# Patient Record
Sex: FEMALE | Race: WHITE | Employment: OTHER | URBAN - METROPOLITAN AREA
[De-identification: names, ages, dates, MRNs, and addresses within clinical notes are randomized per-mention and may not be internally consistent; named-entity substitution may affect disease eponyms.]

---

## 2021-04-26 ENCOUNTER — HOSPITAL ENCOUNTER (INPATIENT)
Age: 66
LOS: 7 days | Discharge: HOME | DRG: 065 | End: 2021-05-04
Attending: EMERGENCY MEDICINE | Admitting: FAMILY MEDICINE
Payer: MEDICARE

## 2021-04-26 ENCOUNTER — APPOINTMENT (OUTPATIENT)
Dept: CT IMAGING | Age: 66
DRG: 065 | End: 2021-04-26
Payer: MEDICARE

## 2021-04-26 ENCOUNTER — APPOINTMENT (OUTPATIENT)
Dept: GENERAL RADIOLOGY | Age: 66
DRG: 065 | End: 2021-04-26
Payer: MEDICARE

## 2021-04-26 DIAGNOSIS — I63.9 CEREBROVASCULAR ACCIDENT (CVA), UNSPECIFIED MECHANISM (HCC): ICD-10-CM

## 2021-04-26 DIAGNOSIS — G93.40 ENCEPHALOPATHY ACUTE: Primary | ICD-10-CM

## 2021-04-26 PROBLEM — R29.90 STROKE-LIKE SYMPTOMS: Status: ACTIVE | Noted: 2021-04-26

## 2021-04-26 LAB
ACETAMINOPHEN LEVEL: <5 MCG/ML (ref 10–30)
ALBUMIN SERPL-MCNC: 3.8 G/DL (ref 3.5–5.2)
ALP BLD-CCNC: 102 U/L (ref 35–104)
ALT SERPL-CCNC: 6 U/L (ref 0–32)
ANION GAP SERPL CALCULATED.3IONS-SCNC: 12 MMOL/L (ref 7–16)
APTT: 28.3 SEC (ref 24.5–35.1)
AST SERPL-CCNC: 9 U/L (ref 0–31)
BILIRUB SERPL-MCNC: 0.5 MG/DL (ref 0–1.2)
BUN BLDV-MCNC: 20 MG/DL (ref 6–23)
CALCIUM SERPL-MCNC: 9.3 MG/DL (ref 8.6–10.2)
CHLORIDE BLD-SCNC: 94 MMOL/L (ref 98–107)
CO2: 26 MMOL/L (ref 22–29)
CREAT SERPL-MCNC: 1.2 MG/DL (ref 0.5–1)
ETHANOL: <10 MG/DL (ref 0–0.08)
GFR AFRICAN AMERICAN: 55
GFR NON-AFRICAN AMERICAN: 45 ML/MIN/1.73
GLUCOSE BLD-MCNC: 199 MG/DL (ref 74–99)
INR BLD: 1.2
LACTIC ACID, SEPSIS: 1.5 MMOL/L (ref 0.5–1.9)
LIPASE: 28 U/L (ref 13–60)
METER GLUCOSE: 222 MG/DL (ref 74–99)
POTASSIUM REFLEX MAGNESIUM: 4.4 MMOL/L (ref 3.5–5)
PRO-BNP: 131 PG/ML (ref 0–125)
PROTHROMBIN TIME: 12.9 SEC (ref 9.3–12.4)
SALICYLATE, SERUM: <0.3 MG/DL (ref 0–30)
SODIUM BLD-SCNC: 132 MMOL/L (ref 132–146)
TOTAL PROTEIN: 7.6 G/DL (ref 6.4–8.3)
TRICYCLIC ANTIDEPRESSANTS SCREEN SERUM: NEGATIVE NG/ML
TROPONIN: <0.01 NG/ML (ref 0–0.03)

## 2021-04-26 PROCEDURE — 80307 DRUG TEST PRSMV CHEM ANLYZR: CPT

## 2021-04-26 PROCEDURE — 80143 DRUG ASSAY ACETAMINOPHEN: CPT

## 2021-04-26 PROCEDURE — 85730 THROMBOPLASTIN TIME PARTIAL: CPT

## 2021-04-26 PROCEDURE — 83690 ASSAY OF LIPASE: CPT

## 2021-04-26 PROCEDURE — 87635 SARS-COV-2 COVID-19 AMP PRB: CPT

## 2021-04-26 PROCEDURE — 83605 ASSAY OF LACTIC ACID: CPT

## 2021-04-26 PROCEDURE — 70450 CT HEAD/BRAIN W/O DYE: CPT

## 2021-04-26 PROCEDURE — 0042T CT BRAIN PERFUSION: CPT | Performed by: RADIOLOGY

## 2021-04-26 PROCEDURE — 70496 CT ANGIOGRAPHY HEAD: CPT

## 2021-04-26 PROCEDURE — 85025 COMPLETE CBC W/AUTO DIFF WBC: CPT

## 2021-04-26 PROCEDURE — 80179 DRUG ASSAY SALICYLATE: CPT

## 2021-04-26 PROCEDURE — 6360000004 HC RX CONTRAST MEDICATION: Performed by: RADIOLOGY

## 2021-04-26 PROCEDURE — 82962 GLUCOSE BLOOD TEST: CPT

## 2021-04-26 PROCEDURE — 85610 PROTHROMBIN TIME: CPT

## 2021-04-26 PROCEDURE — 80053 COMPREHEN METABOLIC PANEL: CPT

## 2021-04-26 PROCEDURE — 99448 NTRPROF PH1/NTRNET/EHR 21-30: CPT | Performed by: PSYCHIATRY & NEUROLOGY

## 2021-04-26 PROCEDURE — 0042T CT BRAIN PERFUSION: CPT

## 2021-04-26 PROCEDURE — 83880 ASSAY OF NATRIURETIC PEPTIDE: CPT

## 2021-04-26 PROCEDURE — 70496 CT ANGIOGRAPHY HEAD: CPT | Performed by: RADIOLOGY

## 2021-04-26 PROCEDURE — 84484 ASSAY OF TROPONIN QUANT: CPT

## 2021-04-26 PROCEDURE — 70498 CT ANGIOGRAPHY NECK: CPT

## 2021-04-26 PROCEDURE — 99284 EMERGENCY DEPT VISIT MOD MDM: CPT

## 2021-04-26 PROCEDURE — 70498 CT ANGIOGRAPHY NECK: CPT | Performed by: RADIOLOGY

## 2021-04-26 PROCEDURE — 82077 ASSAY SPEC XCP UR&BREATH IA: CPT

## 2021-04-26 PROCEDURE — 71045 X-RAY EXAM CHEST 1 VIEW: CPT

## 2021-04-26 RX ORDER — 0.9 % SODIUM CHLORIDE 0.9 %
1000 INTRAVENOUS SOLUTION INTRAVENOUS ONCE
Status: COMPLETED | OUTPATIENT
Start: 2021-04-27 | End: 2021-04-27

## 2021-04-26 RX ADMIN — IOPAMIDOL 120 ML: 755 INJECTION, SOLUTION INTRAVENOUS at 23:16

## 2021-04-27 PROBLEM — G93.40 ENCEPHALOPATHY ACUTE: Status: ACTIVE | Noted: 2021-04-27

## 2021-04-27 LAB
ABO/RH: NORMAL
AMPHETAMINE SCREEN, URINE: NOT DETECTED
ANISOCYTOSIS: ABNORMAL
ANTIBODY SCREEN: NORMAL
BARBITURATE SCREEN URINE: NOT DETECTED
BASOPHILS ABSOLUTE: 0 E9/L (ref 0–0.2)
BASOPHILS RELATIVE PERCENT: 0.5 % (ref 0–2)
BENZODIAZEPINE SCREEN, URINE: NOT DETECTED
BILIRUBIN URINE: NEGATIVE
BLOOD, URINE: NEGATIVE
CANNABINOID SCREEN URINE: POSITIVE
CHOLESTEROL, TOTAL: 156 MG/DL (ref 0–199)
CLARITY: CLEAR
COCAINE METABOLITE SCREEN URINE: NOT DETECTED
COLOR: YELLOW
EKG ATRIAL RATE: 102 BPM
EKG P AXIS: 56 DEGREES
EKG P-R INTERVAL: 180 MS
EKG Q-T INTERVAL: 364 MS
EKG QRS DURATION: 88 MS
EKG QTC CALCULATION (BAZETT): 474 MS
EKG R AXIS: -22 DEGREES
EKG T AXIS: 26 DEGREES
EKG VENTRICULAR RATE: 102 BPM
EOSINOPHILS ABSOLUTE: 0 E9/L (ref 0.05–0.5)
EOSINOPHILS RELATIVE PERCENT: 1 % (ref 0–6)
FENTANYL SCREEN, URINE: NOT DETECTED
FOLATE: 2.6 NG/ML (ref 4.8–24.2)
GLUCOSE URINE: NEGATIVE MG/DL
HBA1C MFR BLD: 5.7 % (ref 4–5.6)
HCT VFR BLD CALC: 27.7 % (ref 34–48)
HCT VFR BLD CALC: 33 % (ref 34–48)
HDLC SERPL-MCNC: 30 MG/DL
HEMOGLOBIN: 7.9 G/DL (ref 11.5–15.5)
HEMOGLOBIN: 9.7 G/DL (ref 11.5–15.5)
HYPOCHROMIA: ABNORMAL
IRON SATURATION: 10 % (ref 15–50)
IRON: 25 MCG/DL (ref 37–145)
KETONES, URINE: NEGATIVE MG/DL
LACTIC ACID, SEPSIS: 2 MMOL/L (ref 0.5–1.9)
LACTIC ACID: 0.9 MMOL/L (ref 0.5–2.2)
LDL CHOLESTEROL CALCULATED: 101 MG/DL (ref 0–99)
LEUKOCYTE ESTERASE, URINE: NEGATIVE
LYMPHOCYTES ABSOLUTE: 0.49 E9/L (ref 1.5–4)
LYMPHOCYTES RELATIVE PERCENT: 5.2 % (ref 20–42)
Lab: ABNORMAL
MCH RBC QN AUTO: 20.6 PG (ref 26–35)
MCH RBC QN AUTO: 20.8 PG (ref 26–35)
MCHC RBC AUTO-ENTMCNC: 28.5 % (ref 32–34.5)
MCHC RBC AUTO-ENTMCNC: 29.4 % (ref 32–34.5)
MCV RBC AUTO: 70.8 FL (ref 80–99.9)
MCV RBC AUTO: 72.1 FL (ref 80–99.9)
METHADONE SCREEN, URINE: NOT DETECTED
MONOCYTES ABSOLUTE: 0.19 E9/L (ref 0.1–0.95)
MONOCYTES RELATIVE PERCENT: 1.7 % (ref 2–12)
NEUTROPHILS ABSOLUTE: 9.02 E9/L (ref 1.8–7.3)
NEUTROPHILS RELATIVE PERCENT: 93 % (ref 43–80)
NITRITE, URINE: NEGATIVE
NUCLEATED RED BLOOD CELLS: 0.9 /100 WBC
OPIATE SCREEN URINE: NOT DETECTED
OVALOCYTES: ABNORMAL
OXYCODONE URINE: NOT DETECTED
PDW BLD-RTO: 20.2 FL (ref 11.5–15)
PDW BLD-RTO: 20.2 FL (ref 11.5–15)
PH UA: 6 (ref 5–9)
PHENCYCLIDINE SCREEN URINE: NOT DETECTED
PLATELET # BLD: 203 E9/L (ref 130–450)
PLATELET # BLD: 301 E9/L (ref 130–450)
PMV BLD AUTO: 8.5 FL (ref 7–12)
PMV BLD AUTO: 8.7 FL (ref 7–12)
POIKILOCYTES: ABNORMAL
POLYCHROMASIA: ABNORMAL
PROTEIN UA: NEGATIVE MG/DL
RBC # BLD: 3.84 E12/L (ref 3.5–5.5)
RBC # BLD: 4.66 E12/L (ref 3.5–5.5)
SARS-COV-2, NAAT: NOT DETECTED
SPECIFIC GRAVITY UA: <=1.005 (ref 1–1.03)
TOTAL IRON BINDING CAPACITY: 243 MCG/DL (ref 250–450)
TRIGL SERPL-MCNC: 125 MG/DL (ref 0–149)
TROPONIN: <0.01 NG/ML (ref 0–0.03)
TROPONIN: <0.01 NG/ML (ref 0–0.03)
TSH SERPL DL<=0.05 MIU/L-ACNC: 0.35 UIU/ML (ref 0.27–4.2)
UROBILINOGEN, URINE: 4 E.U./DL
VITAMIN B-12: 362 PG/ML (ref 211–946)
VLDLC SERPL CALC-MCNC: 25 MG/DL
WBC # BLD: 7.2 E9/L (ref 4.5–11.5)
WBC # BLD: 9.7 E9/L (ref 4.5–11.5)

## 2021-04-27 PROCEDURE — 81003 URINALYSIS AUTO W/O SCOPE: CPT

## 2021-04-27 PROCEDURE — 86850 RBC ANTIBODY SCREEN: CPT

## 2021-04-27 PROCEDURE — 82607 VITAMIN B-12: CPT

## 2021-04-27 PROCEDURE — 2580000003 HC RX 258: Performed by: FAMILY MEDICINE

## 2021-04-27 PROCEDURE — 99221 1ST HOSP IP/OBS SF/LOW 40: CPT | Performed by: PSYCHIATRY & NEUROLOGY

## 2021-04-27 PROCEDURE — 36415 COLL VENOUS BLD VENIPUNCTURE: CPT

## 2021-04-27 PROCEDURE — 84443 ASSAY THYROID STIM HORMONE: CPT

## 2021-04-27 PROCEDURE — 83550 IRON BINDING TEST: CPT

## 2021-04-27 PROCEDURE — 83540 ASSAY OF IRON: CPT

## 2021-04-27 PROCEDURE — 6370000000 HC RX 637 (ALT 250 FOR IP): Performed by: STUDENT IN AN ORGANIZED HEALTH CARE EDUCATION/TRAINING PROGRAM

## 2021-04-27 PROCEDURE — 93005 ELECTROCARDIOGRAM TRACING: CPT | Performed by: STUDENT IN AN ORGANIZED HEALTH CARE EDUCATION/TRAINING PROGRAM

## 2021-04-27 PROCEDURE — 2580000003 HC RX 258: Performed by: STUDENT IN AN ORGANIZED HEALTH CARE EDUCATION/TRAINING PROGRAM

## 2021-04-27 PROCEDURE — 86901 BLOOD TYPING SEROLOGIC RH(D): CPT

## 2021-04-27 PROCEDURE — 87040 BLOOD CULTURE FOR BACTERIA: CPT

## 2021-04-27 PROCEDURE — 83605 ASSAY OF LACTIC ACID: CPT

## 2021-04-27 PROCEDURE — 83036 HEMOGLOBIN GLYCOSYLATED A1C: CPT

## 2021-04-27 PROCEDURE — 2580000003 HC RX 258: Performed by: EMERGENCY MEDICINE

## 2021-04-27 PROCEDURE — 2060000000 HC ICU INTERMEDIATE R&B

## 2021-04-27 PROCEDURE — 84484 ASSAY OF TROPONIN QUANT: CPT

## 2021-04-27 PROCEDURE — 82746 ASSAY OF FOLIC ACID SERUM: CPT

## 2021-04-27 PROCEDURE — 86900 BLOOD TYPING SEROLOGIC ABO: CPT

## 2021-04-27 PROCEDURE — 80061 LIPID PANEL: CPT

## 2021-04-27 PROCEDURE — 80307 DRUG TEST PRSMV CHEM ANLYZR: CPT

## 2021-04-27 PROCEDURE — 85027 COMPLETE CBC AUTOMATED: CPT

## 2021-04-27 PROCEDURE — 2700000000 HC OXYGEN THERAPY PER DAY

## 2021-04-27 RX ORDER — SODIUM CHLORIDE 9 MG/ML
INJECTION, SOLUTION INTRAVENOUS CONTINUOUS
Status: DISCONTINUED | OUTPATIENT
Start: 2021-04-27 | End: 2021-04-29

## 2021-04-27 RX ORDER — FENTANYL CITRATE 50 UG/ML
INJECTION, SOLUTION INTRAMUSCULAR; INTRAVENOUS
Status: DISPENSED
Start: 2021-04-27 | End: 2021-04-28

## 2021-04-27 RX ORDER — SODIUM CHLORIDE 9 MG/ML
1000 INJECTION, SOLUTION INTRAVENOUS CONTINUOUS
Status: DISCONTINUED | OUTPATIENT
Start: 2021-04-27 | End: 2021-04-27 | Stop reason: SDUPTHER

## 2021-04-27 RX ORDER — ASPIRIN 81 MG/1
81 TABLET ORAL DAILY
Status: DISCONTINUED | OUTPATIENT
Start: 2021-04-27 | End: 2021-05-01

## 2021-04-27 RX ORDER — 0.9 % SODIUM CHLORIDE 0.9 %
1000 INTRAVENOUS SOLUTION INTRAVENOUS ONCE
Status: COMPLETED | OUTPATIENT
Start: 2021-04-27 | End: 2021-04-27

## 2021-04-27 RX ORDER — ASPIRIN 300 MG/1
300 SUPPOSITORY RECTAL DAILY
Status: DISCONTINUED | OUTPATIENT
Start: 2021-04-27 | End: 2021-05-01

## 2021-04-27 RX ORDER — ATORVASTATIN CALCIUM 40 MG/1
40 TABLET, FILM COATED ORAL NIGHTLY
Status: DISCONTINUED | OUTPATIENT
Start: 2021-04-27 | End: 2021-05-05 | Stop reason: HOSPADM

## 2021-04-27 RX ORDER — PROMETHAZINE HYDROCHLORIDE 25 MG/1
12.5 TABLET ORAL EVERY 6 HOURS PRN
Status: DISCONTINUED | OUTPATIENT
Start: 2021-04-27 | End: 2021-05-05 | Stop reason: HOSPADM

## 2021-04-27 RX ORDER — POLYETHYLENE GLYCOL 3350 17 G/17G
17 POWDER, FOR SOLUTION ORAL DAILY PRN
Status: DISCONTINUED | OUTPATIENT
Start: 2021-04-27 | End: 2021-05-05 | Stop reason: HOSPADM

## 2021-04-27 RX ORDER — ASPIRIN 81 MG/1
81 TABLET, CHEWABLE ORAL DAILY
Status: ON HOLD | COMMUNITY
End: 2021-05-03 | Stop reason: HOSPADM

## 2021-04-27 RX ORDER — SODIUM CHLORIDE 9 MG/ML
25 INJECTION, SOLUTION INTRAVENOUS PRN
Status: DISCONTINUED | OUTPATIENT
Start: 2021-04-27 | End: 2021-05-05 | Stop reason: HOSPADM

## 2021-04-27 RX ORDER — SODIUM CHLORIDE 0.9 % (FLUSH) 0.9 %
5-40 SYRINGE (ML) INJECTION PRN
Status: DISCONTINUED | OUTPATIENT
Start: 2021-04-27 | End: 2021-05-05 | Stop reason: HOSPADM

## 2021-04-27 RX ORDER — ACETAMINOPHEN 650 MG/1
SUPPOSITORY RECTAL
Status: DISCONTINUED
Start: 2021-04-27 | End: 2021-04-27 | Stop reason: WASHOUT

## 2021-04-27 RX ORDER — ONDANSETRON 2 MG/ML
4 INJECTION INTRAMUSCULAR; INTRAVENOUS EVERY 6 HOURS PRN
Status: DISCONTINUED | OUTPATIENT
Start: 2021-04-27 | End: 2021-05-05 | Stop reason: HOSPADM

## 2021-04-27 RX ORDER — ASPIRIN 300 MG/1
300 SUPPOSITORY RECTAL ONCE
Status: COMPLETED | OUTPATIENT
Start: 2021-04-27 | End: 2021-04-27

## 2021-04-27 RX ORDER — SODIUM CHLORIDE 0.9 % (FLUSH) 0.9 %
5-40 SYRINGE (ML) INJECTION EVERY 12 HOURS SCHEDULED
Status: DISCONTINUED | OUTPATIENT
Start: 2021-04-27 | End: 2021-05-05 | Stop reason: HOSPADM

## 2021-04-27 RX ADMIN — SODIUM CHLORIDE 1000 ML: 9 INJECTION, SOLUTION INTRAVENOUS at 04:14

## 2021-04-27 RX ADMIN — Medication 10 ML: at 10:50

## 2021-04-27 RX ADMIN — SODIUM CHLORIDE: 9 INJECTION, SOLUTION INTRAVENOUS at 06:05

## 2021-04-27 RX ADMIN — ASPIRIN 300 MG: 300 SUPPOSITORY RECTAL at 03:51

## 2021-04-27 RX ADMIN — SODIUM CHLORIDE 1000 ML: 9 INJECTION, SOLUTION INTRAVENOUS at 00:30

## 2021-04-27 NOTE — ED NOTES
Time Neurologist page: [phone] telestroke      Time Stroke Alert called: 0579  :    Time Neurologist called back:     X-Ray/CT notified: Yes     Sofia Litten  04/26/21 5917

## 2021-04-27 NOTE — PROGRESS NOTES
OT SESSION ATTEMPT     Date:2021  Patient Name: Yesy Moffett  MRN: [id number]  : 1955  Room: [address] [place] [address]     Attempted OT session this date:    [x] unavailable due to other medical staff currently with pt   [] on hold, await MRI/ neurosurgical recommendations. [] on hold per nursing staff secondary to lab / radiology results    [] declined Occupational Therapy  this date due to ___. Benefits of participation in therapy reviewed with pt.    [] off unit   [] Other:     Will reattempt OT eval at a later time.     Kelsy [place], [place] [address]

## 2021-04-27 NOTE — ED PROVIDER NOTES
[address]      Pt Name: Tanisha Rm  MRN: [id number]  [place] 1955  Date of evaluation: 4/26/2021      CHIEF COMPLAINT       Chief Complaint   Patient presents with    Cerebrovascular Accident     LKW 10:00pm. per family patient went unresponsive while sitting on the couch. hx of stroke in 2017.  during triage . Patient moves all extremities to painful stimuli        HPI  Tanisha Rm is a 73 y.o. female with history of previous CVA who presents to the emergency department with altered mental status. Apparently at 10 PM she is at the family sitting on couch and recently became unresponsive. They called EMS who brought her here. Blood sugar 222 per EMS. Not hypoxic with stable vitals per EMS. History limited secondary to patient nonverbal        Review of Systems   Unable to perform ROS: Mental status change        Physical Exam  Vitals signs and nursing note reviewed. Constitutional:       Appearance: She is well-developed. Comments: Awake. Opens eyes spontaneously. Moves all extremities to pain. HENT:      Head: Normocephalic and atraumatic. Right Ear: External ear normal.      Left Ear: External ear normal.      Mouth/Throat:      Mouth: Mucous membranes are moist.   Eyes:      General: No scleral icterus. Pupils: Pupils are equal, round, and reactive to light. Comments: EOMI   Neck:      Musculoskeletal: Normal range of motion and neck supple. Cardiovascular:      Rate and Rhythm: Normal rate and regular rhythm. Heart sounds: No murmur. Comments: 2+ radial and dorsal pedis pulses bilaterally  Pulmonary:      Effort: Pulmonary effort is normal. No respiratory distress. Breath sounds: Normal breath sounds. No wheezing. Abdominal:      General: There is no distension. Palpations: Abdomen is soft. Tenderness: There is no guarding or rebound. Musculoskeletal:         General: No deformity. Right lower leg: No edema. Left lower leg: No edema. Skin:     General: Skin is warm and dry. Capillary Refill: Capillary refill takes less than 2 seconds. Neurological:      Mental Status: She is alert. Motor: No abnormal muscle tone. Comments: Patient nonverbal.  Opens eyes spontaneously. Moves all extremities secondary to pain. EOMI. Pupils equal round reactive. Not following commands. Psychiatric:      Comments: Nonverbal.  Not following commands. Exam limited. Procedures     MDM   This is a 43-year-old female with a history of previous CVA presents with altered mental status acutely at 10 PM.  In the emergency department patient awake, opens eyes spontaneously. Moves all extremities secondary to pain. Maintaining airway nonhypoxic. Stroke alert called given sudden onset of symptoms. Discussed with Dr. Starr Max, neurology as well as interventional neurology, Dr. Demetrio Estrada. CTA head and neck, brain perfusion study showed no acute intracranial abnormality. Did show previous old infarct. Patient's family provides additional history after arrival that she likely had a marijuana brownie earlier today. Metabolic panel shows normal electrolytes, creatinine 1.2. Administered IV fluids in the ED as well as aspirin. EKG showed no ischemic changes troponin was negative less likely acute cardiac etiology of her symptoms. Alcohol level negative. Patient remained hemodynamic stable, no respiratory distress on 2 L nasal cannula. Continue to be nonverbal but moving all extremities and securing airway. Discussed the case with Dr. Darell Moore, no suspicion for further evaluation. ED Course as of Apr 27 0107   Tue Apr 27, 2021   0055 Patient remains awake, occasional moving all extremities. Maintaining airway. Occasionally licks lips and looks around room. Continues to not answer questions.     [LM]      ED Course User Index  [LM] Rebecca Baker DO         ED Course as of Apr 27 0107   Tue Apr 27, 2021   0055 Patient remains awake, occasional moving all extremities. Maintaining airway. Occasionally licks lips and looks around room. Continues to not answer questions. [LM]      ED Course User Index  [LM] Rebecca Baker DO       --------------------------------------------- PAST HISTORY ---------------------------------------------  Past Medical History:  has a past medical history of Acute gastroenteritis, Chronic back pain, Headache(784.0), Hyperlipidemia, Obesity, Osteoarthritis, and Tobacco abuse. Past Surgical History:  has a past surgical history that includes Tubal ligation (1604). Social History:  reports that she has been smoking cigarettes. She has a 30.40 pack-year smoking history. She has never used smokeless tobacco. She reports that she does not drink alcohol or use drugs. Family History: family history includes Cancer in her brother and sister; Diabetes in her brother, sister, and sister; Heart Disease in her brother; Heart Disease (age of onset: 28) in her sister; High Blood Pressure in her brother and sister; High Cholesterol in her brother. The patients home medications have been reviewed. Allergies: Patient has no known allergies.     -------------------------------------------------- RESULTS -------------------------------------------------    LABS:  Results for orders placed or performed during the hospital encounter of 04/26/21   COVID-19, Rapid    Specimen: Nasopharyngeal Swab   Result Value Ref Range    SARS-CoV-2, NAAT Not Detected Not Detected   CBC Auto Differential   Result Value Ref Range    WBC 9.7 4.5 - 11.5 E9/L    RBC 4.66 3.50 - 5.50 E12/L    Hemoglobin 9.7 (L) 11.5 - 15.5 g/dL    Hematocrit 33.0 (L) 34.0 - 48.0 %    MCV 70.8 (L) 80.0 - 99.9 fL    MCH 20.8 (L) 26.0 - 35.0 pg    MCHC 29.4 (L) 32.0 - 34.5 %    RDW 20.2 (H) 11.5 - 15.0 fL    Platelets 095 457 - 268 E9/L    MPV 8.5 7.0 - 12.0 fL    Neutrophils % 93.0 (H) 43.0 - 80.0 %    Lymphocytes % 5.2 (L) 20.0 - 42.0 %    Monocytes % 1.7 (L) 2.0 - 12.0 %    Eosinophils % 1.0 0.0 - 6.0 %    Basophils % 0.5 0.0 - 2.0 %    Neutrophils Absolute 9.02 (H) 1.80 - 7.30 E9/L    Lymphocytes Absolute 0.49 (L) 1.50 - 4.00 E9/L    Monocytes Absolute 0.19 0.10 - 0.95 E9/L    Eosinophils Absolute 0.00 (L) 0.05 - 0.50 E9/L    Basophils Absolute 0.00 0.00 - 0.20 E9/L    nRBC 0.9 /100 WBC    Anisocytosis 2+     Polychromasia 1+     Hypochromia 2+     Poikilocytes 1+     Ovalocytes 1+    Comprehensive Metabolic Panel w/ Reflex to MG   Result Value Ref Range    Sodium 132 132 - 146 mmol/L    Potassium reflex Magnesium 4.4 3.5 - 5.0 mmol/L    Chloride 94 (L) 98 - 107 mmol/L    CO2 26 22 - 29 mmol/L    Anion Gap 12 7 - 16 mmol/L    Glucose 199 (H) 74 - 99 mg/dL    BUN 20 6 - 23 mg/dL    CREATININE 1.2 (H) 0.5 - 1.0 mg/dL    GFR Non-African American 45 >=60 mL/min/1.73    GFR African American 55     Calcium 9.3 8.6 - 10.2 mg/dL    Total Protein 7.6 6.4 - 8.3 g/dL    Albumin 3.8 3.5 - 5.2 g/dL    Total Bilirubin 0.5 0.0 - 1.2 mg/dL    Alkaline Phosphatase 102 35 - 104 U/L    ALT 6 0 - 32 U/L    AST 9 0 - 31 U/L   Lipase   Result Value Ref Range    Lipase 28 13 - 60 U/L   Troponin   Result Value Ref Range    Troponin <0.01 0.00 - 0.03 ng/mL   Brain Natriuretic Peptide   Result Value Ref Range    Pro- (H) 0 - 125 pg/mL   Protime-INR   Result Value Ref Range    Protime 12.9 (H) 9.3 - 12.4 sec    INR 1.2    APTT   Result Value Ref Range    aPTT 28.3 24.5 - 35.1 sec   Lactate, Sepsis   Result Value Ref Range    Lactic Acid, Sepsis 1.5 0.5 - 1.9 mmol/L   Lactate, Sepsis   Result Value Ref Range    Lactic Acid, Sepsis 2.0 (H) 0.5 - 1.9 mmol/L   Serum Drug Screen   Result Value Ref Range    Ethanol Lvl <10 mg/dL    Acetaminophen Level <5.0 (L) 10.0 - 51.4 mcg/mL    Salicylate, Serum <3.4 0.0 - 30.0 mg/dL    TCA Scrn NEGATIVE Cutoff:300 ng/mL   POCT Glucose Time Roomed:  4/26/2021 10:50 PM  ED Bed Assignment:  12/12    The nursing notes within the ED encounter and vital signs as below have been reviewed. Patient Vitals for the past 24 hrs:   BP Temp Pulse Resp SpO2   04/27/21 0105 (!) 93/42 -- 97 25 97 %   04/27/21 0104 (!) 93/42 96.9 °F (36.1 °C) -- 26 97 %   04/27/21 0101 (!) 89/42 -- 98 28 --   04/27/21 0047 (!) 76/35 -- 107 (!) 31 --   04/27/21 0031 (!) 98/46 -- 107 (!) 31 --   04/27/21 0002 (!) 94/44 -- 102 25 100 %   04/26/21 2331 (!) 122/58 -- 105 21 94 %       Oxygen Saturation Interpretation: Normal    ------------------------------------------ PROGRESS NOTES ------------------------------------------    Counseling:  I have spoken with the son and discussed todays results, in addition to providing specific details for the plan of care and counseling regarding the diagnosis and prognosis. Their questions are answered at this time and they are agreeable with the plan of admission.    --------------------------------- ADDITIONAL PROVIDER NOTES ---------------------------------  Consultations:   Spoke with Dr. Candace Polk. Discussed case. They will admit the patient. This patient's ED course included: a personal history and physicial examination, re-evaluation prior to disposition, multiple bedside re-evaluations, IV medications, cardiac monitoring and continuous pulse oximetry    This patient has remained hemodynamically stable during their ED course. Diagnosis:  1. Encephalopathy acute    2. Cerebrovascular accident (CVA), unspecified mechanism ([address]        Disposition:  Patient's disposition: Admit to telemetry  Patient's condition is fair.          Mindy Cifuentes DO  Resident  04/27/21 6195

## 2021-04-27 NOTE — ED NOTES
Unable to send patient for MRI at this time, is unable to give me answers to MRI screening form, tried to call son, no answer. No reasons based on chart she should be unable to go. MRI updated.       Argentina Gomez RN  04/27/21 5644

## 2021-04-27 NOTE — H&P
Hospital Medicine History & Physical      PCP: Demetrio Tsang MD (Inactive)    Date of Admission: 4/26/2021    Date of Service: Pt seen/examined on 4/27/2021  and Admitted to Inpatient with expected LOS greater than two midnights due to medical therapy. Chief Complaint:  AMS       History Of Present Illness:    73 y.o. female with past medical history of CVA  HLD   . She present to the ED due to  altered mental status . Patient was with family at 10 pm sitting on the couch and suddenly became unresponsive . She started to have slurred speech . In the ED blood sugar was 222 . Patient was not hypoxic in ED Patient responds  to pain but is able to open her eyes but does not speak . History is limited because patient is nonverbal. ED course revealed trop <0.01  creatinine 1.2 lactic acid 1.5 CT perfusion revealed no acute infarct . CTA Head /neck revealed no signficant stenosis     Past Medical History:          Diagnosis Date    Acute gastroenteritis 03/2010    Chronic back pain     Headache(784.0)     Hyperlipidemia 03/2010    Obesity     Osteoarthritis     Tobacco abuse        Past Surgical History:          Procedure Laterality Date    TUBAL LIGATION  1983       Medications Prior to Admission:      Prior to Admission medications    Not on File       Allergies:  Patient has no known allergies. Social History:      The patient currently lives with family     TOBACCO:   reports that she has been smoking cigarettes. She has a 30.40 pack-year smoking history. She has never used smokeless tobacco.  ETOH:   reports no history of alcohol use. Family History:       Reviewed in detail and negative for DM, CAD, Cancer, CVA.  Positive as follows:        Problem Relation Age of Onset    Diabetes Sister     High Blood Pressure Sister     Cancer Sister         Thryoid    Diabetes Sister     Heart Disease Sister 28        Heart Failure    Cancer Brother     Diabetes Brother     Heart Viz[employer]ai algorithm       CT PERFUSION   There is evidence for core infarct in the right frontal lobe likely   old, no acute infarct or ischemia identified       This study was analyzed by the Viz. ai algorithm.             CT HEAD    No evidence for acute intracranial hemorrhage, territorial infarction or   intracranial mass lesion. Delmy Tadeo considering the extensive amount of white   matter changes and extensive prior areas of infarction, brain MRI is more   sensitive for detection of subtle foci of acute infarction.       2.  Chronic encephalomalacia involving right medial occipital lobe and   anterior aspect of the right superior frontal gyrus and chronic   encephalomalacia of bilateral corona radiata and bilateral thalamus and   bilateral basal ganglia.       3.  Moderate chronic microangiopathic ischemic disease. Mild generalized   volume loss. Urinalysis:    No results found for: Darliss Mcburney, BACTERIA, RBCUA, BLOODU, SPECGRAV, GLUCOSEU      ASSESSMENT:    Active Hospital Problems    Diagnosis Date Noted    Encephalopathy acute [G93.40] 04/27/2021    Stroke-like symptoms [R29.90] 04/26/2021       PLAN:    Acute Encephalopathy :   CVA vs metabolic encephalopathy Patient become unresponsive with family and was aphasic. Per family patient ate a THC  brownie . CT head reveals white matter changes and prior areas of infarction and MRI   recommended . CTA Head /Neck revealed no significant stenosis   COVID neg.  Admit inpatient vital telemetry NPO swallow evaluation 2D echo  ASA Lipitor  MRI head Neurology consult PT/OT blood cultures, UA UDS     Acute kidney injury : Scr is 1.2 baseline is 0.9 hydrate with intravenous fluids  Likely prerenal    Lactic Acidosis :  1.5 on admission >>>2.0 hydrate with IVF and repeat     Hyperglycemia : glucose was 199 A1c pending     Microcytic Anemia :  HGB is 9.7 no baseline in EMR to compare  iron studies sand occult                 DVT Prophylaxis: Lovenox   Diet: NPO Code Status: full code      Maggy Skinner MD

## 2021-04-27 NOTE — CONSULTS
[facility]  Neurology Consult    Date:  4/27/2021  Patient Name:  Alf Arnett  YOB: 1955  MRN: [id number]     PCP:  Negro Quiroz MD (Inactive)   Referring:  No ref. provider found      Chief Complaint: episode of altered mental status    History obtained from: chart    Assessment  Alf Arnett is a 73 y.o. female with altered mental status, though, at present she appears to awaken very easily with noxious stimulation. Her exam is non-lateralizing other than a lack of speech production. Unfortunately, no family was reachable at present to give further history. Concern would be for stroke vs post-ictal state at this time. Plan  · MRI brain w/o contrast  · EEG        History of Present Illness:  Alf Arnett is a 73 y.o. female presenting for evaluation of altered mental status. As per the chart: \"Patient was with family at 10 pm sitting on the couch and suddenly became unresponsive . She started to have slurred speech. \" The patient when seen in the ED did not provide any history or follow any commands.        Review of Systems:  Unable to obtain    Medical History:   Past Medical History:   Diagnosis Date    Acute gastroenteritis 03/2010    Chronic back pain     Headache(784.0)     Hyperlipidemia 03/2010    Obesity     Osteoarthritis     Tobacco abuse         Surgical History:   Past Surgical History:   Procedure Laterality Date    TUBAL LIGATION  1983        Family History:   Family History   Problem Relation Age of Onset    Diabetes Sister     High Blood Pressure Sister     Cancer Sister         Thryoid    Diabetes Sister     Heart Disease Sister 28        Heart Failure    Cancer Brother     Diabetes Brother     Heart Disease Brother     High Blood Pressure Brother     High Cholesterol Brother          Social History:  Social History     Tobacco Use    Smoking status: Current Every Day Smoker     Packs/day: 0.80     Years: 38.00     Pack years: no respiratory distress noted  Extremities: No edema or cyanosis noted    Mental Status  Will open eyes to noxious stimulation and will attend to examiner  Does not follow commands  Will purposefully withdraw or push away noxious stimulation    Cranial Nerves  + bilateral response to visual threat (bilateral peripheral fields)  Pupils 4 mm, do react to light. Spontaneous eye movements: Yes  Oculocephalic reflex present bilaterally  Face appears symmetric    Motor  + withdrawal in response to nail bed pressure in bilateral upper and lower extremities    Sensation +grimace/withdrawal in response to deep nail bed pressure in bilateral upper or lower extremities    Reflexes:       Right Left   Biceps 2 2   Brachioradialis 2 2   Quadriceps 2 2   Achilles 2 2   Ankle clonus 4 beats 4 beats   Babinski absent absent         Labs  Results for Ortega Bowen (MRN [id number]) as of 4/27/2021 17:41   Ref.  Range 4/27/2021 07:37   Lactic Acid Latest Ref Range: 0.5 - 2.2 mmol/L 0.9   Cholesterol, Total Latest Ref Range: 0 - 199 mg/dL 156   HDL Cholesterol Latest Ref Range: >40 mg/dL 30   LDL Calculated Latest Ref Range: 0 - 99 mg/dL 101 (H)   Triglycerides Latest Ref Range: 0 - 149 mg/dL 125   VLDL Cholesterol Calculated Latest Units: mg/dL 25   Hemoglobin A1C Latest Ref Range: 4.0 - 5.6 % 5.7 (H)   TSH Latest Ref Range: 0.270 - 4.200 uIU/mL 0.354   WBC Latest Ref Range: 4.5 - 11.5 E9/L 7.2   RBC Latest Ref Range: 3.50 - 5.50 E12/L 3.84   Hemoglobin Quant Latest Ref Range: 11.5 - 15.5 g/dL 7.9 (L)   Hematocrit Latest Ref Range: 34.0 - 48.0 % 27.7 (L)   MCV Latest Ref Range: 80.0 - 99.9 fL 72.1 (L)   MCH Latest Ref Range: 26.0 - 35.0 pg 20.6 (L)   MCHC Latest Ref Range: 32.0 - 34.5 % 28.5 (L)   MPV Latest Ref Range: 7.0 - 12.0 fL 8.7   RDW Latest Ref Range: 11.5 - 15.0 fL 20.2 (H)   Platelet Count Latest Ref Range: 130 - 450 E9/L 203   Iron Latest Ref Range: 37 - 145 mcg/dL 25 (L)   Iron Saturation Latest Ref Range: 15 - 50 % 10 (L)   TIBC Latest Ref Range: 250 - 450 mcg/dL 243 (L)   Folate Latest Ref Range: 4.8 - 24.2 ng/mL 2.6 (L)   Vitamin B-12 Latest Ref Range: 211 - 946 pg/mL 362   CULTURE, BLOOD 1 Unknown Rpt       Imaging  CTA head/neck and CT brain perfusion  Impression       There is evidence for core infarct in the right frontal lobe likely   old, no acute infarct or ischemia identified       This study was analyzed by the Viz[employer] ai algorithm. Impression   1.  Mild atherosclerotic disease . No large vessel occlusion of the   2.  Estimated stenosis of the proximal right and left internal carotid   artery by NASCET criteria is not hemodynamically significant         Electronically signed by: Quique Corbett DO, 4/27/2021 2:17 PM

## 2021-04-28 ENCOUNTER — APPOINTMENT (OUTPATIENT)
Dept: NEUROLOGY | Age: 66
DRG: 065 | End: 2021-04-28
Payer: MEDICARE

## 2021-04-28 ENCOUNTER — APPOINTMENT (OUTPATIENT)
Dept: MRI IMAGING | Age: 66
DRG: 065 | End: 2021-04-28
Payer: MEDICARE

## 2021-04-28 LAB
ALBUMIN SERPL-MCNC: 3.2 G/DL (ref 3.5–5.2)
ALP BLD-CCNC: 87 U/L (ref 35–104)
ALT SERPL-CCNC: 5 U/L (ref 0–32)
ANION GAP SERPL CALCULATED.3IONS-SCNC: 10 MMOL/L (ref 7–16)
ANION GAP SERPL CALCULATED.3IONS-SCNC: 10 MMOL/L (ref 7–16)
AST SERPL-CCNC: 8 U/L (ref 0–31)
BASOPHILS ABSOLUTE: 0.04 E9/L (ref 0–0.2)
BASOPHILS RELATIVE PERCENT: 0.6 % (ref 0–2)
BILIRUB SERPL-MCNC: 0.3 MG/DL (ref 0–1.2)
BUN BLDV-MCNC: 10 MG/DL (ref 6–23)
BUN BLDV-MCNC: 9 MG/DL (ref 6–23)
CALCIUM SERPL-MCNC: 8.5 MG/DL (ref 8.6–10.2)
CALCIUM SERPL-MCNC: 8.6 MG/DL (ref 8.6–10.2)
CHLORIDE BLD-SCNC: 101 MMOL/L (ref 98–107)
CHLORIDE BLD-SCNC: 102 MMOL/L (ref 98–107)
CO2: 23 MMOL/L (ref 22–29)
CO2: 23 MMOL/L (ref 22–29)
CREAT SERPL-MCNC: 0.7 MG/DL (ref 0.5–1)
CREAT SERPL-MCNC: 0.8 MG/DL (ref 0.5–1)
EOSINOPHILS ABSOLUTE: 0.16 E9/L (ref 0.05–0.5)
EOSINOPHILS RELATIVE PERCENT: 2.5 % (ref 0–6)
GFR AFRICAN AMERICAN: >60
GFR AFRICAN AMERICAN: >60
GFR NON-AFRICAN AMERICAN: >60 ML/MIN/1.73
GFR NON-AFRICAN AMERICAN: >60 ML/MIN/1.73
GLUCOSE BLD-MCNC: 152 MG/DL (ref 74–99)
GLUCOSE BLD-MCNC: 93 MG/DL (ref 74–99)
HCT VFR BLD CALC: 28.6 % (ref 34–48)
HEMOGLOBIN: 8.3 G/DL (ref 11.5–15.5)
IMMATURE GRANULOCYTES #: 0.04 E9/L
IMMATURE GRANULOCYTES %: 0.6 % (ref 0–5)
LYMPHOCYTES ABSOLUTE: 0.71 E9/L (ref 1.5–4)
LYMPHOCYTES RELATIVE PERCENT: 11.3 % (ref 20–42)
MCH RBC QN AUTO: 20.5 PG (ref 26–35)
MCHC RBC AUTO-ENTMCNC: 29 % (ref 32–34.5)
MCV RBC AUTO: 70.6 FL (ref 80–99.9)
MONOCYTES ABSOLUTE: 0.39 E9/L (ref 0.1–0.95)
MONOCYTES RELATIVE PERCENT: 6.2 % (ref 2–12)
NEUTROPHILS ABSOLUTE: 4.94 E9/L (ref 1.8–7.3)
NEUTROPHILS RELATIVE PERCENT: 78.8 % (ref 43–80)
PDW BLD-RTO: 19.9 FL (ref 11.5–15)
PLATELET # BLD: 245 E9/L (ref 130–450)
PMV BLD AUTO: 8.8 FL (ref 7–12)
POTASSIUM REFLEX MAGNESIUM: 3.6 MMOL/L (ref 3.5–5)
POTASSIUM REFLEX MAGNESIUM: 3.7 MMOL/L (ref 3.5–5)
RBC # BLD: 4.05 E12/L (ref 3.5–5.5)
SODIUM BLD-SCNC: 134 MMOL/L (ref 132–146)
SODIUM BLD-SCNC: 135 MMOL/L (ref 132–146)
TOTAL PROTEIN: 6.6 G/DL (ref 6.4–8.3)
TROPONIN: <0.01 NG/ML (ref 0–0.03)
WBC # BLD: 6.3 E9/L (ref 4.5–11.5)

## 2021-04-28 PROCEDURE — 70551 MRI BRAIN STEM W/O DYE: CPT

## 2021-04-28 PROCEDURE — 97162 PT EVAL MOD COMPLEX 30 MIN: CPT

## 2021-04-28 PROCEDURE — 97530 THERAPEUTIC ACTIVITIES: CPT

## 2021-04-28 PROCEDURE — 80048 BASIC METABOLIC PNL TOTAL CA: CPT

## 2021-04-28 PROCEDURE — 97129 THER IVNTJ 1ST 15 MIN: CPT

## 2021-04-28 PROCEDURE — 95816 EEG AWAKE AND DROWSY: CPT

## 2021-04-28 PROCEDURE — 2580000003 HC RX 258: Performed by: FAMILY MEDICINE

## 2021-04-28 PROCEDURE — 6360000002 HC RX W HCPCS: Performed by: FAMILY MEDICINE

## 2021-04-28 PROCEDURE — 97166 OT EVAL MOD COMPLEX 45 MIN: CPT

## 2021-04-28 PROCEDURE — 36415 COLL VENOUS BLD VENIPUNCTURE: CPT

## 2021-04-28 PROCEDURE — 6370000000 HC RX 637 (ALT 250 FOR IP): Performed by: CLINICAL NURSE SPECIALIST

## 2021-04-28 PROCEDURE — 95816 EEG AWAKE AND DROWSY: CPT | Performed by: PSYCHIATRY & NEUROLOGY

## 2021-04-28 PROCEDURE — 99233 SBSQ HOSP IP/OBS HIGH 50: CPT | Performed by: CLINICAL NURSE SPECIALIST

## 2021-04-28 PROCEDURE — 2060000000 HC ICU INTERMEDIATE R&B

## 2021-04-28 PROCEDURE — 92523 SPEECH SOUND LANG COMPREHEN: CPT

## 2021-04-28 PROCEDURE — 2700000000 HC OXYGEN THERAPY PER DAY

## 2021-04-28 PROCEDURE — 85025 COMPLETE CBC W/AUTO DIFF WBC: CPT

## 2021-04-28 PROCEDURE — 80053 COMPREHEN METABOLIC PANEL: CPT

## 2021-04-28 PROCEDURE — 6370000000 HC RX 637 (ALT 250 FOR IP): Performed by: FAMILY MEDICINE

## 2021-04-28 PROCEDURE — 84484 ASSAY OF TROPONIN QUANT: CPT

## 2021-04-28 RX ORDER — CLOPIDOGREL BISULFATE 75 MG/1
75 TABLET ORAL DAILY
Status: DISCONTINUED | OUTPATIENT
Start: 2021-04-28 | End: 2021-05-05 | Stop reason: HOSPADM

## 2021-04-28 RX ADMIN — Medication 10 ML: at 08:08

## 2021-04-28 RX ADMIN — CLOPIDOGREL 75 MG: 75 TABLET, FILM COATED ORAL at 21:13

## 2021-04-28 RX ADMIN — ENOXAPARIN SODIUM 40 MG: 40 INJECTION SUBCUTANEOUS at 08:08

## 2021-04-28 RX ADMIN — ASPIRIN 81 MG: 81 TABLET, COATED ORAL at 08:08

## 2021-04-28 RX ADMIN — ATORVASTATIN CALCIUM 40 MG: 40 TABLET, FILM COATED ORAL at 21:13

## 2021-04-28 NOTE — PLAN OF CARE
Problem: Skin Integrity:  Goal: Will show no infection signs and symptoms  Description: Will show no infection signs and symptoms  4/28/2021 0837 by Silvino Carias RN  Outcome: Ongoing  4/28/2021 0126 by Yariel Swanson RN  Outcome: Met This Shift  Goal: Absence of new skin breakdown  Description: Absence of new skin breakdown  4/28/2021 0837 by Sivlino Carias RN  Outcome: Ongoing  4/28/2021 0126 by Yariel Swanson RN  Outcome: Met This Shift     Problem: Falls - Risk of:  Goal: Will remain free from falls  Description: Will remain free from falls  4/28/2021 0837 by Silvino Carias RN  Outcome: Ongoing  4/28/2021 0126 by Yariel Swanson RN  Outcome: Met This Shift  Goal: Absence of physical injury  Description: Absence of physical injury  4/28/2021 0837 by Silvino Carias RN  Outcome: Ongoing  4/28/2021 0126 by Yariel Swanson RN  Outcome: Met This Shift     Problem: Confusion - Acute:  Goal: Absence of continued neurological deterioration signs and symptoms  Description: Absence of continued neurological deterioration signs and symptoms  Outcome: Ongoing  Goal: Mental status will be restored to baseline  Description: Mental status will be restored to baseline  Outcome: Ongoing     Problem: Discharge Planning:  Goal: Ability to perform activities of daily living will improve  Description: Ability to perform activities of daily living will improve  Outcome: Ongoing  Goal: Participates in care planning  Description: Participates in care planning  Outcome: Ongoing     Problem: Injury - Risk of, Physical Injury:  Goal: Will remain free from falls  Description: Will remain free from falls  4/28/2021 0837 by Silvino Carias RN  Outcome: Ongoing  4/28/2021 0126 by Yariel Swanson RN  Outcome: Met This Shift  Goal: Absence of physical injury  Description: Absence of physical injury  4/28/2021 0837 by Silvino Carias RN  Outcome: Ongoing  4/28/2021 0126 by Yariel Swanson RN  Outcome: Met This Shift     Problem: Mood - Altered:  Goal: Mood stable  Description: Mood stable  Outcome: Ongoing  Goal: Absence of abusive behavior  Description: Absence of abusive behavior  Outcome: Ongoing  Goal: Verbalizations of feeling emotionally comfortable while being cared for will increase  Description: Verbalizations of feeling emotionally comfortable while being cared for will increase  Outcome: Ongoing     Problem: Psychomotor Activity - Altered:  Goal: Absence of psychomotor disturbance signs and symptoms  Description: Absence of psychomotor disturbance signs and symptoms  Outcome: Ongoing     Problem: Sensory Perception - Impaired:  Goal: Demonstrations of improved sensory functioning will increase  Description: Demonstrations of improved sensory functioning will increase  Outcome: Ongoing  Goal: Decrease in sensory misperception frequency  Description: Decrease in sensory misperception frequency  Outcome: Ongoing  Goal: Able to refrain from responding to false sensory perceptions  Description: Able to refrain from responding to false sensory perceptions  Outcome: Ongoing  Goal: Demonstrates accurate environmental perceptions  Description: Demonstrates accurate environmental perceptions  Outcome: Ongoing  Goal: Able to distinguish between reality-based and nonreality-based thinking  Description: Able to distinguish between reality-based and nonreality-based thinking  Outcome: Ongoing  Goal: Able to interrupt nonreality-based thinking  Description: Able to interrupt nonreality-based thinking  Outcome: Ongoing     Problem: Sleep Pattern Disturbance:  Goal: Appears well-rested  Description: Appears well-rested  Outcome: Ongoing

## 2021-04-28 NOTE — PROGRESS NOTES
Dr. Richardson Quiroz regarding Trops q 6 for 3 occurrences. Trop was drawn at 1600, 2300 and requested an order for 0500 trop to be drawn.

## 2021-04-28 NOTE — PROGRESS NOTES
Hospitalist Progress Note      SYNOPSIS: Patient admitted on 2021 for AMS      SUBJECTIVE:    Patient seen and examined at bedside in NAD. Pt noted to have left judy neglect when ambulating with PT today. Pt without gross focal deficits. No acute events overnight. Mentation significantly improved. Records reviewed. Stable overnight. No other overnight issues reported. Temp (24hrs), Av.1 °F (36.7 °C), Min:97.5 °F (36.4 °C), Max:98.9 °F (37.2 °C)    DIET: DIET CARB CONTROL; Carb Control: 4 carb choices (60 gms)/meal  CODE: Full Code    Intake/Output Summary (Last 24 hours) at 2021 154  Last data filed at 2021 1536  Gross per 24 hour   Intake 60 ml   Output 2300 ml   Net -2240 ml       OBJECTIVE:    BP (!) 146/65   Pulse 83   Temp 97.9 °F (36.6 °C) (Temporal)   Resp 20   SpO2 98%     General appearance: No apparent distress, appears stated age and cooperative. HEENT:  Conjunctivae/corneas clear. Neck: Supple. No jugular venous distention. Respiratory: Clear to auscultation bilaterally, normal respiratory effort  Cardiovascular: Regular rate rhythm, normal S1-S2  Abdomen: Soft, nontender, nondistended  Musculoskeletal: No clubbing, cyanosis, no bilateral lower extremity edema. Brisk capillary refill. Skin:  No rashes  on visible skin  Neurologic: awake, alert and following commands     ASSESSMENT:  Acute Encephalopathy  Acute Ischemic CVA  KEE  Lactic Acidosis  Hyperglycemia  Microcytic Anemia     PLAN:  - CTH on admission negative for acute intracranial pathology  - CTA Head and Neck with mild atherosclerotic disease.  No large vessel occlusion  - CT brain perfusion with there is evidence for core infarct in the right frontal lobe likely old, no acute infarct or ischemia identified  - MRI brain without contrast showed findings consistent with acute infarct in the right middle cerebral artery territory involving the right parietal lobe, posterior temporal lobe and insular cortex. - Neurology c/s noted and appreciated; following  - F/U EEG  - Obtain TTE with bubble  - Pt may need ADAM and event monitor  - Started on DAPT (ASA and Plavix), High intensity statin,   - Maintain on Tele  - Aspiration, Fall and Seizure precautions  - SLP  - Cont IVF x 1 day, encourage PO hydration.  Renal function normal    DISPOSITION: Intermediate    Medications:  REVIEWED DAILY    Infusion Medications    sodium chloride      sodium chloride 75 mL/hr at 04/27/21 0605     Scheduled Medications    clopidogrel  75 mg Oral Daily    sodium chloride flush  5-40 mL Intravenous 2 times per day    aspirin  81 mg Oral Daily    Or    aspirin  300 mg Rectal Daily    enoxaparin  40 mg Subcutaneous Daily    atorvastatin  40 mg Oral Nightly     PRN Meds: sodium chloride flush, sodium chloride, promethazine **OR** ondansetron, polyethylene glycol, perflutren lipid microspheres    Labs:     Recent Labs     04/26/21 2258 04/27/21  0737   WBC 9.7 7.2   HGB 9.7* 7.9*   HCT 33.0* 27.7*    203       Recent Labs     04/26/21 2258 04/28/21  0809    135   K 4.4 3.6   CL 94* 102   CO2 26 23   BUN 20 9   CREATININE 1.2* 0.7   CALCIUM 9.3 8.5*       Recent Labs     04/26/21  2258   PROT 7.6   ALKPHOS 102   ALT 6   AST 9   BILITOT 0.5   LIPASE 28       Recent Labs     04/26/21  2258   INR 1.2       Recent Labs     04/27/21  1603 04/27/21  2303 04/28/21  0808   TROPONINI <0.01 <0.01 <0.01       Chronic labs:    Lab Results   Component Value Date    CHOL 156 04/27/2021    TRIG 125 04/27/2021    HDL 30 04/27/2021    LDLCALC 101 (H) 04/27/2021    TSH 0.354 04/27/2021    INR 1.2 04/26/2021    LABA1C 5.7 (H) 04/27/2021       Radiology: REVIEWED DAILY    +++++++++++++++++++++++++++++++++++++++++++++++++  Alyssa Ornelas Physician - [address], [facility]  +++++++++++++++++++++++++++++++++++++++++++++++++  NOTE: This report was transcribed using voice recognition

## 2021-04-28 NOTE — PLAN OF CARE
Problem: Skin Integrity:  Goal: Will show no infection signs and symptoms  Description: Will show no infection signs and symptoms  4/28/2021 0126 by Liberty Corona RN  Outcome: Met This Shift  4/27/2021 1530 by Chauncey Mcahado RN  Outcome: Ongoing  Goal: Absence of new skin breakdown  Description: Absence of new skin breakdown  4/28/2021 0126 by Liberty Corona RN  Outcome: Met This Shift  4/27/2021 1530 by Chauncey Machado RN  Outcome: Ongoing     Problem: Falls - Risk of:  Goal: Will remain free from falls  Description: Will remain free from falls  Outcome: Met This Shift  Goal: Absence of physical injury  Description: Absence of physical injury  Outcome: Met This Shift

## 2021-04-28 NOTE — PROGRESS NOTES
Adeel Ramirez is a 73 y.o. right handed female     Patient admitted with altered mental status  Son at bedside stated she had a stroke in 2017 which she refused to be admitted for and walked out after her s/s resolved. In 2017, she was unable to speak and had right sided weakness.  Her s/s completely resolved and she left the hospital.   She was following at the IM clinic here but appears she has not had an appt since 2012     She was admitted with confusion but no focal neuro deficits   MRI Brain was obtained which did demonstrate and acute stroke on DWI   Unfortunately, DWI were the only images we were able to obtain d/t her lack of cooperation     Son feels she still seems \"foggy\"  No focal arm or leg weakness   No pain reported  No vertigo reported to me     No chest pain or palpitations  No SOB  No vertigo, lightheadedness or loss of consciousness  No falls, tripping or stumbling  No incontinence of bowels or bladder  No itching or bruising appreciated    ROS otherwise negative     Allergies as of 04/26/2021    (No Known Allergies)     Objective:     BP (!) 146/65   Pulse 83   Temp 97.9 °F (36.6 °C) (Temporal)   Resp 20   SpO2 98%      General appearance: alert, appears stated age and cooperative  Head: Normocephalic, without obvious abnormality, atraumatic  Extremities: no cyanosis or edema  Pulses: 2+ and symmetric  Skin: no rashes or lesions    Mental Status: Alert, oriented to person and place     Speech: clear  Language: appropriate but laconic     No anomia  No trouble following commands     Cranial Nerves:  I: smell    II: visual acuity     II: visual fields Full   II: pupils MASHA   III,VII: ptosis None   III,IV,VI: extraocular muscles  EOMI without nystagmus    V: mastication Normal   V: facial light touch sensation  Normal   V,VII: corneal reflex  Present   VII: facial muscle function - upper     VII: facial muscle function - lower Normal   VIII: hearing Normal   IX: soft palate elevation patient's lab and imaging studies at this time.     Assessment:     Large right posterior MCA ischemic infarct most likely causing confusion from her non-dominant hemispheric involvement    Her stroke does appear embolic and with her event in 2017, she will need further cardiac investigation prior to discharge     tobacco abuse     Plan:     Echo pending    Most likely will need ADAM and event monitoring     Continue ASA but will add Plavix for now   Continue statin     Will need stroke clinic on discharge     Bala Laboy  11:33 AM  4/28/2021

## 2021-04-28 NOTE — PROGRESS NOTES
Functional  Functional Description  Impaired  Response Naming: Impaired    Conversation:      Confusion was noted during conversation    COGNITION     Attention/Orientation  Attention: Easily Distracted  Orientation:  Oriented to Person, Place    Memory   Immediate Recall: Repeated 2/3    Delayed Recall:   Recalled 0/3    Long Term Recall:   Recalled Address, Birthdate and Family    Organization/Problem Solving/Reasoning   Verbal Sequencing:   Impaired        Verbal Problem solving:   Impaired          CLINICAL OBSERVATIONS NOTED DURING THE EVALUATION  Latent responses and Cueing was required                  Prognosis for improvements is good  This plan will be re-evaluated and revised in 1 week  if warranted. Patient stated goals: Did not state  Treatment goals discussed with Patient   The Patient did not demonstrate complete understanding of the diagnosis, prognosis and plan of care       CPT code:    28444  eval speech sound lang comprehension        The admitting diagnosis and active problem list, as listed below have been reviewed prior to initiation of this evaluation.         ACTIVE PROBLEM LIST:   Patient Active Problem List   Diagnosis    Tobacco abuse    Hyperlipidemia    Stroke-like symptoms    Encephalopathy acute    Cerebrovascular accident (CVA) due to embolism of right middle cerebral artery ([facility] [address]

## 2021-04-28 NOTE — PROGRESS NOTES
Unremarkable. Patient education  Pt educated on [employer] technique and awareness of L side when ambulating. Patient response to education:   Pt verbalized understanding Pt demonstrated skill Pt requires further education in this area   Yes Partial/with assistance Yes     ASSESSMENT:    Comments:   Pt was in bed with son present upon room entry, agreeable to PT evaluation. Pt is oriented but very delayed. Pt has flat affect. Pt is very easily distracted and required cueing to focus on task. Pt has L inattention with R sided gaze but is able to correct to midline easily with cueing. Pt completed supine to sit transfer without need for hands on assist. Pt has fair sitting balance at EOB. Pt completed stand pivot to chair without AD. Pt was moderately unsteady and required assistance for controlled decent. Pt was agreeable to trial [employer] for ambulation. Pt ambulated x2 reps with [employer]. Pt is mildly unsteady and tends to deviate to the L with [employer]. Pt occasionally bumped into obstacles on L side and required assistance to correct/avoid objects. Pt's L foot occasionally bumped into device. Gait quality improved slightly as session continued. Pt is easily distracted and reached for [employer] bottle when ambulating back to bed; pt cued for safety. Pt was left seated in chair with OT present at conclusion of session. Treatment:  Patient practiced and was instructed in the following treatment:     Therapeutic activities: Pt completed all therapeutic activities noted above. Pt was cued for technique during supine to sit transfer. Pt was cued for hand placement during sit <> stand transfers. Pt completed multiple transfers from surfaces of varying heights (EOB x1, chair x2). Pt ambulated x2 reps with [employer] as standing balance, strength, and WW technique were challenged. Pt was constantly cued for [employer] technique and for awareness of L side when ambulating in close quarters. Pt's/family goals:   1. To return home.     Patient and or family understand(s) diagnosis, prognosis, and plan of care. Yes. PLAN:    Current Treatment Recommendations   [x] Strengthening     [] ROM   [x] Balance Training   [] Endurance Training   [x] Transfer Training   [x] Gait Training   [x] Stair Training   [x] Positioning    [x] Safety and Education Training   [x] Patient/Caregiver Education   [] HEP  [] Other     PT care will be provided in accordance with the objectives noted above. Exercises and functional mobility practice will be used as well as appropriate assistive devices or modalities to obtain goals. Patient and family education will also be administered as needed. Frequency of treatments: 2-5x/week x 2-3 days. Time in: 1100  Time out: 1120    Total Treatment Time 15 minutes     Evaluation Time includes thorough review of current medical information, gathering information on past medical history/social history and prior level of function, completion of standardized testing/informal observation of tasks, assessment of data and education on plan of care and goals.     CPT codes:  [] Low Complexity PT evaluation 63106  [x] Moderate Complexity PT evaluation 97933  [] High Complexity PT evaluation 65153  [] PT Re-evaluation 45178  [] Gait training 87241 0 minutes  [] Manual therapy 21905 0 minutes  [x] Therapeutic activities 67561 15 minutes  [] Therapeutic exercises 80693 0 minutes  [] Neuromuscular reeducation 42334 0 minutes     Wilberto Thakkar, PT, DPT  [id number]

## 2021-04-28 NOTE — PROGRESS NOTES
When pt is sternal rubbed, she gets very angry, opens eyes and shouts profanities. But as soon as sternal rub has stopped, pt falls back to sleep immediately.

## 2021-04-28 NOTE — PROGRESS NOTES
Patient completely AMS, used all straps and cushions- could not hold still- only a diffusion was able to be done- barely.  Best images obtainable

## 2021-04-28 NOTE — PROCEDURES
[address] Report    MRN: [id number]   PATIENT NAME: Israel Reed   DATE OF REPORT: 2021    DATE OF SERVICE: 2021   PHYSICIAN NAME: Rachael Goldberg DO   Referring Physician: Rachael Goldberg DO       Patient's : 1955   Patient's Age: 73 y.o. Gender: female     PROCEDURE: Routine EEG with video      Clinical Interpretation: This abnormal study showed evidence of:    1. An increased potential for focal seizures from the right anterior temporal region  2. Moderate to severe nonspecific cerebral dysfunction of the right temporal region    Structural abnormalities should be considered for the findings above and appropriate imaging obtained if clinically indicated. No seizures were noted during this study. ____________________________  Electronically signed by: Rachael Goldberg DO, 2021 5:05 PM      Patient Clinical Information   Reason for Study: Patient with altered mental status  Patient State: Awake  Primary neurological diagnosis: Episode of uncertain etiology   Primary indication for monitoring: Characterization of spell    Pertinent Medications and Treatments  None    Sedatives administered: No  Intubated: No  Pharmacological paralytic: No    Reporting Period  Start of Study: 1349, 2021   End of Study:  141, 2021       EEG Description  Digital video and scalp EEG monitoring was performed using the standard protocol for this laboratory. Scalp electrodes were applied in the international 10/20 system. Multiple digital montage arrangements were utilized for evaluation. EKG and video were recorded.      Background:      Occipital rhythm (posterior dominant rhythm or PDR): Present, more well defined on left than right  Frequency: 8.5 Hz  Voltage: Medium   Organization: fair   Reactivity to eye opening/closure: fair    Drowsiness: Absent  Sleep: Absent    Technical and Activation Procedures:  Hyperventilation: Not done        Photic stimulation: Done - physiologic driving noted        Reactivity to stimulation: Yes    Abnormalities:    I. Seizures? No    II. Rhythmic or Periodic Patterns? Occasional lateralized rhythmic delta activity noted at T4, F8    III. Other Abnormalities?         Frequent irregular delta activity noted at T4, F8>T6

## 2021-04-28 NOTE — PROGRESS NOTES
B socks  Stand by Assist    Bathing Moderate Assist  Stand by Assist    Toileting Minimal Assist   Supervision    Bed Mobility  NT  Rolling: Independent   Supine to sit: Modified Hernandez   Sit to supine: Modified Hernandez    Functional Transfers Mod A  SBA   Functional Mobility Mod A w/ w/  Light mobility in room to prep for ADL's  SBA   Balance Sitting: SBA  Standing: Min><Mod A w/ w/w     Activity Tolerance Fair  Good   Visual/  Perceptual Glasses: no  L inattention noted during functional ambulation and ADL's. Frequent cues to attend to left required                Hand dominance: R   Strength ROM Additional Info:    RUE  4/5  WFL   good  and wfl FMC/dexterity noted during ADL tasks    Finger opp: wfl  Finger><nose: wfl     LUE 4/5  WFL   good  and wfl FMC/dexterity noted during ADL tasks    Finger><nose: fair  Easily distracted to task - cues provided     H[facility]: [facility]  Sensation:  No c/o numbness or tingling  Light touch: intact B UE's, facial  Tone: WFL  Edema:                    Comments: Upon arrival patient seated in chair. Pt agreeable to OT session this date. At end of session, patient seated in chair (son present - reinforced importance of notifying staff when leaving for pt safety) with call light and phone within reach, all lines and tubes intact. Overall patient demonstrated decreased independence and safety during completion of ADL/functional transfer/mobility tasks. Pt would benefit from continued skilled OT to increase safety and independence with completion of ADL/IADL tasks for functional independence and quality of life.     Treatment: OT treatment provided this date includes:  Facilitation of unsupported sitting balance (addressing posture, weight shifting and dynamic reaching to prep for ADL's), functional transfers (various surfaces w/ education/cues for safety/hand placement, attention), standing tolerance tasks (addressing posture, balance and activity tolerance while incorporating light functional reaching impacting ADLs and functional activity. Cues to attend to left provided) and functional ambulation tasks with w/w (in preparation to/from bathroom w/ education/cuing on posture, w/w management, attention to task and safety. Pt very easily distracted and inattention to left noted - assistance and cues to correct (pt able to very briefly correct)). Therapist facilitated self-care retraining: LB self-care tasks (socks, simulated pants), simulated toileting task and standing grooming tasks while educating/cuing pt on modified techniques, posture, safety and energy conservation techniques. Skilled monitoring of HR, O2 sats and pts response to treatment. mod  Profile and History- med (extensive chart review)  Assessment of Occupational Performance and Identification of Deficits- med  Clinical Decision Making- med    Evaluation Time includes thorough review of current medical information, gathering information on past medical history/social history and prior level of function, completion of standardized testing/informal observation of tasks, assessment of data and education on plan of care and goals.     Assessment of current deficits  Functional mobility [x]  ADLs [x] Strength [x]  Cognition [x]  Functional transfers  [x] IADLs [x] Safety Awareness [x]  Endurance [x]  Fine Motor Coordination [] Balance [x] Vision/perception [x] Sensation []   Gross Motor Coordination [] ROM [] Delirium []                  Motor Control []    Plan of Care: 2-4 days/week for 1-2 weeks PRN   Instruction/training on adapted ADL techniques and AE recommendations to increase functional independence within precautions  Training on energy conservation strategies/techniques to improve independence/tolerance for self-care routine  Functional transfer/mobility training/DME recommendations for increased independence, safety, and fall prevention  Patient/Family education to increase follow through with safety techniques and functional independence  Recommendation of environmental modifications for increased safety with functional transfers/mobility and ADLs  Cognitive retraining/development of therapeutic activities to improve problem solving, judgement, memory, and attention for increased safety/participation in ADL/IADL tasks  Therapeutic exercise to improve motor endurance, ROM, and functional strength for ADLs/functional transfers  Therapeutic activities to facilitate/challenge dynamic balance, stand tolerance, fine motor dexterity/in-hand manipulation for increased independence with ADLs  Positioning to improve functional independence    Rehab Potential: Good for established goals    Patient / Family Goal: Not stated     Patient and/or family were instructed on diagnosis, prognosis/goals and plan of care. Demonstrated fair understanding. [] Malnutrition indicators have been identified and nursing has been notified to ensure a dietitian consult is ordered.        Mod Evaluation completed +              Time In: 11:23  Time Out: 11:47  Total Treatment Time: 15 minutes    Min Units   OT Eval Low 54162     OT Eval Medium 97166 X 1   OT Eval High 71631     OT Re-Eval [id number]     Therapeutic Ex 53342     Therapeutic Activities 58752 10 1   ADL/Self Care 58893 5 0   Orthotic Management 75201     Neuro Re-Ed [facility] [address], OTR/L [id number]

## 2021-04-29 LAB
ALBUMIN SERPL-MCNC: 3.2 G/DL (ref 3.5–5.2)
ALP BLD-CCNC: 83 U/L (ref 35–104)
ALT SERPL-CCNC: 5 U/L (ref 0–32)
ANION GAP SERPL CALCULATED.3IONS-SCNC: 9 MMOL/L (ref 7–16)
AST SERPL-CCNC: 10 U/L (ref 0–31)
BASOPHILS ABSOLUTE: 0.04 E9/L (ref 0–0.2)
BASOPHILS RELATIVE PERCENT: 0.7 % (ref 0–2)
BILIRUB SERPL-MCNC: 0.3 MG/DL (ref 0–1.2)
BUN BLDV-MCNC: 8 MG/DL (ref 6–23)
CALCIUM SERPL-MCNC: 8.5 MG/DL (ref 8.6–10.2)
CHLORIDE BLD-SCNC: 101 MMOL/L (ref 98–107)
CO2: 25 MMOL/L (ref 22–29)
CREAT SERPL-MCNC: 0.8 MG/DL (ref 0.5–1)
EOSINOPHILS ABSOLUTE: 0.19 E9/L (ref 0.05–0.5)
EOSINOPHILS RELATIVE PERCENT: 3.1 % (ref 0–6)
GFR AFRICAN AMERICAN: >60
GFR NON-AFRICAN AMERICAN: >60 ML/MIN/1.73
GLUCOSE BLD-MCNC: 107 MG/DL (ref 74–99)
HCT VFR BLD CALC: 29.6 % (ref 34–48)
HEMOGLOBIN: 8.8 G/DL (ref 11.5–15.5)
IMMATURE GRANULOCYTES #: 0.02 E9/L
IMMATURE GRANULOCYTES %: 0.3 % (ref 0–5)
LYMPHOCYTES ABSOLUTE: 1.07 E9/L (ref 1.5–4)
LYMPHOCYTES RELATIVE PERCENT: 17.5 % (ref 20–42)
MCH RBC QN AUTO: 21.1 PG (ref 26–35)
MCHC RBC AUTO-ENTMCNC: 29.7 % (ref 32–34.5)
MCV RBC AUTO: 70.8 FL (ref 80–99.9)
METER GLUCOSE: 133 MG/DL (ref 74–99)
MONOCYTES ABSOLUTE: 0.4 E9/L (ref 0.1–0.95)
MONOCYTES RELATIVE PERCENT: 6.5 % (ref 2–12)
NEUTROPHILS ABSOLUTE: 4.41 E9/L (ref 1.8–7.3)
NEUTROPHILS RELATIVE PERCENT: 71.9 % (ref 43–80)
PDW BLD-RTO: 19.8 FL (ref 11.5–15)
PLATELET # BLD: 259 E9/L (ref 130–450)
PMV BLD AUTO: 8.8 FL (ref 7–12)
POTASSIUM REFLEX MAGNESIUM: 3.6 MMOL/L (ref 3.5–5)
RBC # BLD: 4.18 E12/L (ref 3.5–5.5)
SODIUM BLD-SCNC: 135 MMOL/L (ref 132–146)
TOTAL PROTEIN: 6.5 G/DL (ref 6.4–8.3)
WBC # BLD: 6.1 E9/L (ref 4.5–11.5)

## 2021-04-29 PROCEDURE — 2060000000 HC ICU INTERMEDIATE R&B

## 2021-04-29 PROCEDURE — 99232 SBSQ HOSP IP/OBS MODERATE 35: CPT | Performed by: CLINICAL NURSE SPECIALIST

## 2021-04-29 PROCEDURE — 80053 COMPREHEN METABOLIC PANEL: CPT

## 2021-04-29 PROCEDURE — 6360000002 HC RX W HCPCS: Performed by: FAMILY MEDICINE

## 2021-04-29 PROCEDURE — 36415 COLL VENOUS BLD VENIPUNCTURE: CPT

## 2021-04-29 PROCEDURE — 97530 THERAPEUTIC ACTIVITIES: CPT

## 2021-04-29 PROCEDURE — 6370000000 HC RX 637 (ALT 250 FOR IP): Performed by: CLINICAL NURSE SPECIALIST

## 2021-04-29 PROCEDURE — 6370000000 HC RX 637 (ALT 250 FOR IP): Performed by: FAMILY MEDICINE

## 2021-04-29 PROCEDURE — 85025 COMPLETE CBC W/AUTO DIFF WBC: CPT

## 2021-04-29 PROCEDURE — 6370000000 HC RX 637 (ALT 250 FOR IP): Performed by: STUDENT IN AN ORGANIZED HEALTH CARE EDUCATION/TRAINING PROGRAM

## 2021-04-29 PROCEDURE — 97535 SELF CARE MNGMENT TRAINING: CPT

## 2021-04-29 PROCEDURE — 99222 1ST HOSP IP/OBS MODERATE 55: CPT | Performed by: PHYSICAL MEDICINE & REHABILITATION

## 2021-04-29 PROCEDURE — 82962 GLUCOSE BLOOD TEST: CPT

## 2021-04-29 PROCEDURE — 2580000003 HC RX 258: Performed by: FAMILY MEDICINE

## 2021-04-29 RX ORDER — LISINOPRIL 5 MG/1
5 TABLET ORAL DAILY
Status: DISCONTINUED | OUTPATIENT
Start: 2021-04-29 | End: 2021-05-05 | Stop reason: HOSPADM

## 2021-04-29 RX ADMIN — SODIUM CHLORIDE: 9 INJECTION, SOLUTION INTRAVENOUS at 00:59

## 2021-04-29 RX ADMIN — ASPIRIN 81 MG: 81 TABLET, COATED ORAL at 10:09

## 2021-04-29 RX ADMIN — CLOPIDOGREL 75 MG: 75 TABLET, FILM COATED ORAL at 10:09

## 2021-04-29 RX ADMIN — ENOXAPARIN SODIUM 40 MG: 40 INJECTION SUBCUTANEOUS at 10:09

## 2021-04-29 RX ADMIN — Medication 10 ML: at 22:36

## 2021-04-29 RX ADMIN — ATORVASTATIN CALCIUM 40 MG: 40 TABLET, FILM COATED ORAL at 22:36

## 2021-04-29 RX ADMIN — LISINOPRIL 5 MG: 10 TABLET ORAL at 10:14

## 2021-04-29 ASSESSMENT — PAIN SCALES - GENERAL
PAINLEVEL_OUTOF10: 0

## 2021-04-29 NOTE — PROGRESS NOTES
OT BEDSIDE TREATMENT NOTE      Date:2021  Patient Name: Deon Pollock  MRN: [id number]  : 1955  Room: [address] [place] [address]     Per OT Eval:    Evaluating [facility], OTR/L #[id number]     AM-PAC Daily Activity Raw Score:   Recommended Adaptive Equipment: TBD      Diagnosis: Encephalopathy acute [G93.40]     Modified Barrie Scale (MRS)  Score     Description  0             No symptoms  1             No significant disability despite symptoms  2             Slight disability; able to look after own affairs  3             Moderate disability; able to ambulate without assist/ requires assist with ADLs  4             Moderate/Severe disability;requires assist to ambulate/assist with ADLs  5             Severe disability;bedridden/incontinent   6               Score:  4     Referring physician: Eduard Gonzalez MD     Pertinent Medical History: chronic back pain, headache, HTN, OA, tobacco abuse     Precautions:  Falls, TSM, L inattention, bed alarm     Home Living: Pt lives with son in 2 floor home.  4 GABRIEL, 1 handrail  Bath and bedroom on 2nd floor - flight of stairs   Bathroom setup: tub only  Equipment owned: n/a     Prior Level of Function: independent with ADLs , shares IADLs; ambulated independently w/o AD  Driving: no     Pain Level: Pt c/o no pain this session      Cognition: A&O: 3/4 (not to situation) Follows 1 step directions, increased difficulty following complex tasks, easily distracted, impulsive with movements, pleasant & cooperative   [place]              Memory:  fair               Sequencing:  fair               Problem solving:  fair               Judgement/safety:  fair                 Functional Assessment:    Initial Eval Status  Date: 21 Treatment Status  Date:  21 Short Term Goals/LTG  Treatment frequency: 1-4x/wk   Feeding Stand by Assist  Supervision  Able to open packets, pour sugar in cup & stir  (upon arrival pt knocked over bottle of pepsi that was on her left side)  Modified [place]    Grooming Minimal Assist   Standing w/ w/w  SBA  Seated in chair in this date  Modified [place]    UB Dressing Minimal Assist  Min A  Doff/danika gown seated in chair, cues to attend to L side  Modified [place]    LB Dressing Moderate Assist   Simulated pants     Improved to min A to don/doff B socks  Mod A  Simulated pants, Doff/danika socks using cross over technique, cues to incorporate L hand with tasks  Stand by Assist    Bathing Moderate Assist  Mod A  Simulated  Stand by Assist    Toileting Minimal Assist   Min A  Simulated, suazo present  Supervision    Bed Mobility  NT SBA  Cues for hand placement & technique  Rolling: Independent   Supine to sit: Modified [place]   Sit to supine: Modified [place]    Functional Transfers Mod A Min A  Cues for hand placement & technique   SBA   Functional Mobility Mod A w/ w/  Light mobility in room to prep for ADL's  Min A  Short distance with walker  SBA   Balance Sitting: SBA  Standing: Min><Mod A w/ w/w  Sitting: SBA  Standing: Min A  With walker      Activity Tolerance Fair Fair  Good   Visual/  Perceptual Glasses: no  L inattention noted during functional ambulation and ADL's. Frequent cues to attend to left required                     Education:  Pt was educated through out treatment regarding proper technique & safety with bed mobility, functional transfers & mobility, increasing awareness of L side of body & environment along with ADL compensatory strategies to ease tasks to improve safety, prevent falls and allow pt to return home safely. Comments: Upon arrival pt was in bed & agreeable for therapy. At end of session pt was seated in chair all lines and tubes intact, call light within reach. · Pt has made Fair progress towards set goals.    · Continue with current plan of care    Treatment Time In: 2:35            Treatment Time Out: 3:00          Treatment Charges: Mins Units   Ther Ex  05926     Manual Therapy [address] [place] [address] 10 1   ADL/Home Mgt 25063 15 1   Neuro Re-ed 10552     Group Therapy      Orthotic manage/training  14193     Non-Billable Time     Total Timed Treatment 25 2       Barbara CARLSON  [address] [place] [address], [address] [place] [address]

## 2021-04-29 NOTE — PROGRESS NOTES
Acute Rehab Pre-Admission Screen      Referral date: 4/28/21  Onset/Hospital Admit Date: 4/26/2021 10:50 PM    Current Location: [address] [place] [address]    Name: Solange Prost: 1955  Age: 73 y.o. Admitting Diagnosis: CVA   Address: [address] [facility]  Home Phone: [phone] (home)  Waldo Vang #:     Sex: female  Race:   Marital Status:    Ethnic/Cultural/Jehovah's witness Considerations: none    Advanced Directives: [x] Full Code  [] [address] [] Medications only       [] Living Will  [] DPOA      []Organ donor      [] No mechanical breathing or ventilation     [] no tube feeding, nutrition or hydration      [x] Patient does not have advanced directives or living will        COVERAGE INFORMATION   Primary Insurer: [employer] gold  Payor Contact:   Phone:   FAX:  Authorization #:     Verified coverage: [] Patient  [] Family/caregiver    [x] financial department [] insurance carrier    COVID SCREEN DATE: 5/3/21 Result: negative      MEDICAL UPDATE:  History of present admission: 73 y.o. female with history of prior CVA in 2017 with no residual deficits, who presented to [facility] on 4/26/2021 due to altered mental status and slurred speech. Per charting she became unresponsive while sitting on the couch and began to have slurred speech. In the ED she was nonverbal. Brain MRI demonstrated an acute right MCA CVA. She denies weakness, numbness, difficulty with speech or swallowing. She denies vision change. Her stroke appears embolic. ADAM is pending and Neurology recommends event monitoring  4/29- per neurology note, judy-neglect noted. Echo still pending. ADAM ordered. 5/2-DVT with + PFO with R to L shunting on ADAM. Eliquis and statin. Sending thrombophilia panel.      PHYSICIAN / REFERRAL INFORMATION  Referring Physician: Loraine  Attending Physician: Keanu Marion MD  Primary Care Physician: Shirley Moss MD (Inactive)  Consultants/Opinions (see full consult notes on chart): neurology- CVA  Cardiology- embolic cva    SOCIAL INFORMATION  Primary  Contact: Luke Hackett  Relationship: son  Primary Phone: [phone]      Previous Community Services: none  Caregiver available: [x] Yes [] No Hours per day available: tbd  Patient previously employed:  [] Yes [] Part Time [] Full Time [x] No [x] Retired  Occupation/Profession: retired  Prior living arrangements: [x] Home  [] Assisted living  [] SNF [] Other  Lived with:  [] Alone  [] Spouse  [x] Family  [] Other  Lived with: son  Contact phone: see above  Home:  2 story home  4 entry steps  Rails: 1  Steps:  flight to 2nd floor  Rails:  1   Bedroom: [] 1st floor  [x] 2nd floor    Bathroom:  [] 1st floor  [x] 2nd floor    Prior Functional Level: Independent for: ADLs, shares IADLs  Assistance for: shares IADLs  Dependent for: drove  Dominant hand: [x] Right  [] Left    Previous Home Equipment:  [] Cane [] Grab bars [] Orthotic / prosthetic   [] Shower chair [] Tub bench  [] 3-in-1 Commode [] Long handle sponge   [] Oxygen [] Sock aide  [] Wheelchair  [] motorized wc/scooter  [] Wheelchair cushion   [] Crutches [] Long handle shoehorn  [] Reachers [] Toilet seat elevator [] Rollator  [] Walker(wheeled)   [] Walker(standard) [] Mechanical lift    [x] None of the above     Has patient had 2 or more falls in the past year or any fall with injury in the past year? [] yes   [x] no   []unknown    Has patient had major surgery during past 100 days prior to admission?    [] yes   [x] no Type/ Date:     Surgical History:  Past Surgical History:   Procedure Laterality Date    TUBAL LIGATION  1983       Past Medical:  Past Medical History:   Diagnosis Date    Acute gastroenteritis 03/2010    Cerebral artery occlusion with cerebral infarction [facility])     Chronic back pain     Headache(784.0)     Hyperlipidemia 03/2010    Hypertension     Obesity     Osteoarthritis     Tobacco abuse        Current Co-morbidities:  [] Alzheimer's   [] Dysphasia [] Parkinsonism  [] Amputation   [] GERD  [] Peripheral artery disease   [] Anemia      [] Encephalopathy  [] Peripheral vascular disease  [] Anxiety   [] Gangrene   [] Pneumonia  [] Aphasia   [] Gout    [] Polyneuropathy  [] Asthma   [] Heart Failure (diastolic) [] Post-polio syndrome  [] Atrial fibrillation  [] Heart Failure (left-sided) [] Pseudomonas enteritis   [] Blind   [] Heart Failure (right-sided) [] Pulmonary embolism  [] Cellulitis     [] Heart Failure (systolic) [] Renal dialysis  [] Clostridium difficile  [x] Hemiparesis   [] Renal failure  [] Congestive heart failure [x] Hypertension   [] Rheumatoid arthritis  [] COPD   [] Hypotension   [] Seizure disorder   [] Coronary Artery Disease [] Hypothyroidism  [] Septicemia   [] Deaf   [x] Hyperlipidemia   [] Sleep apnea  [] Depression   [x] Morbid obesity  [] Spinal cord injury  [] Diabetes   [] MRSA   [x] Stroke  [] Diabetic nephropathy [] Myocardial infarction  [] Tracheostomy  [] Diabetic neuropathy [] Osteoarthritis  [] Traumatic brain injury   [] Diabetic retinopathy [] Osteoporosis   [] Urinary tract infection  [] DVT   [] Pancytopenia  [] Vocal cord paralysis  []  Spinal stenosis  []  kidney disease [] VRE  [] Post op    []    []        Medical/Functional Conditions requiring inpatient rehabilitation: Requires multidisciplinary treatment including PM&R physician daily care, 24 hour rehabilitation nursing, physical therapy, occupational therapy, rehabilitation psychology, recreation therapy and rehabilitation social work, nutrition services due to new deficits status post CVA    Risk for Medical/Clinical Complications: Falls, injury, pain, skin breakdown, abnormal vitals, abnormal labs, DVT, PE, pneumonia, decreased mobility, neuro changes     CLINICAL DATA:     Height : 5'2     Weight:  231   BMI: 42.07       Date: 4/29/21 Date: 4/30/21 Date: 5/3/21   temperature 97.8 97.7 97.0   pulse 84 75 86   respirations 14 16 16   Blood pressure 92/65 127/70 137/63   Pulse oximeter 98% room air 97% room air 97% room air      ALLERGIES: Patient has no known allergies.     DIET : DIET CARB CONTROL; Carb Control: 4 carb choices (60 gms)/meal    Current Lab and Diagnostic Tests:   Recent Results (from the past 24 hour(s))   Comprehensive Metabolic Panel w/ Reflex to MG    Collection Time: 05/03/21  5:51 AM   Result Value Ref Range    Sodium 135 132 - 146 mmol/L    Potassium reflex Magnesium 3.9 3.5 - 5.0 mmol/L    Chloride 101 98 - 107 mmol/L    CO2 24 22 - 29 mmol/L    Anion Gap 10 7 - 16 mmol/L    Glucose 94 74 - 99 mg/dL    BUN 16 6 - 23 mg/dL    CREATININE 0.8 0.5 - 1.0 mg/dL    GFR Non-African American >60 >=60 mL/min/1.73    GFR African American >60     Calcium 9.0 8.6 - 10.2 mg/dL    Total Protein 6.5 6.4 - 8.3 g/dL    Albumin 3.4 (L) 3.5 - 5.2 g/dL    Total Bilirubin 0.2 0.0 - 1.2 mg/dL    Alkaline Phosphatase 84 35 - 104 U/L    ALT 5 0 - 32 U/L    AST 10 0 - 31 U/L   CBC WITH AUTO DIFFERENTIAL    Collection Time: 05/03/21  5:51 AM   Result Value Ref Range    WBC 8.3 4.5 - 11.5 E9/L    RBC 4.06 3.50 - 5.50 E12/L    Hemoglobin 8.6 (L) 11.5 - 15.5 g/dL    Hematocrit 29.3 (L) 34.0 - 48.0 %    MCV 72.2 (L) 80.0 - 99.9 fL    MCH 21.2 (L) 26.0 - 35.0 pg    MCHC 29.4 (L) 32.0 - 34.5 %    RDW 20.4 (H) 11.5 - 15.0 fL    Platelets 604 058 - 811 E9/L    MPV 8.9 7.0 - 12.0 fL    Neutrophils % 66.6 43.0 - 80.0 %    Immature Granulocytes % 0.4 0.0 - 5.0 %    Lymphocytes % 21.7 20.0 - 42.0 %    Monocytes % 6.6 2.0 - 12.0 %    Eosinophils % 4.2 0.0 - 6.0 %    Basophils % 0.5 0.0 - 2.0 %    Neutrophils Absolute 5.52 1.80 - 7.30 E9/L    Immature Granulocytes # 0.03 E9/L    Lymphocytes Absolute 1.80 1.50 - 4.00 E9/L    Monocytes Absolute 0.55 0.10 - 0.95 E9/L    Eosinophils Absolute 0.35 0.05 - 0.50 E9/L    Basophils Absolute 0.04 0.00 - 0.20 E9/L    Anisocytosis 2+     Polychromasia 2+     Poikilocytes 2+     Schistocytes 1+     Alfredo Cells 2+     Ovalocytes 2+      Ct Head Wo Contrast  Result Date: 4/26/2021  1. No evidence for acute intracranial hemorrhage, territorial infarction or intracranial mass lesion. However considering the extensive amount of white matter changes and extensive prior areas of infarction, brain MRI is more sensitive for detection of subtle foci of acute infarction. 2.  Chronic encephalomalacia involving right medial occipital lobe and anterior aspect of the right superior frontal gyrus and chronic encephalomalacia of bilateral corona radiata and bilateral thalamus and bilateral basal ganglia. 3.  Moderate chronic microangiopathic ischemic disease. Mild generalized volume loss. Xr Chest Portable  Result Date: 4/27/2021  No acute process. Interstitial prominence, likely chronic. Cta Neck W Contrast  Result Date: 4/26/2021  1. Mild atherosclerotic disease . No large vessel occlusion of the 2. Estimated stenosis of the proximal right and left internal carotid artery by NASCET criteria is not hemodynamically significant This study was analyzed by the [employer] algorithm. Ct Brain Perfusion  Result Date: 4/26/2021  There is evidence for core infarct in the right frontal lobe likely old, no acute infarct or ischemia identified This study was analyzed by the [employer] algorithm. Cta Head W Contrast  Result Date: 4/26/2021  1. Mild atherosclerotic disease . No large vessel occlusion of the 2. Estimated stenosis of the proximal right and left internal carotid artery by NASCET criteria is not hemodynamically significant This study was analyzed by the [employer] algorithm.        Additional labs or diagnostic studies needed before admission to rehabilitation unit:  none    Medications:   apixaban  10 mg Oral BID    Followed by    Corrinne Lies ON 5/8/2021] apixaban  5 mg Oral BID    lisinopril  5 mg Oral Daily    clopidogrel  75 mg Oral Daily    sodium chloride flush  5-40 mL Intravenous 2 times per day    atorvastatin  40 mg Oral Nightly      sodium chloride       sodium chloride flush, sodium chloride, promethazine **OR** ondansetron, polyethylene glycol, perflutren lipid microspheres    SPECIAL PRECAUTIONS: [x] No current precautions  [] Cardiac  [] Renal [] Sternal [] Respiratory      [] Neurological           [] Hip  [] Spinal [] Seizure  [] Aspiration  [] Isolation precautions:    [] Contact   [] Respiratory   [] Protective     [] Droplet    [x] Weight Bearing precautions:         [] Non Weight Bearing :         [] Toe Touch Weight Bearing :        [] Partial Weight Bearing :         [x] Weight Bearing as Tolerated :         [x] Fall Risk:   [] Recent history of falls [x] Falls risk level (Mendieta Scale): high      [] Bed Alarm    [] Do not leave alone in the bathroom    [] Chair Alarm    [x] Cognitive impairment      [] One to One supervision  [] Sitter / Tele sitter   [] Safety enclosure bed  [x] Decreased balance     SPECIAL REHABILITATION NEEDS:   [x] IV Therapy: [x] PRN Adapter  [] Midline  [] PICC      [] Central Line    [] TPN       [] Oxygen: [] Trach [] Bi-PAP [] CPAP  [] Nasal cannula  [] Liters:      [] Wound Care:   [] Pressure ulcers(stage and location) -    [] Wound vac   [] Wound or incision care    [] Pain Management (level of pain, meds):      [] Incontinence Bladder [] Ramirez  Insertion date  []Hemodialysis and  Frequency:   [] Incontinence Bowel    [x] Last bowel movement : 5/3/21    Substance use history: [] Yes  [] No   [] Tobacco  [] Alcohol  [] Other     [] Ethnic  [] Cultural  [] Spiritual  [] Language [] Needs  [] Other than English  [] Hearing Impaired  [] Visually Impaired  [] Speaking Impaired  [] Blind  [] Special equipment:  [] Devices/Splints  [] Type   [] Brace   [] Type  [] Bariatric bed  [] Extra wide commode  [] Extra wide wheelchair [] Extra wide walker  [] Tejas walker  [] Tejas wheelchair  [] Transfer lift    [] Other equipment     FUNCTIONAL STATUS PT / [place] / Parisa Hernández:  FIM / EVAL Discipline Initial: 4/28/21 Follow Up: 4/29 Henry Smiley   [] Assist    [] Oxygen        [] Hospital Bed  [] Assisted Living    [] Ramp        [x] To Be Determined    Anticipated Home Health Services:  Anticipated Outpatient Services:  [] PT       [] PT  [] OT      [] OT  [] Speech     [] Speech  [] Nursing     [] Dialysis  [] Aide      [x] To Be Determined  [x] To Be Determined    Anticipated support group:  [] Amputation  [] Multiple Sclerosis  [x] Stroke  [] Brain Injury  [] Spinal cord injury  [] Other     Barriers to discharge: impaired self care, impaired mobility    Discharge Support: [] Patient lives alone and does not have a caregiver available     [x] Patient has a caregiver available     [] Discharge plan has been verified with patient's caregiver      [] Caregiver is in agreement with the discharge plan     Expected functional status for safe discharge: modified independent    Patient/support person goals: go home    Expected length of stay: 1-2 weeks    Discussed expected length of stay and agreeable to IRF plan: [x] Yes   [] No    Impairment Group Category: 1.1    Etiological Diagnosis: CVA    Primary Rehabilitation Diagnosis: CVA    Electronically signed by Viral East RN on 5/3/2021 at 10:30 AM    Prescreen completed __________________________________ (signature of prescreener)    Date:    Time:      JUSTIFICATION FOR ADMISSION TO ACUTE REHABILITATION:  Patient has suffered decline in functional abilities for gait, transfers, speech, swallowing, cognition,  ADL's and IADL's as well as endurance. Patient has functional deficits requiring intensive therapy across multiple disciplines in order to return home safely. Patient will need physician oversight for respiratory issues, abnormal vital signs, nutritional and hydration status, safety issues, medications and therapy modalities. PT, OT and speech will work on deficits as noted in evaluations.  Case management and social work will provide services for DME and management of a safe discharge home.        RECOMMEND LEVEL OF CARE  Recommend inpatient rehabilitation: [] Yes   [] No  If no indicate reason:  [] Functional level too high  [] Unmotivated  [x] No insurance carrier approval [] Unlikely to return to community  [] No medical necessity  [] Patient or family chose other facility  [] Too medically complex  [] Inadequate discharge plan  [] Rehabilitation bed unavailable [] Functional level too low  [] patient or family refused ARU    If patient not accepted for IRF admission, recommended level of care:  [] [address]  [] [address]  [] [place]   [] Home Care  [] Other      [] LTAC       Physician Assigned:  [x] Dr. Mannie Hollis         [] Dr. Lisa Gonzalez              [] Dr. Laura De La Vega [] Dr. Ester Ambriz  [] Dr. Hal Reddy (if not admitted within 48 hours of initial pre-screen)    Medical Update/Changes: Prescreen updated to reflect current data since initiated. Functional Update/Changes: Therapy notes updated on prescreen graph to reflect current status.     Reviewer Signature:_____________________________________    Date:   Time:     PHYSICIAN ADMISSION DETERMINATION AND REVIEW UPDATE:     ____________________________________________________________________  ____________________________________________________________________  ____________________________________________________________________  ____________________________________________________________________  ____________________________________________________________________    Physician Signature:_____________________________________    Print Signature:_________________________________________    Date:     Time:

## 2021-04-29 NOTE — PROGRESS NOTES
Per Dr Deven Terry, patient appropriate for ARU. Will accept pending medical stability, completed testing, negative covid, precert approval, and bed availability.

## 2021-04-29 NOTE — PROGRESS NOTES
Israel Reed is a 73 y.o. right handed female     Patient admitted with altered mental status  Son at bedside stated she had a stroke in 2017 which she refused to be admitted for and walked out after her s/s resolved. In 2017, she was unable to speak and had right sided weakness.  Her s/s completely resolved and she left the hospital.   She was following at the  clinic here but appears she has not had an appt since 2012     She was admitted with confusion but no focal neuro deficits   MRI Brain was obtained which did demonstrate and acute stroke on DWI   Unfortunately, DWI were the only images we were able to obtain d/t her lack of cooperation     Son not present this am   She denies any further \"foggy\" feeling   Tele-sitter remains present     No focal arm or leg weakness    Yesterday therapy noted some judy-neglect     No pain reported  No vertigo reported to me     No chest pain or palpitations  No SOB  No falls, tripping or stumbling  No incontinence of bowels or bladder  No itching or bruising appreciated    ROS otherwise negative     Allergies as of 04/26/2021    (No Known Allergies)     Objective:     BP (!) 150/57   Pulse 75   Temp 98.2 °F (36.8 °C) (Temporal)   Resp 18   SpO2 95%      General appearance: alert, appears stated age and cooperative  Head: Normocephalic, without obvious abnormality, atraumatic  Extremities: no cyanosis or edema  Pulses: 2+ and symmetric  Skin: no rashes or lesions    Mental Status: Alert, oriented to person and place     Speech: clear  Language: appropriate - less laconic today     No anomia  No trouble following commands     Cranial Nerves:  I: smell    II: visual acuity     II: visual fields Full   II: pupils MASHA   III,VII: ptosis None   III,IV,VI: extraocular muscles  EOMI without nystagmus    V: mastication Normal   V: facial light touch sensation  Normal   V,VII: corneal reflex  Present   VII: facial muscle function - upper     VII: facial muscle function - lower Normal   VIII: hearing Normal   IX: soft palate elevation  Normal   IX,X: gag reflex    XI: trapezius strength  5/5   XI: sternocleidomastoid strength 5/5   XI: neck extension strength  5/5   XII: tongue strength  Normal     Motor:  5/5 throughout   Moves right arm and leg appropriately when prompted   Normal bulk and tone    Sensory:  Normal to LT   Vibration normal in the ankles   No 2pt discrimination this am     Coordination:   FN, FFM and SPENCER symmetrical    DTR:   No reflexes    No Aleman's     Laboratory/Radiology:     CBC with Differential:    Lab Results   Component Value Date    WBC 6.1 04/29/2021    RBC 4.18 04/29/2021    HGB 8.8 04/29/2021    HCT 29.6 04/29/2021     04/29/2021    MCV 70.8 04/29/2021    MCH 21.1 04/29/2021    MCHC 29.7 04/29/2021    RDW 19.8 04/29/2021    NRBC 0.9 04/26/2021    LYMPHOPCT 17.5 04/29/2021    MONOPCT 6.5 04/29/2021    BASOPCT 0.7 04/29/2021    MONOSABS 0.40 04/29/2021    LYMPHSABS 1.07 04/29/2021    EOSABS 0.19 04/29/2021    BASOSABS 0.04 04/29/2021     CMP:    Lab Results   Component Value Date     04/29/2021    K 3.6 04/29/2021     04/29/2021    CO2 25 04/29/2021    BUN 8 04/29/2021    CREATININE 0.8 04/29/2021    GFRAA >60 04/29/2021    LABGLOM >60 04/29/2021    GLUCOSE 107 04/29/2021    PROT 6.5 04/29/2021    LABALBU 3.2 04/29/2021    CALCIUM 8.5 04/29/2021    BILITOT 0.3 04/29/2021    ALKPHOS 83 04/29/2021    AST 10 04/29/2021    ALT 5 04/29/2021     HgBA1c:    Lab Results   Component Value Date    LABA1C 5.7 04/27/2021     FLP:    Lab Results   Component Value Date    TRIG 125 04/27/2021    HDL 30 04/27/2021    LDLCALC 101 04/27/2021    LABVLDL 25 04/27/2021       MRI Brain: - DWI only   Findings consistent with acute infarct in the right middle cerebral artery  territory involving the right parietal lobe, posterior temporal lobe and  insular cortex. CTA:  1.  Mild atherosclerotic disease . No large vessel occlusion of the  2. Estimated stenosis of the proximal right and left internal carotid  artery by NASCET criteria is not hemodynamically significant    I personally reviewed the patient's lab and imaging studies at this time.     Assessment:     Large right posterior MCA ischemic infarct most likely causing confusion from her non-dominant hemispheric involvement    Her stroke does appear embolic and with her event in 2017, she will need further cardiac investigation prior to discharge     tobacco abuse     Plan:     Echo still pending will order ADAM   event monitoring will be needed as well     Continue DAPT  Continue statin     Will need stroke clinic on discharge     José Miguel [place]  9:43 AM  4/29/2021

## 2021-04-29 NOTE — PROGRESS NOTES
following  - F/U EEG  - F/U TTE with bubble  - Obtain ADAM   - Cont with tele, event monitor as outpatient  - Cont on DAPT (ASA and Plavix), High intensity statin,   - Aspiration, Fall and Seizure precautions  - SLP  - Stop IVF, encourage PO hydration. Renal function WNL.     DISPOSITION: Intermediate    Medications:  REVIEWED DAILY    Infusion Medications    sodium chloride       Scheduled Medications    lisinopril  5 mg Oral Daily    clopidogrel  75 mg Oral Daily    sodium chloride flush  5-40 mL Intravenous 2 times per day    aspirin  81 mg Oral Daily    Or    aspirin  300 mg Rectal Daily    enoxaparin  40 mg Subcutaneous Daily    atorvastatin  40 mg Oral Nightly     PRN Meds: sodium chloride flush, sodium chloride, promethazine **OR** ondansetron, polyethylene glycol, perflutren lipid microspheres    Labs:     Recent Labs     04/27/21  0737 04/28/21 1757 04/29/21  0735   WBC 7.2 6.3 6.1   HGB 7.9* 8.3* 8.8*   HCT 27.7* 28.6* 29.6*    245 259       Recent Labs     04/28/21  0809 04/28/21  1757 04/29/21  0735    134 135   K 3.6 3.7 3.6    101 101   CO2 23 23 25   BUN 9 10 8   CREATININE 0.7 0.8 0.8   CALCIUM 8.5* 8.6 8.5*       Recent Labs     04/26/21  2258 04/28/21  1757 04/29/21  0735   PROT 7.6 6.6 6.5   ALKPHOS 102 87 83   ALT 6 5 5   AST 9 8 10   BILITOT 0.5 0.3 0.3   LIPASE 28  --   --        Recent Labs     04/26/21 2258   INR 1.2       Recent Labs     04/27/21  1603 04/27/21  2303 04/28/21  0808   TROPONINI <0.01 <0.01 <0.01       Chronic labs:    Lab Results   Component Value Date    CHOL 156 04/27/2021    TRIG 125 04/27/2021    HDL 30 04/27/2021    LDLCALC 101 (H) 04/27/2021    TSH 0.354 04/27/2021    INR 1.2 04/26/2021    LABA1C 5.7 (H) 04/27/2021       Radiology: REVIEWED DAILY    +++++++++++++++++++++++++++++++++++++++++++++++++  Beard Aravind  [employer] - [address], [place]  +++++++++++++++++++++++++++++++++++++++++++++++++  NOTE: This report was transcribed using voice recognition software. Every effort was made to ensure accuracy; however, inadvertent computerized transcription errors may be present.

## 2021-04-29 NOTE — CONSULTS
PM&R Consult Note  Patient: Chad Banda  Age/sex: 73 y.o. female  Medical Record #: [id number]      Referring physician: Hira Liriano MD  Consulting physician: Dr. Jaci Cazares MD  Primary care provider: Zohreh Dominique MD (Inactive)        Chief complaint:   Impairments and acitivities limitations in ADLs, and mobility secondary to acute CVA    HPI:   Chad Banda is a 73 y.o. female with history of prior CVA in 2017 with no residual deficits, who presented to [facility] on 4/26/2021 due to altered mental status and slurred speech. Per charting she became unresponsive while sitting on the couch and began to have slurred speech. In the ED she was nonverbal. Brain MRI demonstrated an acute right MCA CVA. She denies weakness, numbness, difficulty with speech or swallowing. She denies vision change. Her stroke appears embolic. ADAM is pending and Neurology recommends event monitoring. She has participated in therapy evaluations.         Past Medical History:   Diagnosis Date    Acute gastroenteritis 03/2010    Cerebral artery occlusion with cerebral infarction ([facility] [address]     Chronic back pain     Headache(784.0)     Hyperlipidemia 03/2010    Hypertension     Obesity     Osteoarthritis     Tobacco abuse      Past Surgical History:   Procedure Laterality Date    TUBAL LIGATION  1983       Family History   Problem Relation Age of Onset    Diabetes Sister     High Blood Pressure Sister     Cancer Sister         Thryoid    Diabetes Sister     Heart Disease Sister 28        Heart Failure    Cancer Brother     Diabetes Brother     Heart Disease Brother     High Blood Pressure Brother     High Cholesterol Brother        No Known Allergies    Scheduled Meds:  lisinopril, 5 mg, Daily  clopidogrel, 75 mg, Daily  sodium chloride flush, 5-40 mL, 2 times per day  aspirin, 81 mg, Daily    Or  aspirin, 300 mg, Daily  enoxaparin, 40 mg, Daily  atorvastatin, 40 mg, Nightly        PRN Meds  sodium chloride flush, 5-40 mL, PRN  sodium chloride, 25 mL, PRN  promethazine, 12.5 mg, Q6H PRN    Or  ondansetron, 4 mg, Q6H PRN  polyethylene glycol, 17 g, Daily PRN  perflutren lipid microspheres, 1.5 mL, ONCE PRN        Social History:  Social History     Socioeconomic History    Marital status:      Spouse name: Not on file    Number of children: Not on file    Years of education: Not on file    Highest education level: Not on file   Occupational History    Not on file   Social Needs    Financial resource strain: Not on file    Food insecurity     Worry: Not on file     Inability: Not on file    Transportation needs     Medical: Not on file     Non-medical: Not on file   Tobacco Use    Smoking status: Current Every Day Smoker     Packs/day: 0.80     Years: 38.00     Pack years: 30.40     Types: Cigarettes    Smokeless tobacco: Never Used    Tobacco comment: Wants to quit on her own first   Substance and Sexual Activity    Alcohol use: No    Drug use: No    Sexual activity: Never   Lifestyle    Physical activity     Days per week: Not on file     Minutes per session: Not on file    Stress: Not on file   Relationships    Social connections     Talks on phone: Not on file     Gets together: Not on file     Attends Restoration service: Not on file     Active member of club or organization: Not on file     Attends meetings of clubs or organizations: Not on file     Relationship status: Not on file    Intimate partner violence     Fear of current or ex partner: Not on file     Emotionally abused: Not on file     Physically abused: Not on file     Forced sexual activity: Not on file   Other Topics Concern    Not on file   Social History Narrative    Not on file       Home situation: Live with son in 2 level home with 4 steps to enter and 1 rail.  Bed/bath on 2nd floor      Functional History:  Premorbid ADL's: independent   Premorbid Mobility: independent   Present: decrease in mobility and function     Physical Therapy:  Bed mobility: SBA  Transfers: Mod A  Ambulation: 50 ft [employer] Mod A    Occupational Therapy:  Feeding: Supervision  Grooming: Min A / SBA  UB dressing: Min A  LB dressing: Mod A      Review Of Systems:   Constitutional: No Fever, chills  HEENT: No HA, blurry vision  Respiratory: No Cough, SOB  Cardiovascular: No CP  Gastrointestinal: No nausea, vomiting, diarrhea, abdominal discomfort  Genitourinary: No Dysuria  Musculoskeletal:  per HPI  Neurologic: per HPI  Psychiatric: No Depression, No anxiety      Physical Examination:  Vitals:   Vitals:    04/29/21 0010 04/29/21 0453 04/29/21 0800 04/29/21 1100   BP: (!) 125/91 (!) 150/57 123/61 128/64   Pulse: 70 75 68 70   Resp: 18 18 14 16   Temp: 98.2 °F (36.8 °C) 98.2 °F (36.8 °C) 98.1 °F (36.7 °C) 97.9 °F (36.6 °C)   TempSrc: Temporal Temporal Temporal    SpO2: 95%  94% 95%       GEN: NAD, resting in bed   HEENT: NC/AT, PERRL, EOMI  RESP: CTAB, no R/R/W  CV: +S1 S2, RRR  ABD: soft, NT, ND, normal BS   EXT: No erythema/clubbing/cyanosis, 2+ pulses  Skin: no rash    Neuro Exam:  Alert, oriented to person, place, month/year  Speech clear, content appropriate  Follows commands, difficulty with complex commands   There is some degree of left inattention   Latent responses noted   Recall 0/3 at 5 min    Cranial Nerves:  I: olfactory not tested  II visual fields intact to confrontation   III, IV, VI: extra occular muscles intact  Pupils (II, III): ERRL  V: Facial Sensation/Jaw clench: intact  VII: Facial Motor: intact  VIII. Hearing: intact  XI/X: Palate: intact  XI: Shoulder shrug/SCM:intact  XII: Tongue: intact      Coordination:  SPENCER's: intact    Sensory:    LT: intact throughout       Motor:   Tone was normal    Strength is 5/5 throughout      DTRs:  No reflexes    No Babinski  No Aleman      Laboratory:    Lab Results   Component Value Date    WBC 6.1 04/29/2021    HGB 8.8 (L) 04/29/2021    HCT 29.6 (L) 04/29/2021    MCV 70.8 (L) 04/29/2021     04/29/2021 Lab Results   Component Value Date     04/29/2021    K 3.6 04/29/2021     04/29/2021    CO2 25 04/29/2021    BUN 8 04/29/2021    CREATININE 0.8 04/29/2021    GLUCOSE 107 04/29/2021    CALCIUM 8.5 04/29/2021      Lab Results   Component Value Date    ALT 5 04/29/2021    AST 10 04/29/2021    ALKPHOS 83 04/29/2021    BILITOT 0.3 04/29/2021       Lab Results   Component Value Date    COLORU Yellow 04/27/2021    NITRU Negative 04/27/2021    GLUCOSEU Negative 04/27/2021    KETUA Negative 04/27/2021    UROBILINOGEN 4.0 04/27/2021    BILIRUBINUR Negative 04/27/2021     Lab Results   Component Value Date    LABA1C 5.7 (H) 04/27/2021         Pertinent Imaging:  Brain MRI   FINDINGS:   The patient was uncooperative and only axial diffusion-weighted images could   be done.  These images are also degraded by motion artifact. Rickford Math is a   large area of restricted diffusion, consistent with acute ischemia, in the   right parietal lobe and also involving the posterior temporal lobe and   posterior insular cortex. Rickford Math is an old right frontal infarct.       INTRACRANIAL STRUCTURES/VENTRICLES: There is no acute infarct. No mass effect   or midline shift. No evidence of an acute intracranial hemorrhage.  The   ventricles and sulci are normal in size and configuration.  The   sellar/suprasellar regions appear unremarkable.  The normal signal voids   within the major intracranial vessels appear maintained.           Impression   Findings consistent with acute infarct in the right middle cerebral artery   territory involving the right parietal lobe, posterior temporal lobe and   insular cortex.          IMPRESSION:  Jenny Hurley is a 73 y.o. female with impairments and acitivities limitations in ADLs and mobility secondary to acute CVA    Demonstrating impaired strength, impaired ROM, impaired balance, impaired safety awareness, cognitive impairment, decreased endurance, impaired functional mobility and impaired self care.      Recommendations:   Patient is appropriate and would benefit from Acute Rehabilitation to improve functional outcomes. Will accept to ARU once medical workup is complete. She will require precert. Thank you for the consult.     Jaci Cazares MD  Physical Medicine and Rehabilitation

## 2021-04-30 ENCOUNTER — ANESTHESIA (OUTPATIENT)
Dept: CARDIAC CATH/INVASIVE PROCEDURES | Age: 66
DRG: 065 | End: 2021-04-30
Payer: MEDICARE

## 2021-04-30 ENCOUNTER — ANESTHESIA EVENT (OUTPATIENT)
Dept: CARDIAC CATH/INVASIVE PROCEDURES | Age: 66
DRG: 065 | End: 2021-04-30
Payer: MEDICARE

## 2021-04-30 VITALS
RESPIRATION RATE: 16 BRPM | OXYGEN SATURATION: 98 % | SYSTOLIC BLOOD PRESSURE: 108 MMHG | DIASTOLIC BLOOD PRESSURE: 84 MMHG

## 2021-04-30 LAB
ALBUMIN SERPL-MCNC: 3 G/DL (ref 3.5–5.2)
ALP BLD-CCNC: 79 U/L (ref 35–104)
ALT SERPL-CCNC: 5 U/L (ref 0–32)
ANION GAP SERPL CALCULATED.3IONS-SCNC: 11 MMOL/L (ref 7–16)
AST SERPL-CCNC: 10 U/L (ref 0–31)
BASOPHILS ABSOLUTE: 0.04 E9/L (ref 0–0.2)
BASOPHILS RELATIVE PERCENT: 0.6 % (ref 0–2)
BILIRUB SERPL-MCNC: 0.4 MG/DL (ref 0–1.2)
BUN BLDV-MCNC: 11 MG/DL (ref 6–23)
CALCIUM SERPL-MCNC: 8.6 MG/DL (ref 8.6–10.2)
CHLORIDE BLD-SCNC: 103 MMOL/L (ref 98–107)
CO2: 23 MMOL/L (ref 22–29)
CREAT SERPL-MCNC: 0.8 MG/DL (ref 0.5–1)
EOSINOPHILS ABSOLUTE: 0.26 E9/L (ref 0.05–0.5)
EOSINOPHILS RELATIVE PERCENT: 3.9 % (ref 0–6)
GFR AFRICAN AMERICAN: >60
GFR NON-AFRICAN AMERICAN: >60 ML/MIN/1.73
GLUCOSE BLD-MCNC: 108 MG/DL (ref 74–99)
HCT VFR BLD CALC: 29.1 % (ref 34–48)
HEMOGLOBIN: 8.7 G/DL (ref 11.5–15.5)
IMMATURE GRANULOCYTES #: 0.02 E9/L
IMMATURE GRANULOCYTES %: 0.3 % (ref 0–5)
LV EF: 63 %
LVEF MODALITY: NORMAL
LYMPHOCYTES ABSOLUTE: 1.19 E9/L (ref 1.5–4)
LYMPHOCYTES RELATIVE PERCENT: 17.7 % (ref 20–42)
MCH RBC QN AUTO: 21.1 PG (ref 26–35)
MCHC RBC AUTO-ENTMCNC: 29.9 % (ref 32–34.5)
MCV RBC AUTO: 70.5 FL (ref 80–99.9)
MONOCYTES ABSOLUTE: 0.47 E9/L (ref 0.1–0.95)
MONOCYTES RELATIVE PERCENT: 7 % (ref 2–12)
NEUTROPHILS ABSOLUTE: 4.73 E9/L (ref 1.8–7.3)
NEUTROPHILS RELATIVE PERCENT: 70.5 % (ref 43–80)
PDW BLD-RTO: 19.7 FL (ref 11.5–15)
PLATELET # BLD: 257 E9/L (ref 130–450)
PMV BLD AUTO: 8.5 FL (ref 7–12)
POTASSIUM REFLEX MAGNESIUM: 3.6 MMOL/L (ref 3.5–5)
RBC # BLD: 4.13 E12/L (ref 3.5–5.5)
SODIUM BLD-SCNC: 137 MMOL/L (ref 132–146)
TOTAL PROTEIN: 6.4 G/DL (ref 6.4–8.3)
WBC # BLD: 6.7 E9/L (ref 4.5–11.5)

## 2021-04-30 PROCEDURE — 80053 COMPREHEN METABOLIC PANEL: CPT

## 2021-04-30 PROCEDURE — 36415 COLL VENOUS BLD VENIPUNCTURE: CPT

## 2021-04-30 PROCEDURE — 97530 THERAPEUTIC ACTIVITIES: CPT

## 2021-04-30 PROCEDURE — 97535 SELF CARE MNGMENT TRAINING: CPT

## 2021-04-30 PROCEDURE — 6360000002 HC RX W HCPCS: Performed by: NURSE ANESTHETIST, CERTIFIED REGISTERED

## 2021-04-30 PROCEDURE — 6370000000 HC RX 637 (ALT 250 FOR IP): Performed by: STUDENT IN AN ORGANIZED HEALTH CARE EDUCATION/TRAINING PROGRAM

## 2021-04-30 PROCEDURE — 6370000000 HC RX 637 (ALT 250 FOR IP): Performed by: CLINICAL NURSE SPECIALIST

## 2021-04-30 PROCEDURE — 6360000002 HC RX W HCPCS: Performed by: FAMILY MEDICINE

## 2021-04-30 PROCEDURE — 93325 DOPPLER ECHO COLOR FLOW MAPG: CPT

## 2021-04-30 PROCEDURE — 93312 ECHO TRANSESOPHAGEAL: CPT

## 2021-04-30 PROCEDURE — 93325 DOPPLER ECHO COLOR FLOW MAPG: CPT | Performed by: INTERNAL MEDICINE

## 2021-04-30 PROCEDURE — 2709999900 HC NON-CHARGEABLE SUPPLY

## 2021-04-30 PROCEDURE — 6370000000 HC RX 637 (ALT 250 FOR IP): Performed by: FAMILY MEDICINE

## 2021-04-30 PROCEDURE — 85025 COMPLETE CBC W/AUTO DIFF WBC: CPT

## 2021-04-30 PROCEDURE — B24BZZ4 ULTRASONOGRAPHY OF HEART WITH AORTA, TRANSESOPHAGEAL: ICD-10-PCS | Performed by: INTERNAL MEDICINE

## 2021-04-30 PROCEDURE — 2060000000 HC ICU INTERMEDIATE R&B

## 2021-04-30 PROCEDURE — 93312 ECHO TRANSESOPHAGEAL: CPT | Performed by: INTERNAL MEDICINE

## 2021-04-30 PROCEDURE — 2700000000 HC OXYGEN THERAPY PER DAY

## 2021-04-30 PROCEDURE — 2580000003 HC RX 258: Performed by: NURSE ANESTHETIST, CERTIFIED REGISTERED

## 2021-04-30 PROCEDURE — 99232 SBSQ HOSP IP/OBS MODERATE 35: CPT | Performed by: CLINICAL NURSE SPECIALIST

## 2021-04-30 PROCEDURE — 2580000003 HC RX 258: Performed by: FAMILY MEDICINE

## 2021-04-30 PROCEDURE — 93321 DOPPLER ECHO F-UP/LMTD STD: CPT

## 2021-04-30 RX ORDER — PROPOFOL 10 MG/ML
INJECTION, EMULSION INTRAVENOUS PRN
Status: DISCONTINUED | OUTPATIENT
Start: 2021-04-30 | End: 2021-04-30 | Stop reason: SDUPTHER

## 2021-04-30 RX ORDER — SODIUM CHLORIDE 9 MG/ML
INJECTION, SOLUTION INTRAVENOUS CONTINUOUS PRN
Status: DISCONTINUED | OUTPATIENT
Start: 2021-04-30 | End: 2021-04-30 | Stop reason: SDUPTHER

## 2021-04-30 RX ADMIN — Medication 10 ML: at 09:06

## 2021-04-30 RX ADMIN — ATORVASTATIN CALCIUM 40 MG: 40 TABLET, FILM COATED ORAL at 20:21

## 2021-04-30 RX ADMIN — CLOPIDOGREL 75 MG: 75 TABLET, FILM COATED ORAL at 09:06

## 2021-04-30 RX ADMIN — Medication 10 ML: at 20:21

## 2021-04-30 RX ADMIN — ENOXAPARIN SODIUM 40 MG: 40 INJECTION SUBCUTANEOUS at 15:13

## 2021-04-30 RX ADMIN — PROPOFOL 250 MG: 10 INJECTION, EMULSION INTRAVENOUS at 13:55

## 2021-04-30 RX ADMIN — ASPIRIN 81 MG: 81 TABLET, COATED ORAL at 09:06

## 2021-04-30 RX ADMIN — SODIUM CHLORIDE: 9 INJECTION, SOLUTION INTRAVENOUS at 12:59

## 2021-04-30 RX ADMIN — LISINOPRIL 5 MG: 10 TABLET ORAL at 09:06

## 2021-04-30 ASSESSMENT — PAIN SCALES - GENERAL
PAINLEVEL_OUTOF10: 0

## 2021-04-30 ASSESSMENT — LIFESTYLE VARIABLES: SMOKING_STATUS: 1

## 2021-04-30 NOTE — PROGRESS NOTES
donning pony tail sterling, pt then agreeable to stand at sink to brush teeth, cues for location of paper towels & to turn off facet  Modified [place]    UB Dressing Minimal Assist  Min A  Doff/danika gown seated in chair, cues to attend to L side  Modified [place]    LB Dressing Moderate Assist   Simulated pants     Improved to min A to don/doff B socks  Mod A  Simulated pants, Doff/danika socks using cross over technique, improved use of L hand this date  Stand by Assist    Bathing Moderate Assist  UB: Min A  Washing back   LB: Min A  For balance while standing for perlita-care, cues for thoroughness Stand by Assist    Toileting Minimal Assist   Min A  Simulated, lakeisha present  Supervision    Bed Mobility  NT SBA  Cues for hand placement & technique  Rolling: Independent   Supine to sit: Modified [place]   Sit to supine: Modified [place]    Functional Transfers Mod A Min A  Cues for hand placement & technique   SBA   Functional Mobility Mod A w/ w/  Light mobility in room to prep for ADL's  Min A  Short distance HHA in & out of bathroom, cues to attend to L side environment  SBA   Balance Sitting: SBA  Standing: Min><Mod A w/ w/w  Sitting: SBA  Standing: Min A  Hand held assist      Activity Tolerance Fair Fair  Good   Visual/  Perceptual Glasses: no  L inattention noted during functional ambulation and ADL's. Frequent cues to attend to left required                     Education:  Pt was educated through out treatment regarding proper technique & safety with bed mobility, functional transfers & mobility, increasing awareness of L side of body & environment along with ADL compensatory strategies to ease tasks to improve safety, prevent falls and allow pt to return home safely. Comments: Upon arrival pt was in bed & agreeable for therapy. At end of session pt was seated in chair all lines and tubes intact, call light within reach.  Staff informed pt was up in chair, pt able to recall which button to push for assist. Also TSM present. · Pt has made Fair progress towards set goals. · Continue with current plan of care    Treatment Time In: 10:15            Treatment Time Out: 10:50          Treatment Charges: Mins Units   Ther Ex  13199     Manual Therapy 21477     Thera Activities 40721 10 1   ADL/Home Mgt 49832 30 2   Neuro Re-ed 11373     Group Therapy      Orthotic manage/training  41212     Non-Billable Time     Total Timed Treatment 40 3       Barbara CARLSON  [address] [place] [address] Drive, [address] [place] [address]

## 2021-04-30 NOTE — PROGRESS NOTES
Karla Albright is a 73 y.o. right handed female     Patient admitted with altered mental status  Son at bedside stated she had a stroke in 2017 which she refused to be admitted for and walked out after her s/s resolved. In 2017, she was unable to speak and had right sided weakness.  Her s/s completely resolved and she left the hospital.   She was following at the IM clinic here but appears she has not had an appt since 2012     She was admitted with confusion but no focal neuro deficits   MRI Brain was obtained which did demonstrate and acute stroke on DWI   Unfortunately, DWI were the only images we were able to obtain d/t her lack of cooperation     Son not present this am   Tele-sitter remains present     No focal arm or leg weakness    therapy noted some judy-neglect - moderate assist to ambulate   Plan for acute rehab    ADAM to be completed at 1400 today    Event monitoring also planned     No pain reported  No vertigo reported to me     No chest pain or palpitations  No SOB  No falls, tripping or stumbling  No incontinence of bowels or bladder  No itching or bruising appreciated    ROS otherwise negative     Allergies as of 04/26/2021    (No Known Allergies)     Objective:     /70   Pulse 75   Temp 97.7 °F (36.5 °C) (Temporal)   Resp 16   SpO2 97%      General appearance: alert, appears stated age and cooperative  Head: Normocephalic, without obvious abnormality, atraumatic  Extremities: no cyanosis or edema  Pulses: 2+ and symmetric  Skin: no rashes or lesions    Mental Status: Alert, oriented to person and place     Speech: clear  Language: appropriate - less laconic today     No anomia  No trouble following commands     Cranial Nerves:  I: smell    II: visual acuity     II: visual fields Full   II: pupils MSAHA   III,VII: ptosis None   III,IV,VI: extraocular muscles  EOMI without nystagmus    V: mastication Normal   V: facial light touch sensation  Normal   V,VII: corneal reflex  Present   VII: facial muscle function - upper     VII: facial muscle function - lower Normal   VIII: hearing Normal   IX: soft palate elevation  Normal   IX,X: gag reflex    XI: trapezius strength  5/5   XI: sternocleidomastoid strength 5/5   XI: neck extension strength  5/5   XII: tongue strength  Normal     Motor:  5/5 throughout  Normal bulk and tone    Sensory:  Normal to LT   Vibration normal in the ankles   No 2pt discrimination to LT appreciated     Coordination:   FN, FFM and SPENCER symmetrical  HS symmetrical     DTR:   No reflexes    No Aleman's     Laboratory/Radiology:     CBC with Differential:    Lab Results   Component Value Date    WBC 6.7 04/30/2021    RBC 4.13 04/30/2021    HGB 8.7 04/30/2021    HCT 29.1 04/30/2021     04/30/2021    MCV 70.5 04/30/2021    MCH 21.1 04/30/2021    MCHC 29.9 04/30/2021    RDW 19.7 04/30/2021    NRBC 0.9 04/26/2021    LYMPHOPCT 17.7 04/30/2021    MONOPCT 7.0 04/30/2021    BASOPCT 0.6 04/30/2021    MONOSABS 0.47 04/30/2021    LYMPHSABS 1.19 04/30/2021    EOSABS 0.26 04/30/2021    BASOSABS 0.04 04/30/2021     CMP:    Lab Results   Component Value Date     04/30/2021    K 3.6 04/30/2021     04/30/2021    CO2 23 04/30/2021    BUN 11 04/30/2021    CREATININE 0.8 04/30/2021    GFRAA >60 04/30/2021    LABGLOM >60 04/30/2021    GLUCOSE 108 04/30/2021    PROT 6.4 04/30/2021    LABALBU 3.0 04/30/2021    CALCIUM 8.6 04/30/2021    BILITOT 0.4 04/30/2021    ALKPHOS 79 04/30/2021    AST 10 04/30/2021    ALT 5 04/30/2021     HgBA1c:    Lab Results   Component Value Date    LABA1C 5.7 04/27/2021     FLP:    Lab Results   Component Value Date    TRIG 125 04/27/2021    HDL 30 04/27/2021    LDLCALC 101 04/27/2021    LABVLDL 25 04/27/2021       MRI Brain: - DWI only   Findings consistent with acute infarct in the right middle cerebral artery  territory involving the right parietal lobe, posterior temporal lobe and  insular cortex. CTA:  1.  Mild atherosclerotic disease .  No large vessel occlusion of the  2. Estimated stenosis of the proximal right and left internal carotid  artery by NASCET criteria is not hemodynamically significant    I personally reviewed the patient's lab and imaging studies at this time.     Assessment:     Large right posterior MCA ischemic infarct most likely causing confusion from her non-dominant hemispheric involvement    Her stroke does appear embolic and with her event in 2017, she will need further cardiac investigation prior to discharge to rehab     On exam, she does very well ; however when distracted or not concentrating on a task, she demonstrates her judy-neglect     tobacco abuse     Plan:     ADAM to be completed at 1400    event monitoring will be needed as well most likely     Continue DAPT  Continue statin     Transfer to rehab pending     Chely White  9:38 AM  4/30/2021

## 2021-04-30 NOTE — PROCEDURES
PRELIMINARY TRANSESOPHAGEAL ECHOCARDIOGRAPHY REPORT    Date of Procedure: 4/30/2021    Indication:  CVA    Sedation: Propofol    Complications: None    Preliminary findings:  Normal LV function   Normal RV function   No hemodynamically significant valve disease   No intracardiac mass or thrombus  Small PFO noted with an aneurysmal atrial septum  Right to left shunting of few bubbles on agitated saline study    Full report to follow    Lexie Lamar MD, [address] [facility]

## 2021-04-30 NOTE — ANESTHESIA PRE PROCEDURE
Department of Anesthesiology  Preprocedure Note       Name:  Yesy Moffett   Age:  73 y.o.  :  1955                                          MRN:  [id number]         Date:  2021      Surgeon: * Surgery not found *[place]    Procedure: ADAM    Medications prior to admission:   Prior to Admission medications    Medication Sig Start Date End Date Taking?  Authorizing Provider   aspirin 81 MG chewable tablet Take 81 mg by mouth daily   Yes Historical Provider, MD       Current medications:    Current Facility-Administered Medications   Medication Dose Route Frequency Provider Last Rate Last Admin    lisinopril (PRINIVIL;ZESTRIL) tablet 5 mg  5 mg Oral Daily Paul Concepcion MD   5 mg at 21 0906    clopidogrel (PLAVIX) tablet 75 mg  75 mg Oral Daily LEYDA Ga - CNS   75 mg at 21 09    sodium chloride flush 0.9 % injection 5-40 mL  5-40 mL Intravenous 2 times per day Kina Yeung MD   10 mL at 21 09    sodium chloride flush 0.9 % injection 5-40 mL  5-40 mL Intravenous PRN Kina Yeung MD        0.9 % sodium chloride infusion  25 mL Intravenous PRN Kina Yeung MD        promethazine (PHENERGAN) tablet 12.5 mg  12.5 mg Oral Q6H PRN Kina Yeung MD        Or    ondansetron [facility]) injection 4 mg  4 mg Intravenous Q6H PRN Kina Yeung MD        polyethylene glycol [facility]) packet 17 g  17 g Oral Daily PRN Kina Yeung MD        aspirin EC tablet 81 mg  81 mg Oral Daily Kina Yeung MD   81 mg at 21 1788    Or    aspirin suppository 300 mg  300 mg Rectal Daily Kina Yeung MD        enoxaparin (LOVENOX) injection 40 mg  40 mg Subcutaneous Daily Kina Yeung MD   40 mg at 21 1009    perflutren lipid microspheres (DEFINITY) injection 1.65 mg  1.5 mL Intravenous ONCE PRN Kina Yeung MD        atorvastatin (LIPITOR) tablet 40 mg  40 mg Oral Nightly Kina Yeung MD   40 mg at 21 2096       Allergies:  No Known Allergies Problem List:    Patient Active Problem List   Diagnosis Code    Tobacco abuse Z72.0    Hyperlipidemia E78.5    Stroke-like symptoms R29.90    Encephalopathy acute G93.40    Cerebrovascular accident (CVA) due to embolism of right middle cerebral artery ([facility] [address] I63.411       Past Medical History:        Diagnosis Date    Acute gastroenteritis 03/2010    Cerebral artery occlusion with cerebral infarction ([facility] [address]     Chronic back pain     Headache(784.0)     Hyperlipidemia 03/2010    Hypertension     Obesity     Osteoarthritis     Tobacco abuse        Past Surgical History:        Procedure Laterality Date    TUBAL LIGATION  1983       Social History:    Social History     Tobacco Use    Smoking status: Current Every Day Smoker     Packs/day: 0.80     Years: 38.00     Pack years: 30.40     Types: Cigarettes    Smokeless tobacco: Never Used    Tobacco comment: Wants to quit on her own first   Substance Use Topics    Alcohol use:  No                                Ready to quit: Not Answered  Counseling given: Not Answered  Comment: Wants to quit on her own first      Vital Signs (Current):   Vitals:    04/30/21 0450 04/30/21 0815 04/30/21 1118 04/30/21 1130   BP: (!) 111/58 127/70 120/71 111/62   Pulse: 69 75 81 89   Resp: 12 16 18 18   Temp: 36.6 °C (97.8 °F) 36.5 °C (97.7 °F) 36.1 °C (96.9 °F) 36.6 °C (97.8 °F)   TempSrc: Temporal Temporal Temporal Temporal   SpO2: 97% 97% 97% 98%                                              BP Readings from Last 3 Encounters:   04/30/21 111/62   03/04/17 132/68   11/13/12 130/74       NPO Status:  > 8hrs                                                                               BMI:   Wt Readings from Last 3 Encounters:   03/04/17 230 lb (104.3 kg)   11/13/12 233 lb (105.7 kg)   06/12/12 237 lb (107.5 kg)     There is no height or weight on file to calculate BMI.    CBC:   Lab Results   Component Value Date    WBC 6.7 04/30/2021    RBC 4.13 04/30/2021    HGB 8.7 04/30/2021    HCT 29.1 04/30/2021    MCV 70.5 04/30/2021    RDW 19.7 04/30/2021     04/30/2021       CMP:   Lab Results   Component Value Date     04/30/2021    K 3.6 04/30/2021     04/30/2021    CO2 23 04/30/2021    BUN 11 04/30/2021    CREATININE 0.8 04/30/2021    GFRAA >60 04/30/2021    LABGLOM >60 04/30/2021    GLUCOSE 108 04/30/2021    PROT 6.4 04/30/2021    CALCIUM 8.6 04/30/2021    BILITOT 0.4 04/30/2021    ALKPHOS 79 04/30/2021    AST 10 04/30/2021    ALT 5 04/30/2021       POC Tests: No results for input(s): POCGLU, POCNA, POCK, POCCL, POCBUN, POCHEMO, POCHCT in the last 72 hours. Coags:   Lab Results   Component Value Date    PROTIME 12.9 04/26/2021    INR 1.2 04/26/2021    APTT 28.3 04/26/2021       HCG (If Applicable): No results found for: PREGTESTUR, PREGSERUM, HCG, HCGQUANT     ABGs: No results found for: PHART, PO2ART, ZTJ8ZBI, YUD3USE, BEART, S1XCLVSH     Type & Screen (If Applicable):  No results found for: LABABO, LABRH    Drug/Infectious Status (If Applicable):  No results found for: HIV, HEPCAB    COVID-19 Screening (If Applicable):   Lab Results   Component Value Date    COVID19 Not Detected 04/27/2021           Anesthesia Evaluation    Airway: Mallampati: II  TM distance: >3 FB   Neck ROM: full  Mouth opening: > = 3 FB Dental:    (+) upper dentures      Pulmonary:   (+) current smoker          Patient did not smoke on day of surgery. Cardiovascular:    (+) hypertension:,                   Neuro/Psych:   (+) CVA:, headaches:,              ROS comment: CVA in 2017 and again this week. ?? Embolic GI/Hepatic/Renal: Neg GI/Hepatic/Renal ROS            Endo/Other: Negative Endo/Other ROS   (+) blood dyscrasia (lovenox): anemia and anticoagulation therapy:., .                 Abdominal:   (+) obese,         Vascular: negative vascular ROS. Anesthesia Plan      MAC     ASA 3       Induction: intravenous.       Anesthetic plan and risks discussed with patient.                       Estrada Espinal, APRN - CRNA   4/30/2021

## 2021-04-30 NOTE — PLAN OF CARE
Problem: Skin Integrity:  Goal: Will show no infection signs and symptoms  Description: Will show no infection signs and symptoms  Outcome: Met This Shift  Goal: Absence of new skin breakdown  Description: Absence of new skin breakdown  Outcome: Met This Shift     Problem: Falls - Risk of:  Goal: Will remain free from falls  Description: Will remain free from falls  Outcome: Met This Shift  Goal: Absence of physical injury  Description: Absence of physical injury  Outcome: Met This Shift     Problem: Confusion - Acute:  Goal: Absence of continued neurological deterioration signs and symptoms  Description: Absence of continued neurological deterioration signs and symptoms  Outcome: Met This Shift  Goal: Mental status will be restored to baseline  Description: Mental status will be restored to baseline  Outcome: Met This Shift     Problem: Discharge Planning:  Goal: Ability to perform activities of daily living will improve  Description: Ability to perform activities of daily living will improve  Outcome: Ongoing  Goal: Participates in care planning  Description: Participates in care planning  Outcome: Ongoing     Problem: Injury - Risk of, Physical Injury:  Goal: Will remain free from falls  Description: Will remain free from falls  Outcome: Met This Shift  Goal: Absence of physical injury  Description: Absence of physical injury  Outcome: Met This Shift     Problem: Mood - Altered:  Goal: Mood stable  Description: Mood stable  Outcome: Met This Shift  Goal: Absence of abusive behavior  Description: Absence of abusive behavior  Outcome: Met This Shift  Goal: Verbalizations of feeling emotionally comfortable while being cared for will increase  Description: Verbalizations of feeling emotionally comfortable while being cared for will increase  Outcome: Met This Shift     Problem: Psychomotor Activity - Altered:  Goal: Absence of psychomotor disturbance signs and symptoms  Description: Absence of psychomotor disturbance signs and symptoms  Outcome: Met This Shift     Problem: Sensory Perception - Impaired:  Goal: Demonstrations of improved sensory functioning will increase  Description: Demonstrations of improved sensory functioning will increase  Outcome: Met This Shift  Goal: Decrease in sensory misperception frequency  Description: Decrease in sensory misperception frequency  Outcome: Met This Shift  Goal: Able to refrain from responding to false sensory perceptions  Description: Able to refrain from responding to false sensory perceptions  Outcome: Met This Shift  Goal: Demonstrates accurate environmental perceptions  Description: Demonstrates accurate environmental perceptions  Outcome: Met This Shift  Goal: Able to distinguish between reality-based and nonreality-based thinking  Description: Able to distinguish between reality-based and nonreality-based thinking  Outcome: Met This Shift  Goal: Able to interrupt nonreality-based thinking  Description: Able to interrupt nonreality-based thinking  Outcome: Met This Shift     Problem: Sleep Pattern Disturbance:  Goal: Appears well-rested  Description: Appears well-rested  Outcome: Met This Shift

## 2021-04-30 NOTE — PROGRESS NOTES
Hospitalist Progress Note      SYNOPSIS: Patient admitted on 2021 for AMS    SUBJECTIVE:  Patient seen and examined at bedside in NAD. Tolerated ADAM well today. Pt without gross focal deficits. No acute events overnight. Mentation with continued improvement. Records reviewed. Stable overnight. No other overnight issues reported. Temp (24hrs), Av.6 °F (36.4 °C), Min:96.9 °F (36.1 °C), Max:97.9 °F (36.6 °C)    DIET: DIET CARB CONTROL; Carb Control: 4 carb choices (60 gms)/meal  CODE: Full Code    Intake/Output Summary (Last 24 hours) at 2021 1603  Last data filed at 2021 1414  Gross per 24 hour   Intake 310 ml   Output 1375 ml   Net -1065 ml       OBJECTIVE:    /63   Pulse 77   Temp 97.6 °F (36.4 °C) (Temporal)   Resp 18   SpO2 100%     General appearance: No apparent distress, appears stated age and cooperative. HEENT:  Conjunctivae/corneas clear. Neck: Supple. No jugular venous distention. Respiratory: Clear to auscultation bilaterally, normal respiratory effort  Cardiovascular: Regular rate rhythm, normal S1-S2  Abdomen: Soft, nontender, nondistended  Musculoskeletal: No clubbing, cyanosis, no bilateral lower extremity edema. Brisk capillary refill. Skin:  No rashes  on visible skin  Neurologic: awake, alert and following commands     ASSESSMENT:  Acute Encephalopathy  Acute Ischemic CVA  KEE  Lactic Acidosis  Hyperglycemia  Microcytic Anemia     PLAN:  - CTH on admission negative for acute intracranial pathology  - CTA Head and Neck with mild atherosclerotic disease. No large vessel occlusion  - CT brain perfusion with there is evidence for core infarct in the right frontal lobe likely old, no acute infarct or ischemia identified  - MRI brain without contrast showed findings consistent with acute infarct in the right middle cerebral artery territory involving the right parietal lobe, posterior temporal lobe and insular cortex.   - Neurology c/s noted and appreciated; following  - F/U EEG  - Preliminary ADAM report on 04/30/21 with Normal LV function. Normal RV function. No hemodynamically significant valve disease. No intracardiac mass or thrombus. Small PFO noted with an aneurysmal atrial septum. Right to left shunting of few bubbles on agitated saline study. - Cont with tele, event monitor as outpatient  - Cont on DAPT (ASA and Plavix), High intensity statin,   - Aspiration, Fall and Seizure precautions  - SLP  - Stop IVF, encourage PO hydration. Renal function WNL. DISPOSITION: Intermediate. Pt is hemodynamically stable. Awaiting priorauth. Medications:  REVIEWED DAILY    Infusion Medications    sodium chloride       Scheduled Medications    lisinopril  5 mg Oral Daily    clopidogrel  75 mg Oral Daily    sodium chloride flush  5-40 mL Intravenous 2 times per day    aspirin  81 mg Oral Daily    Or    aspirin  300 mg Rectal Daily    enoxaparin  40 mg Subcutaneous Daily    atorvastatin  40 mg Oral Nightly     PRN Meds: sodium chloride flush, sodium chloride, promethazine **OR** ondansetron, polyethylene glycol, perflutren lipid microspheres    Labs:     Recent Labs     04/28/21 1757 04/29/21  0735 04/30/21  0707   WBC 6.3 6.1 6.7   HGB 8.3* 8.8* 8.7*   HCT 28.6* 29.6* 29.1*    259 257       Recent Labs     04/28/21 1757 04/29/21  0735 04/30/21  0707    135 137   K 3.7 3.6 3.6    101 103   CO2 23 25 23   BUN 10 8 11   CREATININE 0.8 0.8 0.8   CALCIUM 8.6 8.5* 8.6       Recent Labs     04/28/21  1757 04/29/21  0735 04/30/21  0707   PROT 6.6 6.5 6.4   ALKPHOS 87 83 79   ALT 5 5 5   AST 8 10 10   BILITOT 0.3 0.3 0.4       No results for input(s): INR in the last 72 hours.     Recent Labs     04/27/21  2303 04/28/21  0808   TROPONINI <0.01 <0.01       Chronic labs:    Lab Results   Component Value Date    CHOL 156 04/27/2021    TRIG 125 04/27/2021    HDL 30 04/27/2021    LDLCALC 101 (H) 04/27/2021    TSH 0.354 04/27/2021    INR 1.2 04/26/2021 LABA1C 5.7 (H) 04/27/2021       Radiology: REVIEWED DAILY    +++++++++++++++++++++++++++++++++++++++++++++++++  Orestes Aguiar  [address]  +++++++++++++++++++++++++++++++++++++++++++++++++  NOTE: This report was transcribed using voice recognition software. Every effort was made to ensure accuracy; however, inadvertent computerized transcription errors may be present.

## 2021-05-01 ENCOUNTER — APPOINTMENT (OUTPATIENT)
Dept: ULTRASOUND IMAGING | Age: 66
DRG: 065 | End: 2021-05-01
Payer: MEDICARE

## 2021-05-01 LAB
ALBUMIN SERPL-MCNC: 3.6 G/DL (ref 3.5–5.2)
ALP BLD-CCNC: 93 U/L (ref 35–104)
ALT SERPL-CCNC: 5 U/L (ref 0–32)
ANION GAP SERPL CALCULATED.3IONS-SCNC: 11 MMOL/L (ref 7–16)
ANISOCYTOSIS: ABNORMAL
AST SERPL-CCNC: 11 U/L (ref 0–31)
BASOPHILS ABSOLUTE: 0.14 E9/L (ref 0–0.2)
BASOPHILS RELATIVE PERCENT: 1.8 % (ref 0–2)
BILIRUB SERPL-MCNC: 0.3 MG/DL (ref 0–1.2)
BUN BLDV-MCNC: 14 MG/DL (ref 6–23)
BURR CELLS: ABNORMAL
CALCIUM SERPL-MCNC: 9.3 MG/DL (ref 8.6–10.2)
CHLORIDE BLD-SCNC: 100 MMOL/L (ref 98–107)
CO2: 25 MMOL/L (ref 22–29)
CREAT SERPL-MCNC: 0.9 MG/DL (ref 0.5–1)
EOSINOPHILS ABSOLUTE: 0.14 E9/L (ref 0.05–0.5)
EOSINOPHILS RELATIVE PERCENT: 1.8 % (ref 0–6)
GFR AFRICAN AMERICAN: >60
GFR NON-AFRICAN AMERICAN: >60 ML/MIN/1.73
GLUCOSE BLD-MCNC: 165 MG/DL (ref 74–99)
HCT VFR BLD CALC: 32.4 % (ref 34–48)
HEMOGLOBIN: 9.3 G/DL (ref 11.5–15.5)
LYMPHOCYTES ABSOLUTE: 0.83 E9/L (ref 1.5–4)
LYMPHOCYTES RELATIVE PERCENT: 11.4 % (ref 20–42)
MCH RBC QN AUTO: 20.9 PG (ref 26–35)
MCHC RBC AUTO-ENTMCNC: 28.7 % (ref 32–34.5)
MCV RBC AUTO: 72.8 FL (ref 80–99.9)
MONOCYTES ABSOLUTE: 0.45 E9/L (ref 0.1–0.95)
MONOCYTES RELATIVE PERCENT: 6.1 % (ref 2–12)
NEUTROPHILS ABSOLUTE: 5.93 E9/L (ref 1.8–7.3)
NEUTROPHILS RELATIVE PERCENT: 78.9 % (ref 43–80)
OVALOCYTES: ABNORMAL
PDW BLD-RTO: 20.3 FL (ref 11.5–15)
PLATELET # BLD: 290 E9/L (ref 130–450)
PMV BLD AUTO: 8.4 FL (ref 7–12)
POIKILOCYTES: ABNORMAL
POLYCHROMASIA: ABNORMAL
POTASSIUM REFLEX MAGNESIUM: 3.7 MMOL/L (ref 3.5–5)
RBC # BLD: 4.45 E12/L (ref 3.5–5.5)
SODIUM BLD-SCNC: 136 MMOL/L (ref 132–146)
TOTAL PROTEIN: 6.9 G/DL (ref 6.4–8.3)
WBC # BLD: 7.5 E9/L (ref 4.5–11.5)

## 2021-05-01 PROCEDURE — 6360000002 HC RX W HCPCS: Performed by: FAMILY MEDICINE

## 2021-05-01 PROCEDURE — 6370000000 HC RX 637 (ALT 250 FOR IP): Performed by: STUDENT IN AN ORGANIZED HEALTH CARE EDUCATION/TRAINING PROGRAM

## 2021-05-01 PROCEDURE — 85025 COMPLETE CBC W/AUTO DIFF WBC: CPT

## 2021-05-01 PROCEDURE — 93970 EXTREMITY STUDY: CPT

## 2021-05-01 PROCEDURE — 36415 COLL VENOUS BLD VENIPUNCTURE: CPT

## 2021-05-01 PROCEDURE — 6370000000 HC RX 637 (ALT 250 FOR IP): Performed by: CLINICAL NURSE SPECIALIST

## 2021-05-01 PROCEDURE — 1200000000 HC SEMI PRIVATE

## 2021-05-01 PROCEDURE — 99232 SBSQ HOSP IP/OBS MODERATE 35: CPT | Performed by: PHYSICIAN ASSISTANT

## 2021-05-01 PROCEDURE — 2580000003 HC RX 258: Performed by: FAMILY MEDICINE

## 2021-05-01 PROCEDURE — 6370000000 HC RX 637 (ALT 250 FOR IP): Performed by: FAMILY MEDICINE

## 2021-05-01 PROCEDURE — 80053 COMPREHEN METABOLIC PANEL: CPT

## 2021-05-01 PROCEDURE — 93970 EXTREMITY STUDY: CPT | Performed by: RADIOLOGY

## 2021-05-01 RX ADMIN — Medication 10 ML: at 09:48

## 2021-05-01 RX ADMIN — CLOPIDOGREL 75 MG: 75 TABLET, FILM COATED ORAL at 08:45

## 2021-05-01 RX ADMIN — LISINOPRIL 5 MG: 10 TABLET ORAL at 09:47

## 2021-05-01 RX ADMIN — ASPIRIN 81 MG: 81 TABLET, COATED ORAL at 09:47

## 2021-05-01 RX ADMIN — ENOXAPARIN SODIUM 40 MG: 40 INJECTION SUBCUTANEOUS at 08:45

## 2021-05-01 RX ADMIN — Medication 10 ML: at 21:03

## 2021-05-01 RX ADMIN — APIXABAN 10 MG: 5 TABLET, FILM COATED ORAL at 21:01

## 2021-05-01 RX ADMIN — ATORVASTATIN CALCIUM 40 MG: 40 TABLET, FILM COATED ORAL at 21:23

## 2021-05-01 ASSESSMENT — PAIN SCALES - GENERAL: PAINLEVEL_OUTOF10: 0

## 2021-05-01 NOTE — PROGRESS NOTES
Anupama Che is a 73 y.o. right handed female     Patient admitted with altered mental status  Son at bedside stated she had a stroke in 2017 which she refused to be admitted for and walked out after her s/s resolved. In 2017, she was unable to speak and had right sided weakness. Her s/s completely resolved and she left the hospital.   She was following at the  clinic here but appears she has not had an appt since 2012     She was admitted with confusion but no focal neuro deficits   MRI Brain was obtained which did demonstrate and acute stroke on DWI   Unfortunately, DWI were the only images we were able to obtain d/t her lack of cooperation     Son not present this am   Tele-sitter remains present     No focal arm or leg weakness    therapy noted some judy-neglect - moderate assist to ambulate   Plan for acute rehab    ADAM showed small PFO with R to L shunt as well as aneurysmal atrial septum. Denies any focal neurological deficits. No new concerns voiced today. No chest pain or palpitations  No SOB  No falls, tripping or stumbling  No incontinence of bowels or bladder  No itching or bruising appreciated    ROS otherwise negative     Allergies as of 04/26/2021    (No Known Allergies)     Objective:     /63   Pulse 76   Temp 97.2 °F (36.2 °C) (Temporal)   Resp 17   SpO2 100%      General appearance: alert, appears stated age and cooperative  Head: Normocephalic, without obvious abnormality, atraumatic  Extremities: no cyanosis or edema  Pulses: 2+ and symmetric  Skin: no rashes or lesions    Mental Status: Alert, oriented to person, place and situation. Follows commands well.      Speech: clear  Language: appropriate    No anomia    Cranial Nerves:  I: smell    II: visual acuity     II: visual fields Full   II: pupils MASHA   III,VII: ptosis None   III,IV,VI: extraocular muscles  EOMI without nystagmus    V: mastication Normal   V: facial light touch sensation  Normal   V,VII: corneal reflex Present   VII: facial muscle function - upper     VII: facial muscle function - lower Normal   VIII: hearing Normal   IX: soft palate elevation  Normal   IX,X: gag reflex    XI: trapezius strength  5/5   XI: sternocleidomastoid strength 5/5   XI: neck extension strength  5/5   XII: tongue strength  Normal     Motor:  5/5 throughout  Normal bulk and tone  No abnormal movements     Sensory:  Normal to LT   Vibration normal in the ankles     Impaired 2 point discrimination in arms and legs     Coordination:   FN, FFM and SPENCER symmetrical  HS symmetrical     DTR:   No reflexes    No Aleman's     Laboratory/Radiology:     CBC with Differential:    Lab Results   Component Value Date    WBC 6.7 04/30/2021    RBC 4.13 04/30/2021    HGB 8.7 04/30/2021    HCT 29.1 04/30/2021     04/30/2021    MCV 70.5 04/30/2021    MCH 21.1 04/30/2021    MCHC 29.9 04/30/2021    RDW 19.7 04/30/2021    NRBC 0.9 04/26/2021    LYMPHOPCT 17.7 04/30/2021    MONOPCT 7.0 04/30/2021    BASOPCT 0.6 04/30/2021    MONOSABS 0.47 04/30/2021    LYMPHSABS 1.19 04/30/2021    EOSABS 0.26 04/30/2021    BASOSABS 0.04 04/30/2021     CMP:    Lab Results   Component Value Date     04/30/2021    K 3.6 04/30/2021     04/30/2021    CO2 23 04/30/2021    BUN 11 04/30/2021    CREATININE 0.8 04/30/2021    GFRAA >60 04/30/2021    LABGLOM >60 04/30/2021    GLUCOSE 108 04/30/2021    PROT 6.4 04/30/2021    LABALBU 3.0 04/30/2021    CALCIUM 8.6 04/30/2021    BILITOT 0.4 04/30/2021    ALKPHOS 79 04/30/2021    AST 10 04/30/2021    ALT 5 04/30/2021     HgBA1c:    Lab Results   Component Value Date    LABA1C 5.7 04/27/2021     FLP:    Lab Results   Component Value Date    TRIG 125 04/27/2021    HDL 30 04/27/2021    LDLCALC 101 04/27/2021    LABVLDL 25 04/27/2021       MRI Brain: - DWI only   Findings consistent with acute infarct in the right middle cerebral artery  territory involving the right parietal lobe, posterior temporal lobe and  insular cortex. CTA:  1.  Mild atherosclerotic disease . No large vessel occlusion of the  2. Estimated stenosis of the proximal right and left internal carotid  artery by NASCET criteria is not hemodynamically significant    Echo    Ejection fraction is visually estimated at 60-65%. Atrial septum is aneurysmal.   Agitated saline injected for shunt evaluation. Small PFO noted with right to left shunting of bubbles. No significant valvular abnormalities. No intracardiac thrombus or mass. I personally reviewed the patient's lab and imaging studies at this time.     Assessment:     Embolic appearing Large right posterior MCA ischemic infarct most likely causing confusion from her non-dominant hemispheric involvement    ADAM with small PFO and aneurysmal atrial  septum -- will obtain venous US BLE to r/o DVT   Will still need event monitoring as OP     On exam, she does very well ; however when distracted or not concentrating on a task, she demonstrates her judy-neglect     tobacco abuse     Plan:     Venous US BLE to r/o DVT   If negative, okay to d/c from neuro     Recommend 30 day event monitor as OP     Continue DAPT    Continue statin     Transfer to rehab pending     OP neuro follow up     Please notify neuro if [address] is abnormal     Neuro will sign off, please call with questions     Shahbaz Perez PA-C  8:58 AM  5/1/2021

## 2021-05-01 NOTE — PLAN OF CARE
Problem: Skin Integrity:  Goal: Will show no infection signs and symptoms  Description: Will show no infection signs and symptoms  Outcome: Met This Shift  Goal: Absence of new skin breakdown  Description: Absence of new skin breakdown  Outcome: Met This Shift     Problem: Falls - Risk of:  Goal: Will remain free from falls  Description: Will remain free from falls  Outcome: Met This Shift  Goal: Absence of physical injury  Description: Absence of physical injury  Outcome: Met This Shift     Problem: Confusion - Acute:  Goal: Absence of continued neurological deterioration signs and symptoms  Description: Absence of continued neurological deterioration signs and symptoms  Outcome: Met This Shift  Goal: Mental status will be restored to baseline  Description: Mental status will be restored to baseline  Outcome: Met This Shift     Problem: Discharge Planning:  Goal: Ability to perform activities of daily living will improve  Description: Ability to perform activities of daily living will improve  Outcome: Met This Shift  Goal: Participates in care planning  Description: Participates in care planning  Outcome: Met This Shift     Problem: Injury - Risk of, Physical Injury:  Goal: Will remain free from falls  Description: Will remain free from falls  Outcome: Met This Shift  Goal: Absence of physical injury  Description: Absence of physical injury  Outcome: Met This Shift     Problem: Mood - Altered:  Goal: Mood stable  Description: Mood stable  Outcome: Met This Shift  Goal: Absence of abusive behavior  Description: Absence of abusive behavior  Outcome: Met This Shift  Goal: Verbalizations of feeling emotionally comfortable while being cared for will increase  Description: Verbalizations of feeling emotionally comfortable while being cared for will increase  Outcome: Met This Shift     Problem: Psychomotor Activity - Altered:  Goal: Absence of psychomotor disturbance signs and symptoms  Description: Absence of psychomotor disturbance signs and symptoms  Outcome: Met This Shift     Problem: Sensory Perception - Impaired:  Goal: Demonstrations of improved sensory functioning will increase  Description: Demonstrations of improved sensory functioning will increase  Outcome: Met This Shift  Goal: Decrease in sensory misperception frequency  Description: Decrease in sensory misperception frequency  Outcome: Met This Shift  Goal: Able to refrain from responding to false sensory perceptions  Description: Able to refrain from responding to false sensory perceptions  Outcome: Met This Shift  Goal: Demonstrates accurate environmental perceptions  Description: Demonstrates accurate environmental perceptions  Outcome: Met This Shift  Goal: Able to distinguish between reality-based and nonreality-based thinking  Description: Able to distinguish between reality-based and nonreality-based thinking  Outcome: Met This Shift  Goal: Able to interrupt nonreality-based thinking  Description: Able to interrupt nonreality-based thinking  Outcome: Met This Shift     Problem: Sleep Pattern Disturbance:  Goal: Appears well-rested  Description: Appears well-rested  Outcome: Ongoing

## 2021-05-01 NOTE — PROGRESS NOTES
Hospitalist Progress Note      SYNOPSIS: Patient admitted on 2021 for AMS    SUBJECTIVE:  Patient seen and examined at bedside in NAD. Pt without gross focal deficits. No acute events overnight. Mentation with continued improvement. DVT of B/L LE with positive LLE DVT. Records reviewed. Stable overnight. No other overnight issues reported. Temp (24hrs), Av.2 °F (36.2 °C), Min:97.1 °F (36.2 °C), Max:97.2 °F (36.2 °C)    DIET: DIET CARB CONTROL; Carb Control: 4 carb choices (60 gms)/meal  CODE: Full Code    Intake/Output Summary (Last 24 hours) at 2021 1508  Last data filed at 2021 1230  Gross per 24 hour   Intake 480 ml   Output --   Net 480 ml       OBJECTIVE:    /63   Pulse 76   Temp 97.2 °F (36.2 °C) (Temporal)   Resp 17   Ht 5' 2\" (1.575 m)   Wt 231 lb (104.8 kg)   SpO2 100%   BMI 42.25 kg/m²     General appearance: No apparent distress, appears stated age and cooperative. HEENT:  Conjunctivae/corneas clear. Neck: Supple. No jugular venous distention. Respiratory: Clear to auscultation bilaterally, normal respiratory effort  Cardiovascular: Regular rate rhythm, normal S1-S2  Abdomen: Soft, nontender, nondistended  Musculoskeletal: No clubbing, cyanosis, no bilateral lower extremity edema. Brisk capillary refill. Skin:  No rashes  on visible skin  Neurologic: awake, alert and following commands     ASSESSMENT:  Acute Encephalopathy  Acute Ischemic CVA  Paradoxical Embolism  KEE  Lactic Acidosis  Hyperglycemia  Microcytic Anemia  Acute Left Lower Extremity DVT     PLAN:  - CTH on admission negative for acute intracranial pathology  - CTA Head and Neck with mild atherosclerotic disease.  No large vessel occlusion  - CT brain perfusion with there is evidence for core infarct in the right frontal lobe likely old, no acute infarct or ischemia identified  - MRI brain without contrast showed findings consistent with acute infarct in the right middle cerebral artery territory involving the right parietal lobe, posterior temporal lobe and insular cortex. - Neurology c/s noted and appreciated; following  - ADAM report on 04/30/21 with Normal LV function. Normal RV function. No hemodynamically significant valve disease. No intracardiac mass or thrombus. Small PFO noted with an aneurysmal atrial septum. Right to left shunting of few bubbles on agitated saline study.  - B/L LE with acute LLE DVT  - Suspect paradoxical embolism  - Cont with tele, event monitor as outpatient  - Start on Eliquis load x 7 days followed by maintenance thereafter  - Stop ASA  - Cont with Plavix and high intensity statin  - Case discussed with Neurology  - Aspiration, Fall and Seizure precautions  - Encourage PO hydration. Renal function WNL. DISPOSITION: Intermediate. Pt is hemodynamically stable. Awaiting prior auth for [facility]    Medications:  REVIEWED DAILY    Infusion Medications    sodium chloride       Scheduled Medications    lisinopril  5 mg Oral Daily    clopidogrel  75 mg Oral Daily    sodium chloride flush  5-40 mL Intravenous 2 times per day    aspirin  81 mg Oral Daily    Or    aspirin  300 mg Rectal Daily    enoxaparin  40 mg Subcutaneous Daily    atorvastatin  40 mg Oral Nightly     PRN Meds: sodium chloride flush, sodium chloride, promethazine **OR** ondansetron, polyethylene glycol, perflutren lipid microspheres    Labs:     Recent Labs     04/29/21  0735 04/30/21  0707 05/01/21  0906   WBC 6.1 6.7 7.5   HGB 8.8* 8.7* 9.3*   HCT 29.6* 29.1* 32.4*    257 290       Recent Labs     04/29/21  0735 04/30/21  0707 05/01/21  0906    137 136   K 3.6 3.6 3.7    103 100   CO2 25 23 25   BUN 8 11 14   CREATININE 0.8 0.8 0.9   CALCIUM 8.5* 8.6 9.3       Recent Labs     04/29/21  0735 04/30/21  0707 05/01/21  0906   PROT 6.5 6.4 6.9   ALKPHOS 83 79 93   ALT 5 5 5   AST 10 10 11   BILITOT 0.3 0.4 0.3       No results for input(s): INR in the last 72 hours.     No results for input(s): Aron Yeungberry in the last 72 hours. Chronic labs:    Lab Results   Component Value Date    CHOL 156 04/27/2021    TRIG 125 04/27/2021    HDL 30 04/27/2021    LDLCALC 101 (H) 04/27/2021    TSH 0.354 04/27/2021    INR 1.2 04/26/2021    LABA1C 5.7 (H) 04/27/2021       Radiology: REVIEWED DAILY  Echo Transesophageal    Result Date: 4/30/2021  Transesophageal Echocardiography Report (ADAM)   Demographics   Patient Name       Lakesha Silver Gender               Female   Medical Record     [id number]       Room Number          4000  Number   Account #          [de-identified]      Procedure Date       04/30/2021   Corporate ID                      Ordering Physician   Accession Number   [id number]     Referring Physician   Date of Birth      1955     [facility] [address]   Age                73 year(s)     Interpreting         Trav Mcfadden MD                                    Physician                                     Any Other  Procedure Type of Study   ADAM procedure:Transesophageal Echo (ADAM), Doppler Echocardiography, Color  Flow Velocity Mapping. Procedure Date Date: 04/30/2021 Start: 01:43 PM Study Location: Portable Technical Quality: Adequate visualization Indications:CVA. Patient Status: Routine Contrast Medium: Bubble Study. Height: 63 inches Weight: 160 pounds BSA: 1.76 m^2 BMI: 28.34 kg/m^2 Rhythm: Within normal limits Allergies   - No known allergies. Findings   Left Ventricle  Normal left ventricular size and systolic function. Ejection fraction is visually estimated at 60-65%. Right Ventricle  Normal right ventricular size and function. Left Atrium  Normal sized left atrium. No evidence of thrombus within left atrium or appendage. Atrial septum is aneurysmal.  Agitated saline injected for shunt evaluation. Small PFO noted with right to left shunting of bubbles. Right Atrium  Normal right atrial size. No evidence of thrombus or mass in the right atrium.    Mitral Valve FINDINGS: The lungs are without acute focal process. Interstitial prominence, likely chronic. There is no effusion or pneumothorax. The cardiomediastinal silhouette is without acute process. The osseous structures are without acute process. No acute process. Interstitial prominence, likely chronic. Cta Neck W Contrast    Result Date: 2021  Patient MRN:  [id number] : 1955 Age: 73 years Gender: Female Order Date:  2021 EXAM: CTA NECK W CONTRAST, CTA HEAD W CONTRAST NUMBER OF IMAGES:  1453 INDICATION:  cva cva COMPARISON: None Technique: Low-dose CT  acquisition technique included one of following options; 1 . Automated exposure control, 2. Adjustment of MA and or KV according to patient's size or 3. Use of iterative reconstruction. Contiguous spiral images were obtained in the axial plane, following the administration of intravenous contrast using CT angiographic protocol. Sagittal and coronal images were reconstructed from the axial plane acquisition. Additional MIP reconstructions were presented to aid in the interpretation of this study. Images were obtained from the skull base cranially. There is mild calcified plaque identified in the vessels compatible with atherosclerotic disease. There is aortic arch and great vessels demonstrate mild atherosclerotic disease. The right carotid is unremarkable. The left carotid is unremarkable. The right vertebral artery is unremarkable. The left vertebral artery is unremarkable. The basilar artery is unremarkable. The middle cerebral arteries are unremarkable. The anterior cerebral arteries are unremarkable. The posterior cerebral arteries are unremarkable. 1.  Mild atherosclerotic disease . No large vessel occlusion of the 2. Estimated stenosis of the proximal right and left internal carotid artery by NASCET criteria is not hemodynamically significant This study was analyzed by the Viz. ai algorithm.      Ct Brain Perfusion    Result Date: 2021  Patient MRN: [id number] : 1955 Age:  73 years Gender: Female Order Date: 2021 Exam: CT BRAIN PERFUSION Number of Images: 620 views Indication:   cva cva Comparison: None. Findings: Perfusion images demonstrate asymmetric blood volume Blood flow images demonstrate symmetric blood flow There is no significant ischemic penumbra identified. There is significant core infarct identified. There is evidence for core infarct in the right frontal lobe likely old, no acute infarct or ischemia identified This study was analyzed by the Viz. ai algorithm. Cta Head W Contrast    Result Date: 2021  Patient MRN:  [id number] : 1955 Age: 73 years Gender: Female Order Date:  2021 EXAM: CTA NECK W CONTRAST, CTA HEAD W CONTRAST NUMBER OF IMAGES:  1453 INDICATION:  cva cva COMPARISON: None Technique: Low-dose CT  acquisition technique included one of following options; 1 . Automated exposure control, 2. Adjustment of MA and or KV according to patient's size or 3. Use of iterative reconstruction. Contiguous spiral images were obtained in the axial plane, following the administration of intravenous contrast using CT angiographic protocol. Sagittal and coronal images were reconstructed from the axial plane acquisition. Additional MIP reconstructions were presented to aid in the interpretation of this study. Images were obtained from the skull base cranially. There is mild calcified plaque identified in the vessels compatible with atherosclerotic disease. There is aortic arch and great vessels demonstrate mild atherosclerotic disease. The right carotid is unremarkable. The left carotid is unremarkable. The right vertebral artery is unremarkable. The left vertebral artery is unremarkable. The basilar artery is unremarkable. The middle cerebral arteries are unremarkable. The anterior cerebral arteries are unremarkable. The posterior cerebral arteries are unremarkable.      1.  Mild atherosclerotic disease . No large vessel occlusion of the 2. Estimated stenosis of the proximal right and left internal carotid artery by NASCET criteria is not hemodynamically significant This study was analyzed by the Viz. ai algorithm. Mri Brain Without Contrast    Result Date: 2021  EXAMINATION: MRI OF THE BRAIN WITHOUT CONTRAST  2021 8:46 am TECHNIQUE: Multiplanar multisequence MRI of the brain was performed without the administration of intravenous contrast. COMPARISON: Head CT dated 2021 HISTORY: ORDERING SYSTEM PROVIDED HISTORY: AMS TECHNOLOGIST PROVIDED HISTORY: Reason for exam:->AMS What reading provider will be dictating this exam?->CRC FINDINGS: The patient was uncooperative and only axial diffusion-weighted images could be done. These images are also degraded by motion artifact. There is a large area of restricted diffusion, consistent with acute ischemia, in the right parietal lobe and also involving the posterior temporal lobe and posterior insular cortex. There is an old right frontal infarct. INTRACRANIAL STRUCTURES/VENTRICLES: There is no acute infarct. No mass effect or midline shift. No evidence of an acute intracranial hemorrhage. The ventricles and sulci are normal in size and configuration. The sellar/suprasellar regions appear unremarkable. The normal signal voids within the major intracranial vessels appear maintained. Findings consistent with acute infarct in the right middle cerebral artery territory involving the right parietal lobe, posterior temporal lobe and insular cortex.      Us Dup Lower Extremities Bilateral Venous    Result Date: 2021  Patient MRN:  [id number] : 1955 Age: 73 years Gender: Female Order Date:  2021 11:29 AM EXAM: US DUP LOWER EXTREMITIES BILATERAL VENOUS NUMBER OF IMAGES:  61 INDICATION:  r/o DVT r/o DVT What reading provider will be dictating this exam?->[facility] COMPARISON: None There is evidence for deep venous thrombosis in the left lower extremity involving the posterior tibial vein There is otherwise good compressibility, there is good augmentation, there is good color flow. There is evidence for deep venous thrombosis in the left posterior tibial vein ALERT:  THIS IS AN ABNORMAL REPORT     +++++++++++++++++++++++++++++++++++++++++++++++++  [facility] Physician - [address] [place] [address]  +++++++++++++++++++++++++++++++++++++++++++++++++  NOTE: This report was transcribed using voice recognition software. Every effort was made to ensure accuracy; however, inadvertent computerized transcription errors may be present.

## 2021-05-02 LAB
ALBUMIN SERPL-MCNC: 3.3 G/DL (ref 3.5–5.2)
ALP BLD-CCNC: 84 U/L (ref 35–104)
ALT SERPL-CCNC: 6 U/L (ref 0–32)
ANION GAP SERPL CALCULATED.3IONS-SCNC: 11 MMOL/L (ref 7–16)
ANISOCYTOSIS: ABNORMAL
AST SERPL-CCNC: 10 U/L (ref 0–31)
BASOPHILS ABSOLUTE: 0.05 E9/L (ref 0–0.2)
BASOPHILS RELATIVE PERCENT: 0.6 % (ref 0–2)
BILIRUB SERPL-MCNC: 0.3 MG/DL (ref 0–1.2)
BLOOD CULTURE, ROUTINE: NORMAL
BLOOD CULTURE, ROUTINE: NORMAL
BUN BLDV-MCNC: 16 MG/DL (ref 6–23)
CALCIUM SERPL-MCNC: 9.2 MG/DL (ref 8.6–10.2)
CHLORIDE BLD-SCNC: 102 MMOL/L (ref 98–107)
CO2: 24 MMOL/L (ref 22–29)
CREAT SERPL-MCNC: 0.9 MG/DL (ref 0.5–1)
CULTURE, BLOOD 2: NORMAL
EOSINOPHILS ABSOLUTE: 0.37 E9/L (ref 0.05–0.5)
EOSINOPHILS RELATIVE PERCENT: 4.7 % (ref 0–6)
GFR AFRICAN AMERICAN: >60
GFR NON-AFRICAN AMERICAN: >60 ML/MIN/1.73
GLUCOSE BLD-MCNC: 116 MG/DL (ref 74–99)
HCT VFR BLD CALC: 30.2 % (ref 34–48)
HEMOGLOBIN: 8.6 G/DL (ref 11.5–15.5)
HYPOCHROMIA: ABNORMAL
IMMATURE GRANULOCYTES #: 0.03 E9/L
IMMATURE GRANULOCYTES %: 0.4 % (ref 0–5)
LYMPHOCYTES ABSOLUTE: 1.56 E9/L (ref 1.5–4)
LYMPHOCYTES RELATIVE PERCENT: 20 % (ref 20–42)
MCH RBC QN AUTO: 20.5 PG (ref 26–35)
MCHC RBC AUTO-ENTMCNC: 28.5 % (ref 32–34.5)
MCV RBC AUTO: 71.9 FL (ref 80–99.9)
MONOCYTES ABSOLUTE: 0.49 E9/L (ref 0.1–0.95)
MONOCYTES RELATIVE PERCENT: 6.3 % (ref 2–12)
NEUTROPHILS ABSOLUTE: 5.29 E9/L (ref 1.8–7.3)
NEUTROPHILS RELATIVE PERCENT: 68 % (ref 43–80)
OVALOCYTES: ABNORMAL
PDW BLD-RTO: 20.3 FL (ref 11.5–15)
PLATELET # BLD: 304 E9/L (ref 130–450)
PMV BLD AUTO: 8.8 FL (ref 7–12)
POIKILOCYTES: ABNORMAL
POLYCHROMASIA: ABNORMAL
POTASSIUM REFLEX MAGNESIUM: 4 MMOL/L (ref 3.5–5)
RBC # BLD: 4.2 E12/L (ref 3.5–5.5)
SODIUM BLD-SCNC: 137 MMOL/L (ref 132–146)
TOTAL PROTEIN: 6.4 G/DL (ref 6.4–8.3)
WBC # BLD: 7.8 E9/L (ref 4.5–11.5)

## 2021-05-02 PROCEDURE — 81241 F5 GENE: CPT

## 2021-05-02 PROCEDURE — 97530 THERAPEUTIC ACTIVITIES: CPT

## 2021-05-02 PROCEDURE — 6370000000 HC RX 637 (ALT 250 FOR IP): Performed by: STUDENT IN AN ORGANIZED HEALTH CARE EDUCATION/TRAINING PROGRAM

## 2021-05-02 PROCEDURE — 80053 COMPREHEN METABOLIC PANEL: CPT

## 2021-05-02 PROCEDURE — 81240 F2 GENE: CPT

## 2021-05-02 PROCEDURE — 1200000000 HC SEMI PRIVATE

## 2021-05-02 PROCEDURE — 2580000003 HC RX 258: Performed by: FAMILY MEDICINE

## 2021-05-02 PROCEDURE — 36415 COLL VENOUS BLD VENIPUNCTURE: CPT

## 2021-05-02 PROCEDURE — 6370000000 HC RX 637 (ALT 250 FOR IP): Performed by: CLINICAL NURSE SPECIALIST

## 2021-05-02 PROCEDURE — 81400 MOPATH PROCEDURE LEVEL 1: CPT

## 2021-05-02 PROCEDURE — 81291 MTHFR GENE: CPT

## 2021-05-02 PROCEDURE — 6370000000 HC RX 637 (ALT 250 FOR IP): Performed by: FAMILY MEDICINE

## 2021-05-02 PROCEDURE — 85025 COMPLETE CBC W/AUTO DIFF WBC: CPT

## 2021-05-02 RX ADMIN — APIXABAN 10 MG: 5 TABLET, FILM COATED ORAL at 08:04

## 2021-05-02 RX ADMIN — LISINOPRIL 5 MG: 10 TABLET ORAL at 08:05

## 2021-05-02 RX ADMIN — Medication 10 ML: at 08:07

## 2021-05-02 RX ADMIN — APIXABAN 10 MG: 5 TABLET, FILM COATED ORAL at 20:13

## 2021-05-02 RX ADMIN — Medication 10 ML: at 20:14

## 2021-05-02 RX ADMIN — ATORVASTATIN CALCIUM 40 MG: 40 TABLET, FILM COATED ORAL at 20:13

## 2021-05-02 RX ADMIN — CLOPIDOGREL 75 MG: 75 TABLET, FILM COATED ORAL at 08:07

## 2021-05-02 NOTE — PROGRESS NOTES
Hospitalist Progress Note      SYNOPSIS: Patient admitted on 2021 for AMS    SUBJECTIVE:  Patient seen and examined at bedside in NAD. Pt without gross focal deficits. No acute events overnight. Mentation with continued improvement. Records reviewed. Stable overnight. No other overnight issues reported. Temp (24hrs), Av.2 °F (36.2 °C), Min:96.7 °F (35.9 °C), Max:97.9 °F (36.6 °C)    DIET: DIET CARB CONTROL; Carb Control: 4 carb choices (60 gms)/meal  CODE: Full Code    Intake/Output Summary (Last 24 hours) at 2021 1326  Last data filed at 2021 1900  Gross per 24 hour   Intake 120 ml   Output --   Net 120 ml       OBJECTIVE:    BP (!) 119/58   Pulse 95   Temp 97 °F (36.1 °C) (Temporal)   Resp 16   Ht 5' 2\" (1.575 m)   Wt 231 lb (104.8 kg)   SpO2 95%   BMI 42.25 kg/m²     General appearance: No apparent distress, appears stated age and cooperative. HEENT:  Conjunctivae/corneas clear. Neck: Supple. No jugular venous distention. Respiratory: Clear to auscultation bilaterally, normal respiratory effort  Cardiovascular: Regular rate rhythm, normal S1-S2  Abdomen: Soft, nontender, nondistended  Musculoskeletal: No clubbing, cyanosis, no bilateral lower extremity edema. Brisk capillary refill. Skin:  No rashes  on visible skin  Neurologic: awake, alert and following commands     ASSESSMENT:  Acute Encephalopathy  Acute Ischemic CVA  Paradoxical Embolism  KEE  Lactic Acidosis  Hyperglycemia  Microcytic Anemia  Acute Left Lower Extremity DVT     PLAN:  - CTH on admission negative for acute intracranial pathology  - CTA Head and Neck with mild atherosclerotic disease.  No large vessel occlusion  - CT brain perfusion with there is evidence for core infarct in the right frontal lobe likely old, no acute infarct or ischemia identified  - MRI brain without contrast showed findings consistent with acute infarct in the right middle cerebral artery territory involving the right parietal lobe, posterior temporal lobe and insular cortex. - Neurology c/s noted and appreciated; following  - ADAM report on 04/30/21 with Normal LV function. Normal RV function. No hemodynamically significant valve disease. No intracardiac mass or thrombus. Small PFO noted with an aneurysmal atrial septum. Right to left shunting of few bubbles on agitated saline study.  - B/L LE with acute LLE DVT  - Suspect paradoxical embolism  - Cont with tele, event monitor as outpatient  - Start on Eliquis load x 7 days followed by maintenance thereafter  - Stop ASA  - Hypercoagulable work-up  - Cont with Plavix and high intensity statin  - Neurology c/s noted and appreciated; outpatient follow-up  - Outpatient hematology/PCP  - Aspiration, Fall and Seizure precautions  - Encourage PO hydration. Renal function WNL. DISPOSITION: Intermediate. Pt is hemodynamically stable.  Awaiting prior auth for ROCHELLE    Medications:  REVIEWED DAILY    Infusion Medications    sodium chloride       Scheduled Medications    apixaban  10 mg Oral BID    Followed by   Liliya Smith ON 5/8/2021] apixaban  5 mg Oral BID    lisinopril  5 mg Oral Daily    clopidogrel  75 mg Oral Daily    sodium chloride flush  5-40 mL Intravenous 2 times per day    atorvastatin  40 mg Oral Nightly     PRN Meds: sodium chloride flush, sodium chloride, promethazine **OR** ondansetron, polyethylene glycol, perflutren lipid microspheres    Labs:     Recent Labs     04/30/21  0707 05/01/21  0906 05/02/21  0454   WBC 6.7 7.5 7.8   HGB 8.7* 9.3* 8.6*   HCT 29.1* 32.4* 30.2*    290 304       Recent Labs     04/30/21  0707 05/01/21  0906 05/02/21  0454    136 137   K 3.6 3.7 4.0    100 102   CO2 23 25 24   BUN 11 14 16   CREATININE 0.8 0.9 0.9   CALCIUM 8.6 9.3 9.2       Recent Labs     04/30/21  0707 05/01/21  0906 05/02/21  0454   PROT 6.4 6.9 6.4   ALKPHOS 79 93 84   ALT 5 5 6   AST 10 11 10   BILITOT 0.4 0.3 0.3       No results for input(s): INR in the last 72 hours. No results for input(s): Ethelene Soulier in the last 72 hours. Chronic labs:    Lab Results   Component Value Date    CHOL 156 04/27/2021    TRIG 125 04/27/2021    HDL 30 04/27/2021    LDLCALC 101 (H) 04/27/2021    TSH 0.354 04/27/2021    INR 1.2 04/26/2021    LABA1C 5.7 (H) 04/27/2021       Radiology: REVIEWED DAILY  Echo Transesophageal    Result Date: 4/30/2021  Transesophageal Echocardiography Report (ADAM)   Demographics   Patient Name       Gretchen Nam Gender               Female   Medical Record     [id number]       Room Number          8312  Number   Account #          [de-identified]      Procedure Date       04/30/2021   Corporate ID                      Ordering Physician   Accession Number   [id number]     Referring Physician   Date of Birth      1955     [facility] 346   Age                73 year(s)     Interpreting         Husam Malhotra MD                                    Physician                                     Any Other  Procedure Type of Study   ADAM procedure:Transesophageal Echo (ADAM), Doppler Echocardiography, Color  Flow Velocity Mapping. Procedure Date Date: 04/30/2021 Start: 01:43 PM Study Location: Portable Technical Quality: Adequate visualization Indications:CVA. Patient Status: Routine Contrast Medium: Bubble Study. Height: 63 inches Weight: 160 pounds BSA: 1.76 m^2 BMI: 28.34 kg/m^2 Rhythm: Within normal limits Allergies   - No known allergies. Findings   Left Ventricle  Normal left ventricular size and systolic function. Ejection fraction is visually estimated at 60-65%. Right Ventricle  Normal right ventricular size and function. Left Atrium  Normal sized left atrium. No evidence of thrombus within left atrium or appendage. Atrial septum is aneurysmal.  Agitated saline injected for shunt evaluation. Small PFO noted with right to left shunting of bubbles. Right Atrium  Normal right atrial size.   No evidence of thrombus or mass in the right atrium. Mitral Valve  Structurally normal mitral valve. No evidence of mitral valve stenosis. Physiologic mitral regurgitation. Tricuspid Valve  The tricuspid valve appears structurally normal.  Physiologic and/or trace tricuspid regurgitation. Aortic Valve  Structurally normal aortic valve. The aortic valve is trileaflet. No hemodynamically significant aortic stenosis is present. No evidence of aortic valve regurgitation. Pulmonic Valve  Pulmonic valve is structurally normal.  No evidence of any pulmonic regurgitation. Pericardial Effusion  No evidence of pericardial effusion. Aorta  Mild atherosclerotic disease of the descending thoracic aorta. Miscellaneous  LUPV: Normal flow  LLPV: Normal flow  RUPV: Normal flow  RLPV: Normal flow   Conclusions   Summary  Ejection fraction is visually estimated at 60-65%. Atrial septum is aneurysmal.  Agitated saline injected for shunt evaluation. Small PFO noted with right to left shunting of bubbles. No significant valvular abnormalities. No intracardiac thrombus or mass. Signature   ----------------------------------------------------------------  Electronically signed by Mathieu Junior MD(Interpreting  physician) on 04/30/2021 10:57 PM  ----------------------------------------------------------------  [email or web address] [id number]    Ct Head Wo Contrast    Result Date: 4/26/2021  EXAMINATION: CT OF THE HEAD WITHOUT CONTRAST  4/26/2021 11:15 pm TECHNIQUE: CT of the head was performed without the administration of intravenous contrast. Dose modulation, iterative reconstruction, and/or weight based adjustment of the mA/kV was utilized to reduce the radiation dose to as low as reasonably achievable. COMPARISON: None.  HISTORY: ORDERING SYSTEM PROVIDED HISTORY: cva TECHNOLOGIST PROVIDED HISTORY: Reason for exam:->cva Has a \"code stroke\" or \"stroke alert\" been called? ->Yes Decision Support Exception->Emergency Medical Condition (MA) FINDINGS: There is no acute infarction, intracranial hemorrhage or intracranial mass lesion. No mass effect, midline shift or extra-axial collection is noted. There are moderate nonspecific foci of periventricular and subcortical cerebral white matter hypodensity, most likely representing chronic microangiopathic disease in this age group. Chronic encephalomalacia involving right medial occipital lobe and anterior aspect of the right superior frontal gyrus and chronic encephalomalacia of bilateral corona radiata and bilateral thalamus and bilateral basal ganglia. The brain parenchyma is otherwise normal. The cerebellar tonsils are in normal position. The ventricles, sulci, and cisterns are mildly prominent suggestive of mild generalized volume loss. The globes and orbits are within normal limits. The visualized extracranial structures including paranasal sinuses and mastoid air cells are unremarkable. No fracture is identified. 1.  No evidence for acute intracranial hemorrhage, territorial infarction or intracranial mass lesion. However considering the extensive amount of white matter changes and extensive prior areas of infarction, brain MRI is more sensitive for detection of subtle foci of acute infarction. 2.  Chronic encephalomalacia involving right medial occipital lobe and anterior aspect of the right superior frontal gyrus and chronic encephalomalacia of bilateral corona radiata and bilateral thalamus and bilateral basal ganglia. 3.  Moderate chronic microangiopathic ischemic disease. Mild generalized volume loss.      Xr Chest Portable    Result Date: 4/27/2021  EXAMINATION: ONE XRAY VIEW OF THE CHEST 4/26/2021 11:37 pm COMPARISON: March 28, 2010 HISTORY: ORDERING SYSTEM PROVIDED HISTORY: altered mental status TECHNOLOGIST PROVIDED HISTORY: Reason for exam:->altered mental status What reading provider will be dictating this exam?->CRC FINDINGS: The lungs are without acute focal process. Interstitial prominence, likely chronic. There is no effusion or pneumothorax. The cardiomediastinal silhouette is without acute process. The osseous structures are without acute process. No acute process. Interstitial prominence, likely chronic. Cta Neck W Contrast    Result Date: 2021  Patient MRN:  [id number] : 1955 Age: 73 years Gender: Female Order Date:  2021 EXAM: CTA NECK W CONTRAST, CTA HEAD W CONTRAST NUMBER OF IMAGES:  1453 INDICATION:  cva cva COMPARISON: None Technique: Low-dose CT  acquisition technique included one of following options; 1 . Automated exposure control, 2. Adjustment of MA and or KV according to patient's size or 3. Use of iterative reconstruction. Contiguous spiral images were obtained in the axial plane, following the administration of intravenous contrast using CT angiographic protocol. Sagittal and coronal images were reconstructed from the axial plane acquisition. Additional MIP reconstructions were presented to aid in the interpretation of this study. Images were obtained from the skull base cranially. There is mild calcified plaque identified in the vessels compatible with atherosclerotic disease. There is aortic arch and great vessels demonstrate mild atherosclerotic disease. The right carotid is unremarkable. The left carotid is unremarkable. The right vertebral artery is unremarkable. The left vertebral artery is unremarkable. The basilar artery is unremarkable. The middle cerebral arteries are unremarkable. The anterior cerebral arteries are unremarkable. The posterior cerebral arteries are unremarkable. 1.  Mild atherosclerotic disease . No large vessel occlusion of the 2. Estimated stenosis of the proximal right and left internal carotid artery by NASCET criteria is not hemodynamically significant This study was analyzed by the Viz. ai algorithm.      Ct Brain Perfusion    Result Date: 2021  Patient MRN: [id number] : 1955 Age:  73 years Gender: Female Order Date: 2021 Exam: CT BRAIN PERFUSION Number of Images: 788 views Indication:   cva cva Comparison: None. Findings: Perfusion images demonstrate asymmetric blood volume Blood flow images demonstrate symmetric blood flow There is no significant ischemic penumbra identified. There is significant core infarct identified. There is evidence for core infarct in the right frontal lobe likely old, no acute infarct or ischemia identified This study was analyzed by the Viz. ai algorithm. Cta Head W Contrast    Result Date: 2021  Patient MRN:  [id number] : 1955 Age: 73 years Gender: Female Order Date:  2021 EXAM: CTA NECK W CONTRAST, CTA HEAD W CONTRAST NUMBER OF IMAGES:  1453 INDICATION:  cva cva COMPARISON: None Technique: Low-dose CT  acquisition technique included one of following options; 1 . Automated exposure control, 2. Adjustment of MA and or KV according to patient's size or 3. Use of iterative reconstruction. Contiguous spiral images were obtained in the axial plane, following the administration of intravenous contrast using CT angiographic protocol. Sagittal and coronal images were reconstructed from the axial plane acquisition. Additional MIP reconstructions were presented to aid in the interpretation of this study. Images were obtained from the skull base cranially. There is mild calcified plaque identified in the vessels compatible with atherosclerotic disease. There is aortic arch and great vessels demonstrate mild atherosclerotic disease. The right carotid is unremarkable. The left carotid is unremarkable. The right vertebral artery is unremarkable. The left vertebral artery is unremarkable. The basilar artery is unremarkable. The middle cerebral arteries are unremarkable. The anterior cerebral arteries are unremarkable. The posterior cerebral arteries are unremarkable.      1. thrombosis in the left lower extremity involving the posterior tibial vein There is otherwise good compressibility, there is good augmentation, there is good color flow. There is evidence for deep venous thrombosis in the left posterior tibial vein ALERT:  THIS IS AN ABNORMAL REPORT     +++++++++++++++++++++++++++++++++++++++++++++++++  [facility] Physician - [address] [place] [address]  +++++++++++++++++++++++++++++++++++++++++++++++++  NOTE: This report was transcribed using voice recognition software. Every effort was made to ensure accuracy; however, inadvertent computerized transcription errors may be present.

## 2021-05-02 NOTE — PLAN OF CARE
Embolic RMCA stroke 2/2 paradoxical embolus from DVT with + PFO with R to L shunting on ADAM   Anticoagulation has been started and ASA d/c    Continue Eliquis and statin   Will send thrombophilia panel   Recommend OP hematology f/u for unprovoked DVT  Stroke clinic follow up     Okay to d/c per neuro-- please call with questions or new issues     Lamonte Cobos PA-C

## 2021-05-02 NOTE — PLAN OF CARE
Problem: Skin Integrity:  Goal: Will show no infection signs and symptoms  Description: Will show no infection signs and symptoms  5/2/2021 1859 by Sylvia Resendiz RN  Outcome: Met This Shift     Problem: Skin Integrity:  Goal: Absence of new skin breakdown  Description: Absence of new skin breakdown  5/2/2021 1859 by Sylvia Resendiz RN  Outcome: Met This Shift     Problem: Falls - Risk of:  Goal: Will remain free from falls  Description: Will remain free from falls  5/2/2021 1859 by Sylvia Resendiz RN  Outcome: Met This Shift     Problem: Falls - Risk of:  Goal: Absence of physical injury  Description: Absence of physical injury  5/2/2021 1859 by Sylvia Resendiz RN  Outcome: Met This Shift     Problem: Confusion - Acute:  Goal: Absence of continued neurological deterioration signs and symptoms  Description: Absence of continued neurological deterioration signs and symptoms  Outcome: Ongoing  Goal: Mental status will be restored to baseline  Description: Mental status will be restored to baseline  Outcome: Ongoing     Problem: Discharge Planning:  Goal: Ability to perform activities of daily living will improve  Description: Ability to perform activities of daily living will improve  Outcome: Met This Shift  Goal: Participates in care planning  Description: Participates in care planning  Outcome: Ongoing     Problem: Injury - Risk of, Physical Injury:  Goal: Will remain free from falls  Description: Will remain free from falls  5/2/2021 1859 by Sylvia Resendiz RN  Outcome: Met This Shift     Problem: Injury - Risk of, Physical Injury:  Goal: Absence of physical injury  Description: Absence of physical injury  5/2/2021 1859 by Sylvia Resendiz RN  Outcome: Met This Shift     Problem: Mood - Altered:  Goal: Mood stable  Description: Mood stable  Outcome: Met This Shift  Goal: Absence of abusive behavior  Description: Absence of abusive behavior  Outcome: Met This Shift  Goal: Verbalizations of feeling emotionally comfortable while being cared for will increase  Description: Verbalizations of feeling emotionally comfortable while being cared for will increase  Outcome: Met This Shift

## 2021-05-02 NOTE — PROGRESS NOTES
extremities  Edema:  unremarkable    Vitals:  Spo2 monitored throughout:  Weaned to RA at rest 97%  RA ambulation 96%  RA seated rest post ambulation 99%    Patient education  Pt educated on role of PT intervention. Pt educated on safety in room with utilization of call light for assistance with mobility. Pt educated on importance of maximizing OOB time by transferring to bedside chair for meals and ambulating to bathroom/transferring to bedside commode with assistance from nursing and therapy staff to increase functional activity tolerance and overall functional independence. Reinforced importance of consciously attending to L side. Patient response to education:   Pt verbalized understanding Pt demonstrated skill Pt requires further education in this area   yes yes yes     ASSESSMENT:    Comments:  RN cleared pt for activity prior to session. Pt received supine in bed and agreeable to PT intervention at this time. Pt performed all functional mobility as noted above. Pt continues to present with significant L sided inattention but overall strength and balance continue to improve. Pt returned to supine at end of session and left with all needs met and call light in reach. Pt requires continued skilled PT intervention for the purposes of maximizing functional mobility and independence by addressing deficits described above. Treatment:  Patient practiced and was instructed in the following treatment:     Therapeutic Activities Completed:   o Functional mobility as noted above:   - Bed mobility: SBA with bed rail. Min VC and hand over hand guidance to facilitate efficient use of BUE on bed rail to promote more independent completion of task. - Transfer training: STS jacque from EOB. Cues for proper hand placement and sequencing. Pt inattentive to L side during transfers requiring frequent VC and TC for hand placement on [employer].    - Ambulation: 75 feet with front [employer] Jacque x 2 reps.   Standing rest break between bouts. Cues throughout for attention to L side and avoiding obstacles on L side. Pt continuously forgets to attend to L side.    o Skilled repositioning in supine with HOB elevated for comfort.  o Pt education as noted above. PLAN:    Patient is making fair progress towards established goals. Will continue with current POC.       Time in  0835  Time out  0850    Total Treatment Time  15 minutes     CPT codes:  [] Gait training 16776 0 minutes  [] Manual therapy 29856 0 minutes  [x] Therapeutic activities 01626 15 minutes  [] Therapeutic exercises 59029 0 minutes  [] Neuromuscular reeducation 90465 0 minutes    Chelsi Chery, PT, DPT  [id number]

## 2021-05-02 NOTE — PROGRESS NOTES
Nurse to nurse called to [phone]. Call placed to son Donita Ramirez, no answer, left message giving new room #, and new unit phone extension.

## 2021-05-02 NOTE — PROGRESS NOTES
OT BEDSIDE TREATMENT NOTE      Date:2021  Patient Name: Israel Reed  MRN: [id number]  : 1955  Room: [address] [place] [address]-A     Per OT Eval:    Evaluating [address] [facility], OTR/L #[id number]     AM-PAC Daily Activity Raw Score:   Recommended Adaptive Equipment: TBD      Diagnosis: Encephalopathy acute [G93.40]     Modified [place] Scale (MRS)  Score     Description  0             No symptoms  1             No significant disability despite symptoms  2             Slight disability; able to look after own affairs  3             Moderate disability; able to ambulate without assist/ requires assist with ADLs  4             Moderate/Severe disability;requires assist to ambulate/assist with ADLs  5             Severe disability;bedridden/incontinent   6               Score:  4     Referring physician: Alejandro Quiros MD     Pertinent Medical History: chronic back pain, headache, HTN, OA, tobacco abuse     Precautions:  Falls, TSM, L inattention, bed alarm     Home Living: Pt lives with son in 2 floor home. 4 GABRIEL, 1 handrail  Bath and bedroom on 2nd floor - flight of stairs   Bathroom setup: tub only  Equipment owned: n/a     Prior Level of Function: independent with ADLs , shares IADLs; ambulated independently w/o AD  Driving: no     Pain Level: Pt c/o no pain this session      Cognition: A&O: 3/4 Follows 1 step directions, slight delay with tasks, easily distracted, impulsive at times, pleasant & cooperative               Memory:  fair+              Sequencing:  fair               Problem solving:  fair               Judgement/safety:  fair                 Functional Assessment:    Initial Eval Status  Date: 21 Treatment Status  Date:  21 Short Term Goals/LTG  Treatment frequency: 1-4x/wk   Feeding Stand by Assist  Set up Modified [place]    Grooming Minimal Assist   Standing w/ w/w Min A   To wash hands standing at sink.  Assist required due to left inattention  Modified [place]    UB Dressing Minimal Assist  SBA  Doff/danika gown seated in EOB Modified [place]    LB Dressing Moderate Assist   Simulated pants     Improved to min A to don/doff B socks  Min A  To don underwear seated EOB and standing top pull over hips  Stand by Assist    Bathing Moderate Assist  Min A  Per last tx Stand by Assist    Toileting Minimal Assist  [facility]  CGA for clothing management. SBA for hygiene  Supervision    Bed Mobility  NT SBA- supine<->sit  Educated pt on technique to increase independence. Rolling: Independent   Supine to sit: Modified [place]   Sit to supine: Modified [place]    Functional Transfers Mod A Min A  Cues for hand placement & technique   SBA   Functional Mobility Mod A w/ w/  Light mobility in room to prep for ADL's  Min A  Home distance using w/w in preparation of bathroom ADL's. Min A find bathroom and avoid obstacles due to left side inattention. SBA   Balance Sitting: SBA  Standing: Min><Mod A w/ w/w  Sitting: Ind  Standing: CGA       Activity Tolerance Fair Fair+ Good   Visual/  Perceptual Glasses: no  L inattention noted during functional ambulation and ADL's. Frequent cues to attend to left required                        Comments: Upon arrival pt was in bed & agreeable for therapy. Pt educated on techniques to increase independence and safety during ADL's, bed mobility, and functional transfers. Educated pt on LUE activities to complete to increase [address] and [address] to increase independence with ADL's. Pt educated on importance of left side scanning to increase safety and be better oriented to surroundings. At end of session pt was seated upright in bed all lines and tubes intact, call light within reach. TSM present. · Pt has made good progress towards set goals.    · Continue with current plan of care    Treatment Time In: 8:35           Treatment Time Out: 8:50          Treatment Charges: Mins Units   Ther Ex  [id number]     Manual Therapy Roxi Livingston [phone] [id number]   ADL/Home [facility] [id number]     Neuro Re-ed [id number]     Group Therapy      Orthotic manage/training  [id number]     Non-Billable Time     Total Timed Treatment 15 [id number] [place] [id number]

## 2021-05-03 PROBLEM — I82.462 ACUTE DEEP VEIN THROMBOSIS (DVT) OF CALF MUSCLE VEIN OF LEFT LOWER EXTREMITY (HCC): Status: ACTIVE | Noted: 2021-05-03

## 2021-05-03 PROBLEM — Q21.12 PFO (PATENT FORAMEN OVALE): Status: ACTIVE | Noted: 2021-05-03

## 2021-05-03 LAB
ALBUMIN SERPL-MCNC: 3.4 G/DL (ref 3.5–5.2)
ALP BLD-CCNC: 84 U/L (ref 35–104)
ALT SERPL-CCNC: 5 U/L (ref 0–32)
ANION GAP SERPL CALCULATED.3IONS-SCNC: 10 MMOL/L (ref 7–16)
ANISOCYTOSIS: ABNORMAL
AST SERPL-CCNC: 10 U/L (ref 0–31)
BASOPHILS ABSOLUTE: 0.04 E9/L (ref 0–0.2)
BASOPHILS RELATIVE PERCENT: 0.5 % (ref 0–2)
BILIRUB SERPL-MCNC: 0.2 MG/DL (ref 0–1.2)
BUN BLDV-MCNC: 16 MG/DL (ref 6–23)
BURR CELLS: ABNORMAL
CALCIUM SERPL-MCNC: 9 MG/DL (ref 8.6–10.2)
CHLORIDE BLD-SCNC: 101 MMOL/L (ref 98–107)
CO2: 24 MMOL/L (ref 22–29)
CREAT SERPL-MCNC: 0.8 MG/DL (ref 0.5–1)
EOSINOPHILS ABSOLUTE: 0.35 E9/L (ref 0.05–0.5)
EOSINOPHILS RELATIVE PERCENT: 4.2 % (ref 0–6)
GFR AFRICAN AMERICAN: >60
GFR NON-AFRICAN AMERICAN: >60 ML/MIN/1.73
GLUCOSE BLD-MCNC: 94 MG/DL (ref 74–99)
HCT VFR BLD CALC: 29.3 % (ref 34–48)
HEMOGLOBIN: 8.6 G/DL (ref 11.5–15.5)
IMMATURE GRANULOCYTES #: 0.03 E9/L
IMMATURE GRANULOCYTES %: 0.4 % (ref 0–5)
LYMPHOCYTES ABSOLUTE: 1.8 E9/L (ref 1.5–4)
LYMPHOCYTES RELATIVE PERCENT: 21.7 % (ref 20–42)
MCH RBC QN AUTO: 21.2 PG (ref 26–35)
MCHC RBC AUTO-ENTMCNC: 29.4 % (ref 32–34.5)
MCV RBC AUTO: 72.2 FL (ref 80–99.9)
MONOCYTES ABSOLUTE: 0.55 E9/L (ref 0.1–0.95)
MONOCYTES RELATIVE PERCENT: 6.6 % (ref 2–12)
NEUTROPHILS ABSOLUTE: 5.52 E9/L (ref 1.8–7.3)
NEUTROPHILS RELATIVE PERCENT: 66.6 % (ref 43–80)
OVALOCYTES: ABNORMAL
PDW BLD-RTO: 20.4 FL (ref 11.5–15)
PLATELET # BLD: 299 E9/L (ref 130–450)
PMV BLD AUTO: 8.9 FL (ref 7–12)
POIKILOCYTES: ABNORMAL
POLYCHROMASIA: ABNORMAL
POTASSIUM REFLEX MAGNESIUM: 3.9 MMOL/L (ref 3.5–5)
RBC # BLD: 4.06 E12/L (ref 3.5–5.5)
SCHISTOCYTES: ABNORMAL
SODIUM BLD-SCNC: 135 MMOL/L (ref 132–146)
TOTAL PROTEIN: 6.5 G/DL (ref 6.4–8.3)
WBC # BLD: 8.3 E9/L (ref 4.5–11.5)

## 2021-05-03 PROCEDURE — 97535 SELF CARE MNGMENT TRAINING: CPT

## 2021-05-03 PROCEDURE — 2580000003 HC RX 258: Performed by: FAMILY MEDICINE

## 2021-05-03 PROCEDURE — 85025 COMPLETE CBC W/AUTO DIFF WBC: CPT

## 2021-05-03 PROCEDURE — 36415 COLL VENOUS BLD VENIPUNCTURE: CPT

## 2021-05-03 PROCEDURE — 6370000000 HC RX 637 (ALT 250 FOR IP): Performed by: STUDENT IN AN ORGANIZED HEALTH CARE EDUCATION/TRAINING PROGRAM

## 2021-05-03 PROCEDURE — 1200000000 HC SEMI PRIVATE

## 2021-05-03 PROCEDURE — 6370000000 HC RX 637 (ALT 250 FOR IP): Performed by: CLINICAL NURSE SPECIALIST

## 2021-05-03 PROCEDURE — 80053 COMPREHEN METABOLIC PANEL: CPT

## 2021-05-03 PROCEDURE — 6370000000 HC RX 637 (ALT 250 FOR IP): Performed by: FAMILY MEDICINE

## 2021-05-03 PROCEDURE — 97530 THERAPEUTIC ACTIVITIES: CPT

## 2021-05-03 RX ORDER — ATORVASTATIN CALCIUM 40 MG/1
40 TABLET, FILM COATED ORAL NIGHTLY
Qty: 30 TABLET | Refills: 3 | Status: SHIPPED | OUTPATIENT
Start: 2021-05-03 | End: 2021-07-14 | Stop reason: SDUPTHER

## 2021-05-03 RX ORDER — CLOPIDOGREL BISULFATE 75 MG/1
75 TABLET ORAL DAILY
Qty: 30 TABLET | Refills: 3 | Status: SHIPPED | OUTPATIENT
Start: 2021-05-04 | End: 2021-07-14 | Stop reason: SDUPTHER

## 2021-05-03 RX ORDER — LISINOPRIL 5 MG/1
5 TABLET ORAL DAILY
Qty: 30 TABLET | Refills: 3 | Status: SHIPPED | OUTPATIENT
Start: 2021-05-04 | End: 2021-07-14 | Stop reason: SDUPTHER

## 2021-05-03 RX ADMIN — Medication 10 ML: at 09:23

## 2021-05-03 RX ADMIN — CLOPIDOGREL 75 MG: 75 TABLET, FILM COATED ORAL at 09:21

## 2021-05-03 RX ADMIN — Medication 10 ML: at 21:25

## 2021-05-03 RX ADMIN — ATORVASTATIN CALCIUM 40 MG: 40 TABLET, FILM COATED ORAL at 21:24

## 2021-05-03 RX ADMIN — APIXABAN 10 MG: 5 TABLET, FILM COATED ORAL at 09:21

## 2021-05-03 RX ADMIN — LISINOPRIL 5 MG: 10 TABLET ORAL at 09:20

## 2021-05-03 RX ADMIN — APIXABAN 10 MG: 5 TABLET, FILM COATED ORAL at 21:24

## 2021-05-03 ASSESSMENT — PAIN SCALES - GENERAL: PAINLEVEL_OUTOF10: 0

## 2021-05-03 NOTE — PROGRESS NOTES
CLINICAL PHARMACY NOTE: MEDS TO [address] Select Patient?: No  Total # of Prescriptions Filled: 4   The following medications were delivered to the patient:  · eliquis 5mg  · Lisinopril 5mg  · Atorvastatin 40mg  · Clopidogrel 75mg  Total # of Interventions Completed: 3  Time Spent (min): 45    Additional Documentation:  Delivered to patient

## 2021-05-03 NOTE — PLAN OF CARE
Problem: Skin Integrity:  Goal: Will show no infection signs and symptoms  Description: Will show no infection signs and symptoms  5/3/2021 0227 by Rekha Burnett RN  Outcome: Met This Shift  5/2/2021 1859 by Irasema De Leon RN  Outcome: Met This Shift  Goal: Absence of new skin breakdown  Description: Absence of new skin breakdown  5/3/2021 0227 by Rekha Burnett RN  Outcome: Met This Shift  5/2/2021 1859 by Irasema De Leon RN  Outcome: Met This Shift     Problem: Falls - Risk of:  Goal: Will remain free from falls  Description: Will remain free from falls  5/3/2021 0227 by Rekha Burnett RN  Outcome: Met This Shift  5/2/2021 1859 by Irasema De Leon RN  Outcome: Met This Shift  Goal: Absence of physical injury  Description: Absence of physical injury  5/3/2021 0227 by Rekha Burnett RN  Outcome: Met This Shift  5/2/2021 1859 by Irasema De Leon RN  Outcome: Met This Shift     Problem: Discharge Planning:  Goal: Ability to perform activities of daily living will improve  Description: Ability to perform activities of daily living will improve  5/2/2021 1859 by Irasema De Leon RN  Outcome: Met This Shift     Problem: Injury - Risk of, Physical Injury:  Goal: Will remain free from falls  Description: Will remain free from falls  5/3/2021 0227 by Rekha Burnett RN  Outcome: Met This Shift  5/2/2021 1859 by Irasema De Leon RN  Outcome: Met This Shift  Goal: Absence of physical injury  Description: Absence of physical injury  5/3/2021 0227 by Rekha Burnett RN  Outcome: Met This Shift  5/2/2021 1859 by Irasema De Leon RN  Outcome: Met This Shift     Problem: Mood - Altered:  Goal: Absence of abusive behavior  Description: Absence of abusive behavior  5/3/2021 0227 by Rekha Burnett RN  Outcome: Met This Shift  5/2/2021 1859 by Irasema De Leon RN  Outcome: Met This Shift  Goal: Verbalizations of feeling emotionally comfortable while being cared for will increase  Description: Verbalizations of feeling

## 2021-05-03 NOTE — DISCHARGE SUMMARY
Hospitalist Discharge Summary    Patient ID: Cammie Krishna   Patient : 1955  Patient's PCP: Dre Wang MD (Inactive)    Admit Date: 2021   Admitting Physician: Teodoro Melendez MD    Discharge Date:  5/3/2021   Discharge Physician: Yaneth Knox MD   Discharge Condition: Stable  Discharge Disposition: [address] [facility] course in brief:  (Please refer to daily progress notes for a comprehensive review of the hospitalization by requesting medical records)    HPI per Dr. Candace Polk: 73 y.o. female with past medical history of CVA HLD   . She present to the ED due to  altered mental status . Patient was with family at 10 pm sitting on the couch and suddenly became unresponsive . She started to have slurred speech . In the ED blood sugar was 222 . Patient was not hypoxic in ED Patient responds  to pain but is able to open her eyes but does not speak . History is limited because patient is nonverbal. ED course revealed trop <0.01  creatinine 1.2 lactic acid 1.5 CT perfusion revealed no acute infarct . CTA Head /neck revealed no signficant stenosis     Hospital Course:    ASSESSMENT:  Acute Encephalopathy  Acute Ischemic CVA  Paradoxical Embolism  KEE  Lactic Acidosis  Hyperglycemia  Microcytic Anemia  Acute Left Lower Extremity DVT     PLAN:  - CTH on admission negative for acute intracranial pathology  - CTA Head and Neck with mild atherosclerotic disease. No large vessel occlusion  - CT brain perfusion with there is evidence for core infarct in the right frontal lobe likely old, no acute infarct or ischemia identified  - MRI brain without contrast showed findings consistent with acute infarct in the right middle cerebral artery territory involving the right parietal lobe, posterior temporal lobe and insular cortex. - Neurology c/s noted and appreciated; following  - ADAM report on 21 with Normal LV function. Normal RV function. No hemodynamically significant valve disease.  No intracardiac mass or thrombus. Small PFO noted with an aneurysmal atrial septum. Right to left shunting of few bubbles on agitated saline study.  - B/L LE with acute LLE DVT  - Suspect paradoxical embolism  - Pt for outpatient event monitor  - Cont on Eliquis load x 7 days followed by maintenance thereafter  - Cont with Plavix and high intensity statin  - Stop ASA  - Hypercoagulable work-up to be followed-up as outpatient  - Neurology c/s noted and appreciated; outpatient follow-up  - Outpatient hematology/PCP for hypercoaguable work-up  - Encourage PO hydration. Renal function WNL. Patient seen and examined at bedside in NAD. Pt without gross focal deficits. No acute events overnight. Mentation with continued improvement. Pt is hemodynamically stable for discharge.      Consults:   IP CONSULT TO INTERNAL MEDICINE  IP CONSULT TO NEUROLOGY  IP CONSULT TO PHYSICAL MEDICINE REHAB    Discharge Diagnoses:    Acute Encephalopathy  Acute Ischemic CVA  Paradoxical Embolism  KEE  Lactic Acidosis  Hyperglycemia  Microcytic Anemia  Acute Left Lower Extremity DVT      Discharge Instructions / Follow up:    Neurology  Hematology  PCP    Continued appropriate risk factor modification of blood pressure, diabetes and serum lipids will remain essential to reducing risk of future atherosclerotic development    Activity: activity as tolerated    Significant labs:  CBC:   Recent Labs     05/01/21 0906 05/02/21 0454 05/03/21  0551   WBC 7.5 7.8 8.3   RBC 4.45 4.20 4.06   HGB 9.3* 8.6* 8.6*   HCT 32.4* 30.2* 29.3*   MCV 72.8* 71.9* 72.2*   RDW 20.3* 20.3* 20.4*    304 299     BMP:   Recent Labs     05/01/21 0906 05/02/21 0454 05/03/21  0551    137 135   K 3.7 4.0 3.9    102 101   CO2 25 24 24   BUN 14 16 16   CREATININE 0.9 0.9 0.8     LFT:  Recent Labs     05/01/21  0906 05/02/21  0454 05/03/21  0551   PROT 6.9 6.4 6.5   ALKPHOS 93 84 84   ALT 5 6 5   AST 11 10 10   BILITOT 0.3 0.3 0.2     PT/INR: No results for reconstruction, and/or weight based adjustment of the mA/kV was utilized to reduce the radiation dose to as low as reasonably achievable. COMPARISON: None. HISTORY: ORDERING SYSTEM PROVIDED HISTORY: cva TECHNOLOGIST PROVIDED HISTORY: Reason for exam:->cva Has a \"code stroke\" or \"stroke alert\" been called? ->Yes Decision Support Exception->Emergency Medical Condition (MA) FINDINGS: There is no acute infarction, intracranial hemorrhage or intracranial mass lesion. No mass effect, midline shift or extra-axial collection is noted. There are moderate nonspecific foci of periventricular and subcortical cerebral white matter hypodensity, most likely representing chronic microangiopathic disease in this age group. Chronic encephalomalacia involving right medial occipital lobe and anterior aspect of the right superior frontal gyrus and chronic encephalomalacia of bilateral corona radiata and bilateral thalamus and bilateral basal ganglia. The brain parenchyma is otherwise normal. The cerebellar tonsils are in normal position. The ventricles, sulci, and cisterns are mildly prominent suggestive of mild generalized volume loss. The globes and orbits are within normal limits. The visualized extracranial structures including paranasal sinuses and mastoid air cells are unremarkable. No fracture is identified. 1.  No evidence for acute intracranial hemorrhage, territorial infarction or intracranial mass lesion. However considering the extensive amount of white matter changes and extensive prior areas of infarction, brain MRI is more sensitive for detection of subtle foci of acute infarction. 2.  Chronic encephalomalacia involving right medial occipital lobe and anterior aspect of the right superior frontal gyrus and chronic encephalomalacia of bilateral corona radiata and bilateral thalamus and bilateral basal ganglia. 3.  Moderate chronic microangiopathic ischemic disease. Mild generalized volume loss.      Xr Chest Portable    Result Date: 2021  EXAMINATION: ONE XRAY VIEW OF THE CHEST 2021 11:37 pm COMPARISON: 2010 HISTORY: ORDERING SYSTEM PROVIDED HISTORY: altered mental status TECHNOLOGIST PROVIDED HISTORY: Reason for exam:->altered mental status What reading provider will be dictating this exam?->CRC FINDINGS: The lungs are without acute focal process. Interstitial prominence, likely chronic. There is no effusion or pneumothorax. The cardiomediastinal silhouette is without acute process. The osseous structures are without acute process. No acute process. Interstitial prominence, likely chronic. Cta Neck W Contrast    Result Date: 2021  Patient MRN:  [id number] : 1955 Age: 73 years Gender: Female Order Date:  2021 EXAM: CTA NECK W CONTRAST, CTA HEAD W CONTRAST NUMBER OF IMAGES:  1453 INDICATION:  cva cva COMPARISON: None Technique: Low-dose CT  acquisition technique included one of following options; 1 . Automated exposure control, 2. Adjustment of MA and or KV according to patient's size or 3. Use of iterative reconstruction. Contiguous spiral images were obtained in the axial plane, following the administration of intravenous contrast using CT angiographic protocol. Sagittal and coronal images were reconstructed from the axial plane acquisition. Additional MIP reconstructions were presented to aid in the interpretation of this study. Images were obtained from the skull base cranially. There is mild calcified plaque identified in the vessels compatible with atherosclerotic disease. There is aortic arch and great vessels demonstrate mild atherosclerotic disease. The right carotid is unremarkable. The left carotid is unremarkable. The right vertebral artery is unremarkable. The left vertebral artery is unremarkable. The basilar artery is unremarkable. The middle cerebral arteries are unremarkable. The anterior cerebral arteries are unremarkable.  The posterior cerebral arteries are unremarkable. 1.  Mild atherosclerotic disease . No large vessel occlusion of the 2. Estimated stenosis of the proximal right and left internal carotid artery by NASCET criteria is not hemodynamically significant This study was analyzed by the [employer] ai algorithm. Ct Brain Perfusion    Result Date: 2021  Patient MRN: [id number] : 1955 Age:  73 years Gender: Female Order Date: 2021 Exam: CT BRAIN PERFUSION Number of Images: 422 views Indication:   cva cva Comparison: None. Findings: Perfusion images demonstrate asymmetric blood volume Blood flow images demonstrate symmetric blood flow There is no significant ischemic penumbra identified. There is significant core infarct identified. There is evidence for core infarct in the right frontal lobe likely old, no acute infarct or ischemia identified This study was analyzed by the [employer] ai algorithm. Cta Head W Contrast    Result Date: 2021  Patient MRN:  [id number] : 1955 Age: 73 years Gender: Female Order Date:  2021 EXAM: CTA NECK W CONTRAST, CTA HEAD W CONTRAST NUMBER OF IMAGES:  1453 INDICATION:  cva cva COMPARISON: None Technique: Low-dose CT  acquisition technique included one of following options; 1 . Automated exposure control, 2. Adjustment of MA and or KV according to patient's size or 3. Use of iterative reconstruction. Contiguous spiral images were obtained in the axial plane, following the administration of intravenous contrast using CT angiographic protocol. Sagittal and coronal images were reconstructed from the axial plane acquisition. Additional MIP reconstructions were presented to aid in the interpretation of this study. Images were obtained from the skull base cranially. There is mild calcified plaque identified in the vessels compatible with atherosclerotic disease. There is aortic arch and great vessels demonstrate mild atherosclerotic disease. The right carotid is unremarkable.  The left carotid is unremarkable. The right vertebral artery is unremarkable. The left vertebral artery is unremarkable. The basilar artery is unremarkable. The middle cerebral arteries are unremarkable. The anterior cerebral arteries are unremarkable. The posterior cerebral arteries are unremarkable. 1.  Mild atherosclerotic disease . No large vessel occlusion of the 2. Estimated stenosis of the proximal right and left internal carotid artery by NASCET criteria is not hemodynamically significant This study was analyzed by the [employer] ai algorithm. Mri Brain Without Contrast    Result Date: 4/28/2021  EXAMINATION: MRI OF THE BRAIN WITHOUT CONTRAST  4/28/2021 8:46 am TECHNIQUE: Multiplanar multisequence MRI of the brain was performed without the administration of intravenous contrast. COMPARISON: Head CT dated 04/26/2021 HISTORY: ORDERING SYSTEM PROVIDED HISTORY: AMS TECHNOLOGIST PROVIDED HISTORY: Reason for exam:->AMS What reading provider will be dictating this exam?->CRC FINDINGS: The patient was uncooperative and only axial diffusion-weighted images could be done. These images are also degraded by motion artifact. There is a large area of restricted diffusion, consistent with acute ischemia, in the right parietal lobe and also involving the posterior temporal lobe and posterior insular cortex. There is an old right frontal infarct. INTRACRANIAL STRUCTURES/VENTRICLES: There is no acute infarct. No mass effect or midline shift. No evidence of an acute intracranial hemorrhage. The ventricles and sulci are normal in size and configuration. The sellar/suprasellar regions appear unremarkable. The normal signal voids within the major intracranial vessels appear maintained. Findings consistent with acute infarct in the right middle cerebral artery territory involving the right parietal lobe, posterior temporal lobe and insular cortex.      Us Dup Lower Extremities Bilateral Venous    Result Date: 5/1/2021  Patient MRN: [id number] : 1955 Age: 73 years Gender: Female Order Date:  2021 11:29 AM EXAM: US DUP LOWER EXTREMITIES BILATERAL VENOUS NUMBER OF IMAGES:  61 INDICATION:  r/o DVT r/o DVT What reading provider will be dictating this exam?->[facility] COMPARISON: None There is evidence for deep venous thrombosis in the left lower extremity involving the posterior tibial vein There is otherwise good compressibility, there is good augmentation, there is good color flow. There is evidence for deep venous thrombosis in the left posterior tibial vein ALERT:  THIS IS AN ABNORMAL REPORT       Discharge Medications:      Medication List      START taking these medications    * apixaban 5 MG Tabs tablet  Commonly known as: ELIQUIS  Take 2 tablets by mouth 2 times daily for 6 days     * apixaban 5 MG Tabs tablet  Commonly known as: ELIQUIS  Take 1 tablet by mouth 2 times daily  Start taking on: May 8, 2021     atorvastatin 40 MG tablet  Commonly known as: LIPITOR  Take 1 tablet by mouth nightly     clopidogrel 75 MG tablet  Commonly known as: PLAVIX  Take 1 tablet by mouth daily  Start taking on: May 4, 2021     lisinopril 5 MG tablet  Commonly known as: PRINIVIL;ZESTRIL  Take 1 tablet by mouth daily  Start taking on: May 4, 2021         * This list has 2 medication(s) that are the same as other medications prescribed for you. Read the directions carefully, and ask your doctor or other care provider to review them with you.             STOP taking these medications    aspirin 81 MG chewable tablet           Where to Get Your Medications      These medications were sent to [facility] [address] [address]    Phone: [phone]   · apixaban 5 MG Tabs tablet  · apixaban 5 MG Tabs tablet  · atorvastatin 40 MG tablet  · clopidogrel 75 MG tablet  · lisinopril 5 MG tablet       Time Spent on discharge is more than 45 minutes in the examination, evaluation, counseling and review of medications and discharge plan.    +++++++++++++++++++++++++++++++++++++++++++++++++  Vicki Ambrosio MD  [address] [place] [address]  +++++++++++++++++++++++++++++++++++++++++++++++++  NOTE: This report was transcribed using voice recognition software. Every effort was made to ensure accuracy; however, inadvertent computerized transcription errors may be present.

## 2021-05-03 NOTE — CARE COORDINATION
5/3/2021 - Admitted for embolic stroke. PM&R consult done. ARU following and awaiting precert for pt to go to ARU. Plan is for pt to discharge to ARU when medically stable. SW/CM will follow.

## 2021-05-03 NOTE — PLAN OF CARE
Problem: Skin Integrity:  Goal: Will show no infection signs and symptoms  Description: Will show no infection signs and symptoms  5/3/2021 1106 by Lexi Campoverde RN  Outcome: Met This Shift     Problem: Skin Integrity:  Goal: Absence of new skin breakdown  Description: Absence of new skin breakdown  5/3/2021 1106 by Lexi Campoverde RN  Outcome: Met This Shift     Problem: Falls - Risk of:  Goal: Will remain free from falls  Description: Will remain free from falls  5/3/2021 1106 by Lexi Campoverde RN  Outcome: Met This Shift     Problem: Injury - Risk of, Physical Injury:  Goal: Will remain free from falls  Description: Will remain free from falls  5/3/2021 1106 by Lexi Campoverde RN  Outcome: Met This Shift     Problem: Mood - Altered:  Goal: Mood stable  Description: Mood stable  5/3/2021 1106 by Lexi Campoverde RN  Outcome: Met This Shift     Problem: Mood - Altered:  Goal: Absence of abusive behavior  Description: Absence of abusive behavior  5/3/2021 1106 by Lexi Campoverde RN  Outcome: Met This Shift     Problem: Sleep Pattern Disturbance:  Goal: Appears well-rested  Description: Appears well-rested  Outcome: Met This Shift

## 2021-05-03 NOTE — PROGRESS NOTES
OT BEDSIDE TREATMENT NOTE      Date:5/3/2021  Patient Name: Anupama Che  MRN: [id number]  : 1955  Room: [address] [place] [address]A     Per OT Eval:    Evaluating [facility], OTR/L #[id number]     AM-PAC Daily Activity Raw Score:   Recommended Adaptive Equipment: TBD      Diagnosis: Encephalopathy acute [G93.40]     Modified Barrie Scale (MRS)  Score     Description  0             No symptoms  1             No significant disability despite symptoms  2             Slight disability; able to look after own affairs  3             Moderate disability; able to ambulate without assist/ requires assist with ADLs  4             Moderate/Severe disability;requires assist to ambulate/assist with ADLs  5             Severe disability;bedridden/incontinent   6               Score:  4     Referring physician: Silvia Palumbo MD     Pertinent Medical History: chronic back pain, headache, HTN, OA, tobacco abuse     Precautions:  Falls, TSM, L inattention, bed alarm     Home Living: Pt lives with son in 2 floor home.  4 GABRIEL, 1 handrail  Bath and bedroom on 2nd floor - flight of stairs   Bathroom setup: tub only  Equipment owned: n/a     Prior Level of Function: independent with ADLs , shares IADLs; ambulated independently w/o AD  Driving: no     Pain Level: Pt did not complain of pain this session     Cognition: A&O: 3/4 Follows 1 step directions, slight delay with tasks, easily distracted, impulsive at times, pleasant & cooperative               Memory:  fair+              Sequencing:  fair               Problem solving:  fair               Judgement/safety:  fair                 Functional Assessment:    Initial Eval Status  Date: 21 Treatment Status  Date:  5/3/21 Short Term Goals/LTG  Treatment frequency: 1-4x/wk   Feeding Stand by Assist  Set up   Modified [place]    Grooming Minimal Assist   Standing w/ w/w SBA/CGA  Pt washed face, applied deodorant, combed hair, put hair in ponytail, brushed seated in chair at sink    CGA to stand and wash hands at sink Modified [place]    UB Dressing Minimal Assist  SBA  Doff/danika gown seated upright in chair Modified [place]    LB Dressing Moderate Assist   Simulated pants     Improved to min A to don/doff B socks CGA  Pt donned/doffed hospital socks seated using cross over technique    Pt donned brief using cross over technique with CGA to stand and pull over hips Stand by Assist    Bathing Moderate Assist  Arely  Pt completed sponge bathing task seated/standing, with pt able to wash of UB, LB with assistance to stand and wash of buttocks/perlita area thorughouly Stand by Assist    Toileting Minimal Assist   CGA  Pt completed toileting task on standard commode, with pt able to complete of hygiene to perlita area, clothing management and transfer with use of grab bar Supervision    Bed Mobility  NT SBA  Supine<>EOB    Use of bed rail   Rolling: Independent   Supine to sit: Modified [place]   Sit to supine: Modified [place]    Functional Transfers Mod A Arely/CGA  Sit to Stand  Stand to Sit    Cueing for hand placement   SBA   Functional Mobility Mod A w/ w/  Light mobility in room to prep for ADL's Arely  Pt ambulated short household distance in room EOB<>Bathroom with w.w, cueing for safety and assistance with walker management due to L inattention SBA   Balance Sitting: SBA  Standing: Min><Mod A w/ w/w Sitting Supported:  Independent    Standing:  CGA/Arely       Activity Tolerance Fair Fair+ Good   Visual/  Perceptual Glasses: no  L inattention noted during functional ambulation and ADL's. Frequent cues to attend to left required                        Comments: Upon arrival pt supine in bed, agreeable to therapy, speaking [facility] nursing okaying pt to be seen this session. Pt completed of bed mobility, functional mobility, transfers and ADL tasks this session.  Pt educated on safety and hand placement with transfers, safety and walker management with mobility, techniques to assist with LB dressing tasks, and cueing to attend to L side. At end of session, pt seated upright in chair, all tubes and lines intact, call light within reach. · Pt has made good progress towards set goals.    · Continue with current plan of care focusing on increasing of independency with transfers and ADL tasks    Treatment Time In: 8:23am           Treatment Time Out: 8:50          Treatment Charges: Mins Units   Ther Ex  [id number]     Manual Therapy [id number]     Thera Activities [id number]     ADL/Home Mgt [id number] 27 2   Neuro Re-ed [id number]     Group Therapy      Orthotic manage/training  [id number]     Non-Billable Time     Total Timed Treatment 27 2     Piedad Roberts HAY/L [id number]

## 2021-05-03 NOTE — PROGRESS NOTES
Physical Therapy  Treatment Note     Name: Kassandra Trujillo  : 1955  MRN: [id number]    Referring Provider: Maggy Skinner MD    Date of Service: 5/3/2021    Evaluating PT: eJb Winters, PT, DPT, [id number]    Room #: 9537/7890-F  Diagnosis: Encephalopathy acute  PMHx/PSHx: OA, chronic back pain, obesity, HTN  Precautions: Fall risk, L inattention, suazo, TSM, alarm    SUBJECTIVE:    Pt is a questionable historian. Son reports pt lives with him in a 2 story house with 4 stair(s) and 1 rail(s) to enter. Bed is on second floor and bath is on second floor. Full flight of stairs and 1 rail to second floor. Pt ambulated without AD prior to admission. OBJECTIVE:   Initial Evaluation  Date: 21 Treatment   Date: 5/3/2021 Short Term/ Long Term   Goals   AM-PAC 6 Clicks 86/55 13/40    Was pt agreeable to Eval/treatment? Yes yes    Does pt have pain? No current complaints of pain none    Bed Mobility  Rolling: NT  Supine to sit: SBA  Sit to supine: NT  Scooting: SBA to EOB SBA with bed rail Rolling: Independent   Supine to sit: Independent   Sit to supine: Independent   Scooting: Independent    Transfers Sit to stand: Min A from EOB, Mod A from chair  Stand to sit: Mod A  Stand pivot: Mod A without AD and with [employer] Sit<>stand: Arely from EOB and chair. Stand pivot: Arely front [employer] Sit to stand: Supervision  Stand to sit: Supervision  Stand pivot: SBA with [employer]   Ambulation   50 feet x2 with [employer] with Mod A 60 feet x 2 and 75 front [employer] Arely 200 feet with [employer] with SBA   Stair negotiation: ascended and descended NT NT d/t safety concerns >4 step(s) with 1 rail(s) with SBA   ROM BUE: Refer to OT note  BLE: WFL     Strength BUE: Refer to OT note  BLE: 4/5 grossly     Balance Sitting EOB: SBA  Dynamic Standing: Mod A with [employer] Sitting EOB: SBA  Dynamic standing: Arely front [employer] Sitting EOB: Independent   Dynamic Standing: SBA with [employer]     Pt is A & O x 4. Delayed. Flat affect. Continued L sided inattention.   Sensation:  Pt denies numbness and tingling to extremities  Edema:  unremarkable    Patient education  Pt educated on role of PT intervention. Pt educated on safety in room with utilization of call light for assistance with mobility. Pt educated on importance of maximizing OOB time by transferring to bedside chair for meals and ambulating to bathroom/transferring to bedside commode with assistance from nursing and therapy staff to increase functional activity tolerance and overall functional independence. Reinforced importance of consciously attending to L side. Patient response to education:   Pt verbalized understanding Pt demonstrated skill Pt requires further education in this area   yes yes yes     ASSESSMENT:    Comments:  RN cleared pt for activity prior to session. Pt received supine in bed and agreeable to PT intervention at this time. Pt performed all functional mobility as noted above. Pt incontinent of bowel at beginning of session requiring gown and linen change. Pt continues to present with significant L sided inattention unable to locate room when scanning to the L during ambulation but able to find it scanning to the R.  Pt had also multiple minor LOBs during ambulation requiring Jacque to correct as she becomes easily distracted. Pt returned to supine at end of session and left with all needs met and call light in reach. Pt requires continued skilled PT intervention for the purposes of maximizing functional mobility and independence by addressing deficits described above. Treatment:  Patient practiced and was instructed in the following treatment:     Therapeutic Activities Completed:   o Functional mobility as noted above:   - Bed mobility: SBA with bed rail. Min VC and hand over hand guidance to facilitate efficient use of BUE on bed rail to promote more independent completion of task. - Transfer training: STS jacque from EOB. Cues for proper hand placement and sequencing.   Pt inattentive to L side during transfers requiring frequent VC and TC for hand placement on [employer].    - Ambulation: 75 feet and 60 feet x 2 with front [employer] Arely. Standing rest break between bouts. Cues throughout for attention to L side and avoiding obstacles on L side. Pt continuously forgets to attend to L side. During bouts of ambulation pt was tasked with locating her room in hallway. Pt unable to do this when room was on her L side but was able to when room was on her R side.    o Skilled repositioning in supine with HOB elevated for comfort.  o Pt education as noted above. PLAN:    Patient is making fair progress towards established goals. Will continue with current POC.       Time in  1335  Time out  1400    Total Treatment Time  25 minutes     CPT codes:  [] Gait training 24339 0 minutes  [] Manual therapy 67532 0 minutes  [x] Therapeutic activities 43121 25 minutes  [] Therapeutic exercises 27265 0 minutes  [] Neuromuscular reeducation 55594 0 minutes    Ishan Blancas, PT, DPT  [id number]

## 2021-05-04 VITALS
OXYGEN SATURATION: 95 % | DIASTOLIC BLOOD PRESSURE: 54 MMHG | SYSTOLIC BLOOD PRESSURE: 108 MMHG | RESPIRATION RATE: 18 BRPM | HEIGHT: 62 IN | HEART RATE: 82 BPM | TEMPERATURE: 98 F | BODY MASS INDEX: 42.51 KG/M2 | WEIGHT: 231 LBS

## 2021-05-04 LAB
ALBUMIN SERPL-MCNC: 3.3 G/DL (ref 3.5–5.2)
ALP BLD-CCNC: 84 U/L (ref 35–104)
ALT SERPL-CCNC: 7 U/L (ref 0–32)
ANION GAP SERPL CALCULATED.3IONS-SCNC: 10 MMOL/L (ref 7–16)
ANISOCYTOSIS: ABNORMAL
AST SERPL-CCNC: 10 U/L (ref 0–31)
BASOPHILS ABSOLUTE: 0.07 E9/L (ref 0–0.2)
BASOPHILS RELATIVE PERCENT: 0.9 % (ref 0–2)
BILIRUB SERPL-MCNC: 0.3 MG/DL (ref 0–1.2)
BUN BLDV-MCNC: 15 MG/DL (ref 6–23)
BURR CELLS: ABNORMAL
CALCIUM SERPL-MCNC: 8.6 MG/DL (ref 8.6–10.2)
CHLORIDE BLD-SCNC: 100 MMOL/L (ref 98–107)
CO2: 23 MMOL/L (ref 22–29)
CREAT SERPL-MCNC: 0.8 MG/DL (ref 0.5–1)
EOSINOPHILS ABSOLUTE: 0.26 E9/L (ref 0.05–0.5)
EOSINOPHILS RELATIVE PERCENT: 3.5 % (ref 0–6)
GFR AFRICAN AMERICAN: >60
GFR NON-AFRICAN AMERICAN: >60 ML/MIN/1.73
GLUCOSE BLD-MCNC: 96 MG/DL (ref 74–99)
HCT VFR BLD CALC: 29.1 % (ref 34–48)
HEMOGLOBIN: 8.5 G/DL (ref 11.5–15.5)
LYMPHOCYTES ABSOLUTE: 0.96 E9/L (ref 1.5–4)
LYMPHOCYTES RELATIVE PERCENT: 13.2 % (ref 20–42)
MCH RBC QN AUTO: 20.8 PG (ref 26–35)
MCHC RBC AUTO-ENTMCNC: 29.2 % (ref 32–34.5)
MCV RBC AUTO: 71.3 FL (ref 80–99.9)
MONOCYTES ABSOLUTE: 0.44 E9/L (ref 0.1–0.95)
MONOCYTES RELATIVE PERCENT: 6.1 % (ref 2–12)
NEUTROPHILS ABSOLUTE: 5.62 E9/L (ref 1.8–7.3)
NEUTROPHILS RELATIVE PERCENT: 76.3 % (ref 43–80)
OVALOCYTES: ABNORMAL
PDW BLD-RTO: 20.7 FL (ref 11.5–15)
PLATELET # BLD: 299 E9/L (ref 130–450)
PMV BLD AUTO: 8.4 FL (ref 7–12)
POIKILOCYTES: ABNORMAL
POLYCHROMASIA: ABNORMAL
POTASSIUM REFLEX MAGNESIUM: 3.6 MMOL/L (ref 3.5–5)
RBC # BLD: 4.08 E12/L (ref 3.5–5.5)
SODIUM BLD-SCNC: 133 MMOL/L (ref 132–146)
TOTAL PROTEIN: 6.6 G/DL (ref 6.4–8.3)
WBC # BLD: 7.4 E9/L (ref 4.5–11.5)

## 2021-05-04 PROCEDURE — 85025 COMPLETE CBC W/AUTO DIFF WBC: CPT

## 2021-05-04 PROCEDURE — 80053 COMPREHEN METABOLIC PANEL: CPT

## 2021-05-04 PROCEDURE — 6370000000 HC RX 637 (ALT 250 FOR IP): Performed by: CLINICAL NURSE SPECIALIST

## 2021-05-04 PROCEDURE — 36415 COLL VENOUS BLD VENIPUNCTURE: CPT

## 2021-05-04 PROCEDURE — 6370000000 HC RX 637 (ALT 250 FOR IP): Performed by: STUDENT IN AN ORGANIZED HEALTH CARE EDUCATION/TRAINING PROGRAM

## 2021-05-04 PROCEDURE — 2580000003 HC RX 258: Performed by: FAMILY MEDICINE

## 2021-05-04 PROCEDURE — 97530 THERAPEUTIC ACTIVITIES: CPT

## 2021-05-04 RX ADMIN — LISINOPRIL 5 MG: 10 TABLET ORAL at 10:07

## 2021-05-04 RX ADMIN — CLOPIDOGREL 75 MG: 75 TABLET, FILM COATED ORAL at 10:04

## 2021-05-04 RX ADMIN — APIXABAN 10 MG: 5 TABLET, FILM COATED ORAL at 10:05

## 2021-05-04 RX ADMIN — Medication 10 ML: at 09:00

## 2021-05-04 NOTE — DISCHARGE SUMMARY
Hospitalist Discharge Summary    Patient ID: Froylan Siu   Patient : 1955  Patient's PCP: Angie Grijalva MD (Inactive)    Admit Date: 2021   Admitting Physician: Luis Perry MD    Discharge Date:  2021   Discharge Physician: Emmett Carranza MD   Discharge Condition: Stable  Discharge Disposition: Home with [address] course in brief:  (Please refer to daily progress notes for a comprehensive review of the hospitalization by requesting medical records)    HPI per Dr. Cam Kern: 73 y.o. female with past medical history of CVA HLD   . She present to the ED due to  altered mental status . Patient was with family at 10 pm sitting on the couch and suddenly became unresponsive . She started to have slurred speech . In the ED blood sugar was 222 . Patient was not hypoxic in ED Patient responds  to pain but is able to open her eyes but does not speak . History is limited because patient is nonverbal. ED course revealed trop <0.01  creatinine 1.2 lactic acid 1.5 CT perfusion revealed no acute infarct . CTA Head /neck revealed no signficant stenosis     Hospital Course:    ASSESSMENT:  Acute Encephalopathy  Acute Ischemic CVA  Paradoxical Embolism  KEE  Lactic Acidosis  Hyperglycemia  Microcytic Anemia  Acute Left Lower Extremity DVT     PLAN:  - CTH on admission negative for acute intracranial pathology  - CTA Head and Neck with mild atherosclerotic disease. No large vessel occlusion  - CT brain perfusion with there is evidence for core infarct in the right frontal lobe likely old, no acute infarct or ischemia identified  - MRI brain without contrast showed findings consistent with acute infarct in the right middle cerebral artery territory involving the right parietal lobe, posterior temporal lobe and insular cortex. - Neurology c/s noted and appreciated; following  - ADAM report on 21 with Normal LV function. Normal RV function.  No hemodynamically significant valve disease. No intracardiac mass or thrombus. Small PFO noted with an aneurysmal atrial septum. Right to left shunting of few bubbles on agitated saline study.  - B/L LE with acute LLE DVT  - Suspect paradoxical embolism  - Pt for outpatient event monitor  - Cont on Eliquis load x 7 days followed by maintenance thereafter  - Cont with Plavix and high intensity statin  - Stop ASA  - Hypercoagulable work-up to be followed-up as outpatient  - Neurology c/s noted and appreciated; outpatient follow-up  - Outpatient hematology/PCP for hypercoaguable work-up  - Encourage PO hydration. Renal function WNL. Patient seen and examined at bedside in NAD. Pt without gross focal deficits. No acute events overnight. Mentation with continued improvement. Pt is hemodynamically stable for discharge. PHYSICAL EXAMINATION:  BP (!) 108/54   Pulse 82   Temp 98 °F (36.7 °C) (Temporal)   Resp 18   Ht 5' 2\" (1.575 m)   Wt 231 lb (104.8 kg)   SpO2 95%   BMI 42.25 kg/m²   GEN: Lying comfortably in bed. No apparent distress  CVS: Normal S1S2. Regular Rate and Rhythm  PULM: Clear to ascultation bilaterally  GI: soft, non tender, not distended. + BS  NEURO: No focal deficits  EXT: No pedal edema  PSYCH: A&O X 3.  Mood, affect are appropriate    Consults:   IP CONSULT TO INTERNAL MEDICINE  IP CONSULT TO NEUROLOGY  IP CONSULT TO PHYSICAL MEDICINE REHAB  IP CONSULT TO HOME CARE NEEDS    Discharge Diagnoses:    Acute Encephalopathy  Acute Ischemic CVA  Paradoxical Embolism  KEE  Lactic Acidosis  Hyperglycemia  Microcytic Anemia  Acute Left Lower Extremity DVT      Discharge Instructions / Follow up:    Neurology  Hematology  PCP    Continued appropriate risk factor modification of blood pressure, diabetes and serum lipids will remain essential to reducing risk of future atherosclerotic development    Activity: activity as tolerated    Significant labs:  CBC:   Recent Labs     05/02/21  0454 05/03/21  0551 05/04/21  0728   WBC 7.8 8.3 7.4 RBC 4.20 4.06 4.08   HGB 8.6* 8.6* 8.5*   HCT 30.2* 29.3* 29.1*   MCV 71.9* 72.2* 71.3*   RDW 20.3* 20.4* 20.7*    299 299     BMP:   Recent Labs     05/02/21  0454 05/03/21  0551 05/04/21  0728    135 133   K 4.0 3.9 3.6    101 100   CO2 24 24 23   BUN 16 16 15   CREATININE 0.9 0.8 0.8     LFT:  Recent Labs     05/02/21  0454 05/03/21  0551 05/04/21  0728   PROT 6.4 6.5 6.6   ALKPHOS 84 84 84   ALT 6 5 7   AST 10 10 10   BILITOT 0.3 0.2 0.3     PT/INR: No results for input(s): INR, APTT in the last 72 hours. BNP: No results for input(s): BNP in the last 72 hours. Hgb A1C:   Lab Results   Component Value Date    LABA1C 5.7 (H) 04/27/2021     Folate and B12:   Lab Results   Component Value Date    EMJUUNAM86 362 04/27/2021   ,   Lab Results   Component Value Date    FOLATE 2.6 (L) 04/27/2021     Thyroid Studies:   Lab Results   Component Value Date    TSH 0.354 04/27/2021       Urinalysis:    Lab Results   Component Value Date    NITRU Negative 04/27/2021    BLOODU Negative 04/27/2021    SPECGRAV <=1.005 04/27/2021    GLUCOSEU Negative 04/27/2021       Imaging:  Echo Transesophageal    Result Date: 4/30/2021  Transesophageal Echocardiography Report (ADAM)   Demographics   Patient Name       Helga Garza Gender               Female   Medical Record     [id number]       Room Number          6315  Number   Account #          [de-identified]      Procedure Date       04/30/2021   Corporate ID                      Ordering Physician   Accession Number   [id number]     Referring Physician   Date of Birth      1955     Sonographer          Ritika Valera RDCS   Age                73 year(s)     Interpreting         Samson Joiner MD                                    Physician                                     Any Other  Procedure Type of Study   ADAM procedure:Transesophageal Echo (ADAM), Doppler Echocardiography, Color  Flow Velocity Mapping.   Procedure Date Date: 04/30/2021 Start: 01:43 PM Study Location: Portable Technical Quality: Adequate visualization Indications:CVA. Patient Status: Routine Contrast Medium: Bubble Study. Height: 63 inches Weight: 160 pounds BSA: 1.76 m^2 BMI: 28.34 kg/m^2 Rhythm: Within normal limits Allergies   - No known allergies. Findings   Left Ventricle  Normal left ventricular size and systolic function. Ejection fraction is visually estimated at 60-65%. Right Ventricle  Normal right ventricular size and function. Left Atrium  Normal sized left atrium. No evidence of thrombus within left atrium or appendage. Atrial septum is aneurysmal.  Agitated saline injected for shunt evaluation. Small PFO noted with right to left shunting of bubbles. Right Atrium  Normal right atrial size. No evidence of thrombus or mass in the right atrium. Mitral Valve  Structurally normal mitral valve. No evidence of mitral valve stenosis. Physiologic mitral regurgitation. Tricuspid Valve  The tricuspid valve appears structurally normal.  Physiologic and/or trace tricuspid regurgitation. Aortic Valve  Structurally normal aortic valve. The aortic valve is trileaflet. No hemodynamically significant aortic stenosis is present. No evidence of aortic valve regurgitation. Pulmonic Valve  Pulmonic valve is structurally normal.  No evidence of any pulmonic regurgitation. Pericardial Effusion  No evidence of pericardial effusion. Aorta  Mild atherosclerotic disease of the descending thoracic aorta. Miscellaneous  LUPV: Normal flow  LLPV: Normal flow  RUPV: Normal flow  RLPV: Normal flow   Conclusions   Summary  Ejection fraction is visually estimated at 60-65%. Atrial septum is aneurysmal.  Agitated saline injected for shunt evaluation. Small PFO noted with right to left shunting of bubbles. No significant valvular abnormalities. No intracardiac thrombus or mass.    Signature   ----------------------------------------------------------------  Electronically signed by Cullen Minaya MD(Interpreting  physician) on 04/30/2021 10:57 PM  ----------------------------------------------------------------  [email or web address] [id number]    Ct Head Wo Contrast    Result Date: 4/26/2021  EXAMINATION: CT OF THE HEAD WITHOUT CONTRAST  4/26/2021 11:15 pm TECHNIQUE: CT of the head was performed without the administration of intravenous contrast. Dose modulation, iterative reconstruction, and/or weight based adjustment of the mA/kV was utilized to reduce the radiation dose to as low as reasonably achievable. COMPARISON: None. HISTORY: ORDERING SYSTEM PROVIDED HISTORY: cva TECHNOLOGIST PROVIDED HISTORY: Reason for exam:->cva Has a \"code stroke\" or \"stroke alert\" been called? ->Yes Decision Support Exception->Emergency Medical Condition (MA) FINDINGS: There is no acute infarction, intracranial hemorrhage or intracranial mass lesion. No mass effect, midline shift or extra-axial collection is noted. There are moderate nonspecific foci of periventricular and subcortical cerebral white matter hypodensity, most likely representing chronic microangiopathic disease in this age group. Chronic encephalomalacia involving right medial occipital lobe and anterior aspect of the right superior frontal gyrus and chronic encephalomalacia of bilateral corona radiata and bilateral thalamus and bilateral basal ganglia. The brain parenchyma is otherwise normal. The cerebellar tonsils are in normal position. The ventricles, sulci, and cisterns are mildly prominent suggestive of mild generalized volume loss. The globes and orbits are within normal limits. The visualized extracranial structures including paranasal sinuses and mastoid air cells are unremarkable. No fracture is identified. 1.  No evidence for acute intracranial hemorrhage, territorial infarction or intracranial mass lesion.   However considering the extensive amount of white matter changes and extensive prior areas of infarction, brain MRI is more sensitive for detection of subtle foci of acute infarction. 2.  Chronic encephalomalacia involving right medial occipital lobe and anterior aspect of the right superior frontal gyrus and chronic encephalomalacia of bilateral corona radiata and bilateral thalamus and bilateral basal ganglia. 3.  Moderate chronic microangiopathic ischemic disease. Mild generalized volume loss. Xr Chest Portable    Result Date: 2021  EXAMINATION: ONE XRAY VIEW OF THE CHEST 2021 11:37 pm COMPARISON: 2010 HISTORY: ORDERING SYSTEM PROVIDED HISTORY: altered mental status TECHNOLOGIST PROVIDED HISTORY: Reason for exam:->altered mental status What reading provider will be dictating this exam?->CRC FINDINGS: The lungs are without acute focal process. Interstitial prominence, likely chronic. There is no effusion or pneumothorax. The cardiomediastinal silhouette is without acute process. The osseous structures are without acute process. No acute process. Interstitial prominence, likely chronic. Cta Neck W Contrast    Result Date: 2021  Patient MRN:  [id number] : 1955 Age: 73 years Gender: Female Order Date:  2021 EXAM: CTA NECK W CONTRAST, CTA HEAD W CONTRAST NUMBER OF IMAGES:  1453 INDICATION:  cva cva COMPARISON: None Technique: Low-dose CT  acquisition technique included one of following options; 1 . Automated exposure control, 2. Adjustment of MA and or KV according to patient's size or 3. Use of iterative reconstruction. Contiguous spiral images were obtained in the axial plane, following the administration of intravenous contrast using CT angiographic protocol. Sagittal and coronal images were reconstructed from the axial plane acquisition. Additional MIP reconstructions were presented to aid in the interpretation of this study. Images were obtained from the skull base cranially.  There is mild calcified plaque identified in the vessels compatible with atherosclerotic disease. There is aortic arch and great vessels demonstrate mild atherosclerotic disease. The right carotid is unremarkable. The left carotid is unremarkable. The right vertebral artery is unremarkable. The left vertebral artery is unremarkable. The basilar artery is unremarkable. The middle cerebral arteries are unremarkable. The anterior cerebral arteries are unremarkable. The posterior cerebral arteries are unremarkable. 1.  Mild atherosclerotic disease . No large vessel occlusion of the 2. Estimated stenosis of the proximal right and left internal carotid artery by NASCET criteria is not hemodynamically significant This study was analyzed by the [employer] algorithm. Ct Brain Perfusion    Result Date: 2021  Patient MRN: [id number] : 1955 Age:  73 years Gender: Female Order Date: 2021 Exam: CT BRAIN PERFUSION Number of Images: 746 views Indication:   cva cva Comparison: None. Findings: Perfusion images demonstrate asymmetric blood volume Blood flow images demonstrate symmetric blood flow There is no significant ischemic penumbra identified. There is significant core infarct identified. There is evidence for core infarct in the right frontal lobe likely old, no acute infarct or ischemia identified This study was analyzed by the [employer] algorithm. Cta Head W Contrast    Result Date: 2021  Patient MRN:  [id number] : 1955 Age: 73 years Gender: Female Order Date:  2021 EXAM: CTA NECK W CONTRAST, CTA HEAD W CONTRAST NUMBER OF IMAGES:  1453 INDICATION:  cva cva COMPARISON: None Technique: Low-dose CT  acquisition technique included one of following options; 1 . Automated exposure control, 2. Adjustment of MA and or KV according to patient's size or 3. Use of iterative reconstruction.  Contiguous spiral images were obtained in the axial plane, following the administration of intravenous contrast using CT angiographic protocol. Sagittal and coronal images were reconstructed from the axial plane acquisition. Additional MIP reconstructions were presented to aid in the interpretation of this study. Images were obtained from the skull base cranially. There is mild calcified plaque identified in the vessels compatible with atherosclerotic disease. There is aortic arch and great vessels demonstrate mild atherosclerotic disease. The right carotid is unremarkable. The left carotid is unremarkable. The right vertebral artery is unremarkable. The left vertebral artery is unremarkable. The basilar artery is unremarkable. The middle cerebral arteries are unremarkable. The anterior cerebral arteries are unremarkable. The posterior cerebral arteries are unremarkable. 1.  Mild atherosclerotic disease . No large vessel occlusion of the 2. Estimated stenosis of the proximal right and left internal carotid artery by NASCET criteria is not hemodynamically significant This study was analyzed by the [employer] algorithm. Mri Brain Without Contrast    Result Date: 4/28/2021  EXAMINATION: MRI OF THE BRAIN WITHOUT CONTRAST  4/28/2021 8:46 am TECHNIQUE: Multiplanar multisequence MRI of the brain was performed without the administration of intravenous contrast. COMPARISON: Head CT dated 04/26/2021 HISTORY: ORDERING SYSTEM PROVIDED HISTORY: AMS TECHNOLOGIST PROVIDED HISTORY: Reason for exam:->AMS What reading provider will be dictating this exam?->CRC FINDINGS: The patient was uncooperative and only axial diffusion-weighted images could be done. These images are also degraded by motion artifact. There is a large area of restricted diffusion, consistent with acute ischemia, in the right parietal lobe and also involving the posterior temporal lobe and posterior insular cortex. There is an old right frontal infarct. INTRACRANIAL STRUCTURES/VENTRICLES: There is no acute infarct. No mass effect or midline shift.  No evidence of an acute Your Medications      These medications were sent to [facility] [address] [address]    Phone: [phone]   · apixaban 5 MG Tabs tablet  · apixaban 5 MG Tabs tablet  · atorvastatin 40 MG tablet  · clopidogrel 75 MG tablet  · lisinopril 5 MG tablet       Time Spent on discharge is more than 45 minutes in the examination, evaluation, counseling and review of medications and discharge plan.    +++++++++++++++++++++++++++++++++++++++++++++++++  MD JOHNNY Vicente/ [address]  +++++++++++++++++++++++++++++++++++++++++++++++++  NOTE: This report was transcribed using voice recognition software. Every effort was made to ensure accuracy; however, inadvertent computerized transcription errors may be present.

## 2021-05-04 NOTE — PROGRESS NOTES
Denial obtained by payer source and after peer to peer, denial was upheld. Rose Marie Maloney SW notified.

## 2021-05-04 NOTE — CARE COORDINATION
5/4, GLENN spoke with Shabbir from ARU on patient being denied by insurance and  doing peer to peer review today between 2-4pm.  Met with patient on her possibly being denied and discussed recent PT-16/24 and ambulated 61 feet and OT-18/24. Patient confident in going home with [facility] should patient be denied by the peer to peer being completed today. Getting back up plan in place-[facility] patient has no preference as long as insurance covers-so [facility] referral made to Barry Daley at [facility]. Barry Daley to run insurance and let GLENN know on referral.  Discussed with patient DME needed at home. Patient would need a WW and BSC. Patient has no preference only that insurance covers items. Nicki Crooks called and accepts insurance. Extended tub bench is not covered by insurance. Patient would need to purchase on own. Patient lives with son and would have son for transportation should patient be discharged to home. Will need HHC and DME orders. GLENN to follow for further discharge planning needs.       Rosalva Harada, SARY  PHYSICIANS [facility] SURGICAL [facility] Case Management  [phone]

## 2021-05-04 NOTE — CARE COORDINATION
5/4, SW spoke with Shabbir on patient being denied by insurance with the peer to peer review. Met with patient and spoke with patient and son on phone in room of patient. Discussed patient being discharged to home with DME [facility] and [facility]. Extended tub bench would not be covered by insurance. Patient and son aware. Ranjith Weir faxed DME orders for [employer] and [facility] and called to have items delivered to home later today. Nursing informed of patient being discharged to home and needing DME orders and C order. Referral was made to [facility] for [address]. Patient to have son for transportation. SW to follow for further discharge planning needs.       CINDY Sanches  [facility] Case Management  [phone]

## 2021-05-04 NOTE — CARE COORDINATION
5/4, SW was called by sister in law Tj Ghosh on patient being discharged. Discussed recent PT/OT scores and patient doing well enough to go home and Acute Rehab being denied by insurance. Tj Ghosh can be reached at [phone] for transportation. CM met with patient in room and confirmed patient wanting to go home. Nursing updated and informed on transport for patient to home. SW to follow for further discharge planning needs.       CINDY Aviles  [facility] Case Management  [phone]

## 2021-05-04 NOTE — PROGRESS NOTES
to extremities  Edema:  unremarkable    Patient education  Pt educated on role of PT intervention. Pt educated on safety in room with utilization of call light for assistance with mobility. Pt educated on importance of maximizing OOB time by transferring to bedside chair for meals and ambulating to bathroom/transferring to bedside commode with assistance from nursing and therapy staff to increase functional activity tolerance and overall functional independence. Reinforced importance of consciously attending to L side. Patient response to education:   Pt verbalized understanding Pt demonstrated skill Pt requires further education in this area   yes yes yes     ASSESSMENT:    Comments:  RN cleared pt for activity prior to session. Pt received supine in bed and agreeable to PT intervention at this time. Pt performed all functional mobility as noted above. Pt continues to be limited by L inattention/impaired L visual field. Overall strength and cognition have improved. Pt requires occasional reminders to tend to L field of vision. No LOB noted with ambulation on this date. Pt returned to seated in chair at end of session and left with all needs met and call light in reach. Pt requires continued skilled PT intervention for the purposes of maximizing functional mobility and independence by addressing deficits described above. Treatment:  Patient practiced and was instructed in the following treatment:     Therapeutic Activities Completed:   o Functional mobility as noted above:   - Bed mobility: Supervision without bed rail.    - Transfer training: STS Supervision from EOB and chair. Stand pivot with SBA. Pt had difficulty location [employer] with L hand requiring min VC to direct her attention to LUE. - Ambulation: 300 feet with front WW SBA x 2 reps. Standing rest break between bouts. Cues throughout for attention to L side and avoiding obstacles on L side.   Pt occasionally forgets to attend to L side but is showing some carry over from previous sessions and seems to have good understanding of her loss of L visual field. - Stair trainin steps with 1 rail SBA. Min VC for NR patterning for safety.    o Skilled repositioning in seated position for comfort.  o Pt education as noted above. PLAN:    Patient is making good progress towards established goals. Will continue with current POC.       Time in  1315  Time out  1340    Total Treatment Time  25 minutes     CPT codes:  [] Gait training 89026 0 minutes  [] Manual therapy 15958 0 minutes  [x] Therapeutic activities 19470 25 minutes  [] Therapeutic exercises 48638 0 minutes  [] Neuromuscular reeducation 99740 0 minutes    Viv Aguiar, PT, DPT  [id number]

## 2021-05-06 LAB — THROMBOPHILIA DNA ASSAY: NORMAL

## 2021-05-10 ENCOUNTER — OFFICE VISIT (OUTPATIENT)
Dept: FAMILY MEDICINE CLINIC | Age: 66
End: 2021-05-10
Payer: MEDICARE

## 2021-05-10 VITALS
WEIGHT: 179.4 LBS | BODY MASS INDEX: 33.01 KG/M2 | RESPIRATION RATE: 18 BRPM | HEART RATE: 104 BPM | DIASTOLIC BLOOD PRESSURE: 56 MMHG | TEMPERATURE: 97.6 F | HEIGHT: 62 IN | SYSTOLIC BLOOD PRESSURE: 104 MMHG | OXYGEN SATURATION: 97 %

## 2021-05-10 DIAGNOSIS — I82.462 ACUTE DEEP VEIN THROMBOSIS (DVT) OF CALF MUSCLE VEIN OF LEFT LOWER EXTREMITY (HCC): ICD-10-CM

## 2021-05-10 DIAGNOSIS — I10 ESSENTIAL HYPERTENSION: Primary | ICD-10-CM

## 2021-05-10 DIAGNOSIS — I63.411 CEREBROVASCULAR ACCIDENT (CVA) DUE TO EMBOLISM OF RIGHT MIDDLE CEREBRAL ARTERY (HCC): ICD-10-CM

## 2021-05-10 DIAGNOSIS — Q21.12 PFO (PATENT FORAMEN OVALE): ICD-10-CM

## 2021-05-10 DIAGNOSIS — Z72.0 TOBACCO ABUSE: ICD-10-CM

## 2021-05-10 DIAGNOSIS — R32 URINARY INCONTINENCE, UNSPECIFIED TYPE: ICD-10-CM

## 2021-05-10 PROCEDURE — G8400 PT W/DXA NO RESULTS DOC: HCPCS | Performed by: INTERNAL MEDICINE

## 2021-05-10 PROCEDURE — 99203 OFFICE O/P NEW LOW 30 MIN: CPT | Performed by: INTERNAL MEDICINE

## 2021-05-10 PROCEDURE — 1111F DSCHRG MED/CURRENT MED MERGE: CPT | Performed by: INTERNAL MEDICINE

## 2021-05-10 PROCEDURE — 0509F URINE INCON PLAN DOCD: CPT | Performed by: INTERNAL MEDICINE

## 2021-05-10 PROCEDURE — G8417 CALC BMI ABV UP PARAM F/U: HCPCS | Performed by: INTERNAL MEDICINE

## 2021-05-10 PROCEDURE — G8427 DOCREV CUR MEDS BY ELIG CLIN: HCPCS | Performed by: INTERNAL MEDICINE

## 2021-05-10 PROCEDURE — 3017F COLORECTAL CA SCREEN DOC REV: CPT | Performed by: INTERNAL MEDICINE

## 2021-05-10 PROCEDURE — 1123F ACP DISCUSS/DSCN MKR DOCD: CPT | Performed by: INTERNAL MEDICINE

## 2021-05-10 PROCEDURE — 4040F PNEUMOC VAC/ADMIN/RCVD: CPT | Performed by: INTERNAL MEDICINE

## 2021-05-10 PROCEDURE — 4004F PT TOBACCO SCREEN RCVD TLK: CPT | Performed by: INTERNAL MEDICINE

## 2021-05-10 PROCEDURE — 1090F PRES/ABSN URINE INCON ASSESS: CPT | Performed by: INTERNAL MEDICINE

## 2021-05-10 RX ORDER — BLOOD PRESSURE TEST KIT
1 KIT MISCELLANEOUS DAILY
Qty: 1 KIT | Refills: 0 | Status: SHIPPED | OUTPATIENT
Start: 2021-05-10

## 2021-05-10 RX ORDER — UNDERPADS 23" X 36"
1 EACH MISCELLANEOUS 3 TIMES DAILY
Qty: 4 PACKAGE | Refills: 3 | Status: SHIPPED
Start: 2021-05-10 | End: 2021-07-14 | Stop reason: ALTCHOICE

## 2021-05-10 ASSESSMENT — PATIENT HEALTH QUESTIONNAIRE - PHQ9
2. FEELING DOWN, DEPRESSED OR HOPELESS: 0
SUM OF ALL RESPONSES TO PHQ9 QUESTIONS 1 & 2: 0
SUM OF ALL RESPONSES TO PHQ QUESTIONS 1-9: 0
SUM OF ALL RESPONSES TO PHQ QUESTIONS 1-9: 0

## 2021-05-10 NOTE — PROGRESS NOTES
[facility] PRIMARY CARE  [address] [place] [address]  [address] [facility] [address]  Dept: [phone]  Dept Fax: [phone]     NAME: Vicenta Wheat        :  1955        MRN:  <[id number]>    Chief Complaint   Patient presents with   Wes Gomez New Patient     f/u hospital stay stroke       History of Present Illness  Vicenta Wheat is a 73 y.o. female who presents today to [facility]. Patient was recently hospitalized due to embolic stroke. She was brought in unresponsive and worked up for stroke as she has a previous CVA in 2017. Patient was on no medications at the time of hospitalization. She was found to have a PFO with aneurysmal atrial septum and right to left shunting along with a left lower extremity DVT leading to suspected paradoxical embolism. She was started on Eliquis and remains on the loading dose at this time. Patient presents today with her son who she is currently living with. Patient states that she is weak and fatigued although believes she is functioning well and does not seem to be far from her baseline. Patient was to be set up with home health physical therapy through [facility] although they have not yet heard from [facility] and are unsure who set this up with. Per care coordination notes with social work from her admission they were in contact with the patient's daughter-in-law oJse Morales who is setting this up. There is no contact information for Lacy provided in these notes although the patient's son states he believes he has this at home and will be contacting us with this information. The patient's main complaint today is urinary incontinence that she has had for several years and is unrelated to her recent hospitalization. Patient has not seen a physician in several years and is on no medications prior to this hospitalization. Review of Systems  Please see HPI above. All bolded are positive.   Gen: fever, chills, fatigue, MISC 1 each by Does not apply route 3 times daily 4 Package 3    apixaban (ELIQUIS) 5 MG TABS tablet Take 1 tablet by mouth 2 times daily 60 tablet 0    atorvastatin (LIPITOR) 40 MG tablet Take 1 tablet by mouth nightly 30 tablet 3    lisinopril (PRINIVIL;ZESTRIL) 5 MG tablet Take 1 tablet by mouth daily 30 tablet 3    clopidogrel (PLAVIX) 75 MG tablet Take 1 tablet by mouth daily 30 tablet 3     No current facility-administered medications for this visit. Allergies  No Known Allergies    Objective  Vitals:    05/10/21 1351   BP: (!) 104/56   Pulse: 104   Resp: 18   Temp: 97.6 °F (36.4 °C)   TempSrc: Oral   SpO2: 97%   Weight: 179 lb 6.4 oz (81.4 kg)   Height: 5' 2\" (1.575 m)        Physical Exam:  General: Awake, alert, and oriented to person, place, time, and purpose, appears stated age and cooperative, No acute distress, shuffling gait  Head: Normocephalic, atraumatic  Eyes: conjunctivae/corneas clear, EOM's intact. Mouth: Mucous membranes moist with no pharyngeal exudate or erythema  Neck: no JVD, no adenopathy, no carotid bruit, supple, symmetrical, trachea midline  Back: symmetric, ROM normal, No CVA tenderness. Lungs: clear to auscultation bilaterally without wheezes, rales, or rhonchi  Heart: regular rate and rhythm, S1, S2 normal, no murmur, click, rub or gallop  Abdomen: soft, non-tender; bowel sounds normal; no masses,  no organomegaly  Extremities: atraumatic, no cyanosis, no edema, 2+ pulses palpated in all 4 extremities  Skin: color, texture, turgor within normal limits.  No rashes or lesions or normal  Neurologic: speech appropriate, moves all 4 extremities, normal muscle strength and tone, CN 2-12 grossly intact    Labs  Lab Results   Component Value Date    WBC 7.4 05/04/2021    HGB 8.5 (L) 05/04/2021    HCT 29.1 (L) 05/04/2021     05/04/2021     05/04/2021    K 3.6 05/04/2021     05/04/2021    CREATININE 0.8 05/04/2021    BUN 15 05/04/2021    CO2 23 05/04/2021 GLUCOSE 96 05/04/2021    ALT 7 05/04/2021    AST 10 05/04/2021    INR 1.2 04/26/2021     Lab Results   Component Value Date    TSH 0.354 04/27/2021     Lab Results   Component Value Date    TRIG 125 04/27/2021    TRIG 168 (H) 06/12/2012     Lab Results   Component Value Date    HDL 30 04/27/2021    HDL 35.0 (A) 06/12/2012     Lab Results   Component Value Date    LDLCALC 101 (H) 04/27/2021    LDLCALC 157 (H) 06/12/2012     Lab Results   Component Value Date    LABA1C 5.7 (H) 04/27/2021     Lab Results   Component Value Date    INR 1.2 04/26/2021    PROTIME 12.9 (H) 04/26/2021      *All recent labs were reviewed. Please see electronic chart for a more comprehensive set of labs    Radiology  Echo Transesophageal    Result Date: 4/30/2021  Transesophageal Echocardiography Report (ADAM)   Demographics   Patient Name       Jaylen Tilley Gender               Female   Medical Record     [id number]       Room Number          1534  Number   Account #          [de-identified]      Procedure Date       04/30/2021   Corporate ID                      Ordering Physician   Accession Number   [id number]     Referring Physician   Date of Birth      1955     [address]   Age                73 year(s)     Interpreting         Bon Jovel MD                                    Physician                                     Any Other  Procedure Type of Study   ADAM procedure:Transesophageal Echo (ADAM), Doppler Echocardiography, Color  Flow Velocity Mapping. Procedure Date Date: 04/30/2021 Start: 01:43 PM Study Location: Portable Technical Quality: Adequate visualization Indications:CVA. Patient Status: Routine Contrast Medium: Bubble Study. Height: 63 inches Weight: 160 pounds BSA: 1.76 m^2 BMI: 28.34 kg/m^2 Rhythm: Within normal limits Allergies   - No known allergies. Findings   Left Ventricle  Normal left ventricular size and systolic function. Ejection fraction is visually estimated at 60-65%.    Right Ventricle  Normal right ventricular size and function. Left Atrium  Normal sized left atrium. No evidence of thrombus within left atrium or appendage. Atrial septum is aneurysmal.  Agitated saline injected for shunt evaluation. Small PFO noted with right to left shunting of bubbles. Right Atrium  Normal right atrial size. No evidence of thrombus or mass in the right atrium. Mitral Valve  Structurally normal mitral valve. No evidence of mitral valve stenosis. Physiologic mitral regurgitation. Tricuspid Valve  The tricuspid valve appears structurally normal.  Physiologic and/or trace tricuspid regurgitation. Aortic Valve  Structurally normal aortic valve. The aortic valve is trileaflet. No hemodynamically significant aortic stenosis is present. No evidence of aortic valve regurgitation. Pulmonic Valve  Pulmonic valve is structurally normal.  No evidence of any pulmonic regurgitation. Pericardial Effusion  No evidence of pericardial effusion. Aorta  Mild atherosclerotic disease of the descending thoracic aorta. Miscellaneous  LUPV: Normal flow  LLPV: Normal flow  RUPV: Normal flow  RLPV: Normal flow   Conclusions   Summary  Ejection fraction is visually estimated at 60-65%. Atrial septum is aneurysmal.  Agitated saline injected for shunt evaluation. Small PFO noted with right to left shunting of bubbles. No significant valvular abnormalities. No intracardiac thrombus or mass. Signature   ----------------------------------------------------------------  Electronically signed by Jaun Balbuena MD(Interpreting  physician) on 04/30/2021 10:57 PM  ----------------------------------------------------------------  [email or web address] [id number]    Ct Head Wo Contrast    Result Date: 4/26/2021  EXAMINATION: CT OF THE HEAD WITHOUT CONTRAST  4/26/2021 11:15 pm TECHNIQUE: CT of the head was performed without the administration of intravenous contrast. Dose modulation, iterative reconstruction, and/or weight based adjustment of the mA/kV was utilized to reduce the radiation dose to as low as reasonably achievable. COMPARISON: None. HISTORY: ORDERING SYSTEM PROVIDED HISTORY: cva TECHNOLOGIST PROVIDED HISTORY: Reason for exam:->cva Has a \"code stroke\" or \"stroke alert\" been called? ->Yes Decision Support Exception->Emergency Medical Condition (MA) FINDINGS: There is no acute infarction, intracranial hemorrhage or intracranial mass lesion. No mass effect, midline shift or extra-axial collection is noted. There are moderate nonspecific foci of periventricular and subcortical cerebral white matter hypodensity, most likely representing chronic microangiopathic disease in this age group. Chronic encephalomalacia involving right medial occipital lobe and anterior aspect of the right superior frontal gyrus and chronic encephalomalacia of bilateral corona radiata and bilateral thalamus and bilateral basal ganglia. The brain parenchyma is otherwise normal. The cerebellar tonsils are in normal position. The ventricles, sulci, and cisterns are mildly prominent suggestive of mild generalized volume loss. The globes and orbits are within normal limits. The visualized extracranial structures including paranasal sinuses and mastoid air cells are unremarkable. No fracture is identified. 1.  No evidence for acute intracranial hemorrhage, territorial infarction or intracranial mass lesion. However considering the extensive amount of white matter changes and extensive prior areas of infarction, brain MRI is more sensitive for detection of subtle foci of acute infarction. 2.  Chronic encephalomalacia involving right medial occipital lobe and anterior aspect of the right superior frontal gyrus and chronic encephalomalacia of bilateral corona radiata and bilateral thalamus and bilateral basal ganglia.  3.  Moderate chronic microangiopathic ischemic disease. Mild generalized volume loss. Xr Chest Portable    Result Date: 2021  EXAMINATION: ONE XRAY VIEW OF THE CHEST 2021 11:37 pm COMPARISON: 2010 HISTORY: ORDERING SYSTEM PROVIDED HISTORY: altered mental status TECHNOLOGIST PROVIDED HISTORY: Reason for exam:->altered mental status What reading provider will be dictating this exam?->CRC FINDINGS: The lungs are without acute focal process. Interstitial prominence, likely chronic. There is no effusion or pneumothorax. The cardiomediastinal silhouette is without acute process. The osseous structures are without acute process. No acute process. Interstitial prominence, likely chronic. Cta Neck W Contrast    Result Date: 2021  Patient MRN:  [id number] : 1955 Age: 73 years Gender: Female Order Date:  2021 EXAM: CTA NECK W CONTRAST, CTA HEAD W CONTRAST NUMBER OF IMAGES:  1453 INDICATION:  cva cva COMPARISON: None Technique: Low-dose CT  acquisition technique included one of following options; 1 . Automated exposure control, 2. Adjustment of MA and or KV according to patient's size or 3. Use of iterative reconstruction. Contiguous spiral images were obtained in the axial plane, following the administration of intravenous contrast using CT angiographic protocol. Sagittal and coronal images were reconstructed from the axial plane acquisition. Additional MIP reconstructions were presented to aid in the interpretation of this study. Images were obtained from the skull base cranially. There is mild calcified plaque identified in the vessels compatible with atherosclerotic disease. There is aortic arch and great vessels demonstrate mild atherosclerotic disease. The right carotid is unremarkable. The left carotid is unremarkable. The right vertebral artery is unremarkable. The left vertebral artery is unremarkable. The basilar artery is unremarkable.  The middle cerebral arteries are unremarkable. The anterior cerebral arteries are unremarkable. The posterior cerebral arteries are unremarkable. 1.  Mild atherosclerotic disease . No large vessel occlusion of the 2. Estimated stenosis of the proximal right and left internal carotid artery by NASCET criteria is not hemodynamically significant This study was analyzed by the [employer] ai algorithm. Ct Brain Perfusion    Result Date: 2021  Patient MRN: [id number] : 1955 Age:  73 years Gender: Female Order Date: 2021 Exam: CT BRAIN PERFUSION Number of Images: 374 views Indication:   cva cva Comparison: None. Findings: Perfusion images demonstrate asymmetric blood volume Blood flow images demonstrate symmetric blood flow There is no significant ischemic penumbra identified. There is significant core infarct identified. There is evidence for core infarct in the right frontal lobe likely old, no acute infarct or ischemia identified This study was analyzed by the [employer] ai algorithm. Cta Head W Contrast    Result Date: 2021  Patient MRN:  [id number] : 1955 Age: 73 years Gender: Female Order Date:  2021 EXAM: CTA NECK W CONTRAST, CTA HEAD W CONTRAST NUMBER OF IMAGES:  1453 INDICATION:  cva cva COMPARISON: None Technique: Low-dose CT  acquisition technique included one of following options; 1 . Automated exposure control, 2. Adjustment of MA and or KV according to patient's size or 3. Use of iterative reconstruction. Contiguous spiral images were obtained in the axial plane, following the administration of intravenous contrast using CT angiographic protocol. Sagittal and coronal images were reconstructed from the axial plane acquisition. Additional MIP reconstructions were presented to aid in the interpretation of this study. Images were obtained from the skull base cranially. There is mild calcified plaque identified in the vessels compatible with atherosclerotic disease.  There is aortic arch and great vessels cortex. Us Dup Lower Extremities Bilateral Venous    Result Date: 2021  Patient MRN:  [id number] : 1955 Age: 73 years Gender: Female Order Date:  2021 11:29 AM EXAM: US DUP LOWER EXTREMITIES BILATERAL VENOUS NUMBER OF IMAGES:  61 INDICATION:  r/o DVT r/o DVT What reading provider will be dictating this exam?->[facility] COMPARISON: None There is evidence for deep venous thrombosis in the left lower extremity involving the posterior tibial vein There is otherwise good compressibility, there is good augmentation, there is good color flow. There is evidence for deep venous thrombosis in the left posterior tibial vein ALERT:  THIS IS AN ABNORMAL REPORT                                  Health Maintenance Due   Topic Date Due    Hepatitis C screen  Never done    HIV screen  Never done    COVID-19 Vaccine (1) Never done    DTaP/Tdap/Td vaccine (1 - Tdap) Never done    Cervical cancer screen  Never done    Shingles Vaccine (1 of 2) Never done    Colon cancer screen colonoscopy  Never done    DEXA (modify frequency per FRAX score)  Never done    Breast cancer screen  2014    Pneumococcal 65+ years Vaccine (1 of 1 - PPSV23) Never done   [employer] Visit (AWV)  Never done         Assessment and Plan  Audreyveto Veronika presents today to establish care. Suzette Castañedaeron was seen today for new patient.     Diagnoses and all orders for this visit:    Essential hypertension  -     Blood Pressure KIT; 1 each by Does not apply route daily    Urinary incontinence, unspecified type  -     Incontinence Supply Disposable (INCONTINENCE BRIEF MEDIUM) MISC; 1 each by Does not apply route 3 times daily  -     Cailin Cabrera MD, OB/GYN, [facility]    Cerebrovascular accident (CVA) due to embolism of right middle cerebral artery [facility])    Acute deep vein thrombosis (DVT) of calf muscle vein of left lower extremity (HCC)    PFO (patent foramen ovale)    Tobacco abuse        Educational materials and/or home exercises printed for patient's review and were included in patient instructions on his/her After Visit Summary and given to patient at the end of visit. Counseled regarding above diagnosis, including possible risks and complications, especially if left uncontrolled. Counseled regarding the possible side effects, risks, benefits and alternatives to treatment; patient and/or guardian verbalizes understanding, agrees, feels comfortable with and wishes to proceed with above treatment plan. Advised patient to call Galina Cartwrightene new medication issues, and read all Rx info from pharmacy to assure aware of all possible risks and side effects of medication before taking. Reviewed age and gender appropriate health screening exams and vaccinations. Advised patient regarding importance of keeping up with recommended health maintenance and to schedule as soon as possible if overdue, as this is important in assessing for undiagnosed pathology, especially cancer, as well as protecting against potentially harmful/life threatening disease. Patient verbalizes understanding and agrees with above counseling, assessment and plan. All questions answered.     Leticia Saldana DO  5/10/2021  4:50 PM

## 2021-05-21 ENCOUNTER — TELEPHONE (OUTPATIENT)
Dept: FAMILY MEDICINE CLINIC | Age: 66
End: 2021-05-21
Payer: MEDICARE

## 2021-05-21 DIAGNOSIS — M62.81 MUSCLE WEAKNESS (GENERALIZED): Primary | ICD-10-CM

## 2021-05-21 PROCEDURE — G0180 MD CERTIFICATION HHA PATIENT: HCPCS | Performed by: INTERNAL MEDICINE

## 2021-05-27 ENCOUNTER — APPOINTMENT (OUTPATIENT)
Dept: GENERAL RADIOLOGY | Age: 66
End: 2021-05-27
Payer: MEDICARE

## 2021-05-27 ENCOUNTER — HOSPITAL ENCOUNTER (EMERGENCY)
Age: 66
Discharge: HOME | End: 2021-05-27
Payer: MEDICARE

## 2021-05-27 VITALS
HEIGHT: 63 IN | TEMPERATURE: 98.2 F | BODY MASS INDEX: 31.71 KG/M2 | WEIGHT: 179 LBS | RESPIRATION RATE: 16 BRPM | HEART RATE: 90 BPM | DIASTOLIC BLOOD PRESSURE: 66 MMHG | SYSTOLIC BLOOD PRESSURE: 134 MMHG | OXYGEN SATURATION: 96 %

## 2021-05-27 DIAGNOSIS — S92.352A DISPLACED FRACTURE OF FIFTH METATARSAL BONE, LEFT FOOT, INITIAL ENCOUNTER FOR CLOSED FRACTURE: Primary | ICD-10-CM

## 2021-05-27 PROCEDURE — 73630 X-RAY EXAM OF FOOT: CPT

## 2021-05-27 PROCEDURE — 29515 APPLICATION SHORT LEG SPLINT: CPT

## 2021-05-27 PROCEDURE — 99283 EMERGENCY DEPT VISIT LOW MDM: CPT

## 2021-05-27 RX ORDER — HYDROCODONE BITARTRATE AND ACETAMINOPHEN 5; 325 MG/1; MG/1
1 TABLET ORAL EVERY 4 HOURS PRN
Qty: 12 TABLET | Refills: 0 | Status: SHIPPED | OUTPATIENT
Start: 2021-05-27 | End: 2021-05-30

## 2021-05-27 ASSESSMENT — PAIN DESCRIPTION - LOCATION: LOCATION: FOOT

## 2021-05-27 ASSESSMENT — PAIN SCALES - GENERAL: PAINLEVEL_OUTOF10: 8

## 2021-05-27 ASSESSMENT — PAIN DESCRIPTION - ORIENTATION: ORIENTATION: LEFT

## 2021-05-27 ASSESSMENT — PAIN DESCRIPTION - FREQUENCY: FREQUENCY: INTERMITTENT

## 2021-05-27 ASSESSMENT — PAIN DESCRIPTION - PROGRESSION: CLINICAL_PROGRESSION: GRADUALLY WORSENING

## 2021-05-27 ASSESSMENT — PAIN DESCRIPTION - DESCRIPTORS: DESCRIPTORS: BURNING

## 2021-05-27 ASSESSMENT — PAIN DESCRIPTION - PAIN TYPE: TYPE: ACUTE PAIN

## 2021-05-27 NOTE — ED NOTES
Discharged  To follow with ortho referral.    Pt has + sensation distal to splint.   Cap refill < 2 seconds  rx x 1 escribed to pharmacy     Naomi Singh RN  05/27/21 8522

## 2021-05-28 ENCOUNTER — OFFICE VISIT (OUTPATIENT)
Dept: ORTHOPEDIC SURGERY | Age: 66
End: 2021-05-28
Payer: MEDICARE

## 2021-05-28 VITALS — HEIGHT: 63 IN | WEIGHT: 179 LBS | BODY MASS INDEX: 31.71 KG/M2

## 2021-05-28 DIAGNOSIS — S92.355A CLOSED NONDISPLACED FRACTURE OF FIFTH METATARSAL BONE OF LEFT FOOT, INITIAL ENCOUNTER: Primary | ICD-10-CM

## 2021-05-28 DIAGNOSIS — Z77.22 TOBACCO SMOKE EXPOSURE: ICD-10-CM

## 2021-05-28 PROCEDURE — 1090F PRES/ABSN URINE INCON ASSESS: CPT | Performed by: ORTHOPAEDIC SURGERY

## 2021-05-28 PROCEDURE — 4004F PT TOBACCO SCREEN RCVD TLK: CPT | Performed by: ORTHOPAEDIC SURGERY

## 2021-05-28 PROCEDURE — G8400 PT W/DXA NO RESULTS DOC: HCPCS | Performed by: ORTHOPAEDIC SURGERY

## 2021-05-28 PROCEDURE — 1111F DSCHRG MED/CURRENT MED MERGE: CPT | Performed by: ORTHOPAEDIC SURGERY

## 2021-05-28 PROCEDURE — 99406 BEHAV CHNG SMOKING 3-10 MIN: CPT | Performed by: ORTHOPAEDIC SURGERY

## 2021-05-28 PROCEDURE — G8428 CUR MEDS NOT DOCUMENT: HCPCS | Performed by: ORTHOPAEDIC SURGERY

## 2021-05-28 PROCEDURE — 1123F ACP DISCUSS/DSCN MKR DOCD: CPT | Performed by: ORTHOPAEDIC SURGERY

## 2021-05-28 PROCEDURE — 3017F COLORECTAL CA SCREEN DOC REV: CPT | Performed by: ORTHOPAEDIC SURGERY

## 2021-05-28 PROCEDURE — 99204 OFFICE O/P NEW MOD 45 MIN: CPT | Performed by: ORTHOPAEDIC SURGERY

## 2021-05-28 PROCEDURE — 4040F PNEUMOC VAC/ADMIN/RCVD: CPT | Performed by: ORTHOPAEDIC SURGERY

## 2021-05-28 PROCEDURE — G8417 CALC BMI ABV UP PARAM F/U: HCPCS | Performed by: ORTHOPAEDIC SURGERY

## 2021-05-28 RX ORDER — HYDROCODONE BITARTRATE AND ACETAMINOPHEN 5; 325 MG/1; MG/1
1 TABLET ORAL EVERY 8 HOURS PRN
Qty: 21 TABLET | Refills: 0 | Status: CANCELLED | OUTPATIENT
Start: 2021-05-28 | End: 2021-06-04

## 2021-05-28 NOTE — ED PROVIDER NOTES
allergies. Physical Exam   Oxygen Saturation Interpretation: Normal.        ED Triage Vitals   BP Temp Temp Source Pulse Resp SpO2 Height Weight   05/27/21 1235 05/27/21 1235 05/27/21 1235 05/27/21 1235 05/27/21 1235 05/27/21 1235 05/27/21 1244 05/27/21 1244   134/66 98.2 °F (36.8 °C) Temporal 90 16 96 % 5' 3\" (1.6 m) 179 lb (81.2 kg)         Constitutional:  Alert, development consistent with age. Neck:  Normal ROM. Supple. Foot: Left lateral 5th metatarsal(s). Tenderness:  severe. Swelling: Severe. Deformity: no.              ROM: diminished range with pain. Skin:  warmth, tenderness, swelling and no erythema, rash or wounds noted. Neurovascular: Motor deficit: none. Sensory deficit:   none. Pulse deficit: none. Capillary refill: normal.  Ankle:               Tenderness:  none. Swelling: None. Deformity: no.             ROM: full range with pain. Skin:  normal exam; no wounds, erythema, or swelling. Gait:  normal.  Lymphatics: No lymphangitis or adenopathy noted. Neurological:  Oriented. Motor functions intact. Lab / Imaging Results   (All laboratory and radiology results have been personally reviewed by myself)  Labs:  No results found for this visit on 05/27/21. Imaging: All Radiology results interpreted by Radiologist unless otherwise noted. XR FOOT LEFT (MIN 3 VIEWS)   Final Result   Displaced spiral fracture of the left 5th metatarsal with significant soft   tissue swelling in the left ankle and foot. ED Course / Medical Decision Making   Medications - No data to display     Consult(s):   none. Procedure(s):     PROCEDURE NOTE  5/27/21           SPLINT  APPLICATION  Risks, benefits and alternatives (for applicable procedures below) described. Performed By: Benson Dickinson.     Indication:  fracture of left fifth metatarsal.  Procedure:   A short left

## 2021-05-28 NOTE — PROGRESS NOTES
Chief Complaint   Patient presents with   [facility] ED Follow-up     patient here for an ED follow up from 05/27/2021, for displaced fx of fifth metatarsal bone.Ghislaine is a 73 y.o. female who presents today with a Left foot injury. The injury occurred 5/27/2021. The history of injury was from fall. The patient complains of pain over lateral foot. The intensity is 4/10. The pain is described as: sharp. Previous treatment includes: rest, ice, heat, NSAIDs, HEP without much relief. .      Past Medical History:   Diagnosis Date    Acute gastroenteritis 03/2010    Cerebral artery occlusion with cerebral infarction ([facility] [address]     Chronic back pain     Headache(784.0)     Hyperlipidemia 03/2010    Hypertension     Obesity     Osteoarthritis     Tobacco abuse      Past Surgical History:   Procedure Laterality Date    TUBAL LIGATION  1983       Current Outpatient Medications:     Blood Pressure KIT, 1 each by Does not apply route daily, Disp: 1 kit, Rfl: 0    Incontinence Supply Disposable (INCONTINENCE BRIEF MEDIUM) MISC, 1 each by Does not apply route 3 times daily, Disp: 4 Package, Rfl: 3    atorvastatin (LIPITOR) 40 MG tablet, Take 1 tablet by mouth nightly, Disp: 30 tablet, Rfl: 3    lisinopril (PRINIVIL;ZESTRIL) 5 MG tablet, Take 1 tablet by mouth daily, Disp: 30 tablet, Rfl: 3    clopidogrel (PLAVIX) 75 MG tablet, Take 1 tablet by mouth daily, Disp: 30 tablet, Rfl: 3  No Known Allergies  Social History     Socioeconomic History    Marital status:       Spouse name: Not on file    Number of children: Not on file    Years of education: Not on file    Highest education level: Not on file   Occupational History    Not on file   Tobacco Use    Smoking status: Current Every Day Smoker     Packs/day: 0.25     Years: 38.00     Pack years: 9.50     Types: Cigarettes    Smokeless tobacco: Never Used    Tobacco comment: Wants to quit on her own first   Substance and Sexual Activity    cramping in legs/feet with walking. Respiratory: (-) SOB, (-) Coughing, (-) night sweats. GI: (-) nausea, (-) vomiting, (-) diarrhea, (-) blood in stool, (-) gastric ulcer. Psychiatric: (-) Depression, (-) Anxiety, (-) bipolar disease, (-) Alzheimer's Disease  Allergic/Immunologic: (-) allergies latex, (-) allergies metal, (-) skin sensitivity. Hematlogic: (-) anemia, (-) blood transfusion, (-) DVT/PE, (-) Clotting disorders      EXAM:      Constitution:  There were no vitals filed for this visit. Psycihatric:    The patient is alert and oriented x 3, appears to be stated age and in no distress. Respiratory:    Respiratory effort is not labored. Patient is not gasping. Palpation of the chest reveals no tactile fremitus. Skin:    Upon inspection: the skin appears warm, dry and intact. There is not a previous scar over the affected area. There is not any cellulitis, lymphedema or cutaneous lesions noted in the lower extremities. Upon palpation there is no induration noted. Neurologic:      Motor exam of the lower extremities show ; quadriceps, hamstrings, foot dorsi and plantar flexors intact R.  5/5 and L. 5/5. Deep tendon reflexes are 2/4 at the knees and 2/4 at the ankles with strong extensor hallicus longus motor strength bilaterally. Sensory to both feet is intact to all sensory roots. Cardiovascular: The vascular exam is normal and is well perfused to distal extremities. Distal pulses DP/PT: R. 2+; L. 2+. There is cap refill noted less than two seconds in all digits. There is not edema of the bilateral lower extremities. There is not varicosities noted in the distal extremities. Lymph:    Upon palpation,  there is no lymphadenopathy noted in bilateral lower extremities. Musculoskeletal:    Gait: antalgic; examination of the nails and digits reveal no cyanosis or clubbing. Knee exam - bilateral knee exam shows;  range of motion of R. Knee is 0 to 140, and L. Knee is 0 to 140. The patient does not have  pain on motion, there is not an effusion, there is not tenderness over the  global region, there are not any masses, there is not ligamentous instability, there is not  deformity noted. Knee exam: knee reveals normal exam, no swelling, tenderness, instability; ligaments intact, FROM. Ankle Exam:    Upon inspection and palpation of the Left ankle,  there is not deformity noted,  moderate swelling, moderate ecchymosis, has pain on palpation of left proximal 5th metatarsal.  ROM Left: Limited Secondary to pain; Right ankle : DF20; PF 40;  INV 30, MARIA EUGENIA 10. This exam was compared bilaterally. Right Ankle:   (-) Anterior Drawer ,  (-) Posterior Drawer ,  (-) Squeeze test,  (-) External Rotation, (-) Eversion test , (-) Dobbins Test     Left ankle:   (-) Anterior Drawer ,(-)  Posterior Drawer ,(-) Squeeze test,(-) External Rotation (-) Eversion test, (-) Dobbins Test.      Foot exam- visual inspection reveals warm, good capillary refill, there is negative  pain to palpation over the metatarsals. ROM inversion/eversion full range of motion, abduction/adduction full range of motion, ROM in MTP/PIP/DIP full range of motion. Xrays:      XR FOOT LEFT (MIN 3 VIEWS)    Result Date: 2021  EXAMINATION: THREE XRAY VIEWS OF THE LEFT FOOT 2021 1:03 pm COMPARISON: None. HISTORY: ORDERING SYSTEM PROVIDED HISTORY: foot injury and edema TECHNOLOGIST PROVIDED HISTORY: Reason for exam:->foot injury and edema FINDINGS: There is a displaced spiral fracture involving the left 5th metatarsal. There is significant soft tissue swelling in the dorsum of the foot left foot and ankle. Displaced spiral fracture of the left 5th metatarsal with significant soft tissue swelling in the left ankle and foot.      US DUP LOWER EXTREMITIES BILATERAL VENOUS    Result Date: 2021  Patient MRN:  [id number] : 1955 Age: 73 years Gender: Female Order Date:  2021 11:29 AM EXAM: US DUP LOWER EXTREMITIES BILATERAL VENOUS NUMBER OF IMAGES:  61 INDICATION:  r/o DVT r/o DVT What reading provider will be dictating this exam?->[facility] COMPARISON: None There is evidence for deep venous thrombosis in the left lower extremity involving the posterior tibial vein There is otherwise good compressibility, there is good augmentation, there is good color flow. There is evidence for deep venous thrombosis in the left posterior tibial vein ALERT:  THIS IS AN ABNORMAL REPORT         Radiographic findings reviewed with patient      Impression:  Jung Gross was seen today for ed follow-up. Diagnoses and all orders for this visit:    Closed nondisplaced fracture of fifth metatarsal bone of left foot, initial encounter    Tobacco smoke exposure        Patient seen and examined. X-rays reviewed. Natural history and course discussed with patient. Treatment options discussed with patient in detail including risks and benefits. Patient should do well with conservative management at this time. Plan:Natural history and expected course discussed. Questions answered. Rest, ice, compression, elevation (RICE) therapy. We will follow with x-rays. Educational materials distributed. Fit with cast boot for use over next 2-4 weeks. Home exercise plan outlined. OTC analgesics as needed. PT referral.  F/u in 4-6 weeks  Continue with Plavix for DVT prophylaxis    In a 15 minute assessment and discussion, patient was provided and fitted for a removable cast boot distributed and applied. She was educated in detail how to use cast boot and the importance of use as well as range of motion and HEP exercises. In a 15 minute assessment and discussion, patient was provided and fitted for a removable cast boot distributed and applied. She was educated in detail how to use cast boot and the importance of use as well as range of motion and HEP exercises. Patient educated about the healing rates with this condition. Patient does smoke and does have secondhand smoke exposure. In this discussion of approximately 5 minutes, patient was counseled about decrease in smoking exposure regards to healing and overall good health. Patient seems reluctant but is willing to listen to the discussion in detail. She states that she will try to cut down as much as possible and states that she will try to quit completely by the next visit. 45 minutes was spent with patient. 50% or greater was spent counseling the patient.

## 2021-06-24 DIAGNOSIS — S92.355A CLOSED NONDISPLACED FRACTURE OF FIFTH METATARSAL BONE OF LEFT FOOT, INITIAL ENCOUNTER: Primary | ICD-10-CM

## 2021-06-25 ENCOUNTER — OFFICE VISIT (OUTPATIENT)
Dept: ORTHOPEDIC SURGERY | Age: 66
End: 2021-06-25

## 2021-06-25 VITALS — BODY MASS INDEX: 31.71 KG/M2 | TEMPERATURE: 98 F | WEIGHT: 179 LBS | HEIGHT: 63 IN

## 2021-06-25 DIAGNOSIS — Z77.22 TOBACCO SMOKE EXPOSURE: ICD-10-CM

## 2021-06-25 DIAGNOSIS — S92.355A CLOSED NONDISPLACED FRACTURE OF FIFTH METATARSAL BONE OF LEFT FOOT, INITIAL ENCOUNTER: Primary | ICD-10-CM

## 2021-06-25 PROCEDURE — G8417 CALC BMI ABV UP PARAM F/U: HCPCS | Performed by: ORTHOPAEDIC SURGERY

## 2021-06-25 PROCEDURE — 3017F COLORECTAL CA SCREEN DOC REV: CPT | Performed by: ORTHOPAEDIC SURGERY

## 2021-06-25 PROCEDURE — G8427 DOCREV CUR MEDS BY ELIG CLIN: HCPCS | Performed by: ORTHOPAEDIC SURGERY

## 2021-06-25 PROCEDURE — 1090F PRES/ABSN URINE INCON ASSESS: CPT | Performed by: ORTHOPAEDIC SURGERY

## 2021-06-25 PROCEDURE — 1123F ACP DISCUSS/DSCN MKR DOCD: CPT | Performed by: ORTHOPAEDIC SURGERY

## 2021-06-25 PROCEDURE — 99214 OFFICE O/P EST MOD 30 MIN: CPT | Performed by: ORTHOPAEDIC SURGERY

## 2021-06-25 PROCEDURE — 4040F PNEUMOC VAC/ADMIN/RCVD: CPT | Performed by: ORTHOPAEDIC SURGERY

## 2021-06-25 PROCEDURE — 4004F PT TOBACCO SCREEN RCVD TLK: CPT | Performed by: ORTHOPAEDIC SURGERY

## 2021-06-25 PROCEDURE — G8400 PT W/DXA NO RESULTS DOC: HCPCS | Performed by: ORTHOPAEDIC SURGERY

## 2021-06-25 NOTE — PROGRESS NOTES
Chief Complaint   Patient presents with    Foot Pain     Left foot fracture of fifth metatarsal bone. Foot is feeling good. Pain mostly in calf. Natalee Goldstein is a 73 y.o. female who presents today with a Left foot injury. The injury occurred 5/27/2021. The history of injury was from fall. The patient complains of pain over lateral foot. The intensity is 4/10. The pain is described as: sharp. Previous treatment includes: rest, ice, heat, NSAIDs, HEP without much relief. .      Past Medical History:   Diagnosis Date    Acute gastroenteritis 03/2010    Cerebral artery occlusion with cerebral infarction ([facility] [address]     Chronic back pain     Headache(784.0)     Hyperlipidemia 03/2010    Hypertension     Obesity     Osteoarthritis     Tobacco abuse      Past Surgical History:   Procedure Laterality Date    TUBAL LIGATION  1983       Current Outpatient Medications:     Blood Pressure KIT, 1 each by Does not apply route daily, Disp: 1 kit, Rfl: 0    Incontinence Supply Disposable (INCONTINENCE BRIEF MEDIUM) MISC, 1 each by Does not apply route 3 times daily, Disp: 4 Package, Rfl: 3    atorvastatin (LIPITOR) 40 MG tablet, Take 1 tablet by mouth nightly, Disp: 30 tablet, Rfl: 3    lisinopril (PRINIVIL;ZESTRIL) 5 MG tablet, Take 1 tablet by mouth daily, Disp: 30 tablet, Rfl: 3    clopidogrel (PLAVIX) 75 MG tablet, Take 1 tablet by mouth daily, Disp: 30 tablet, Rfl: 3  No Known Allergies  Social History     Socioeconomic History    Marital status:       Spouse name: Not on file    Number of children: Not on file    Years of education: Not on file    Highest education level: Not on file   Occupational History    Not on file   Tobacco Use    Smoking status: Current Every Day Smoker     Packs/day: 0.25     Years: 38.00     Pack years: 9.50     Types: Cigarettes    Smokeless tobacco: Never Used    Tobacco comment: Wants to quit on her own first   Substance and Sexual Activity    Alcohol use: No    Drug use: No    Sexual activity: Never   Other Topics Concern    Not on file   Social History Narrative    Not on file     Social Determinants of Health     Financial Resource Strain:     Difficulty of Paying Living Expenses:    Food Insecurity:     Worried About Running Out of Food in the Last Year:     [address] in the Last Year:    Transportation Needs:     Lack of Transportation (Medical):  Lack of Transportation (Non-Medical):    Physical Activity:     Days of Exercise per Week:     Minutes of Exercise per Session:    Stress:     Feeling of Stress :    Social Connections:     Frequency of Communication with Friends and Family:     Frequency of Social Gatherings with Friends and Family:     Attends [employer] Services:     Active Member of Clubs or Organizations:     Attends Club or Organization Meetings:     Marital Status:    Intimate Partner Violence:     Fear of Current or Ex-Partner:     Emotionally Abused:     Physically Abused:     Sexually Abused:      Family History   Problem Relation Age of Onset    Diabetes Sister     High Blood Pressure Sister     Cancer Sister         Thryoid    Diabetes Sister     Heart Disease Sister 35        Heart Failure    Cancer Brother     Diabetes Brother     Heart Disease Brother     High Blood Pressure Brother     High Cholesterol Brother        REVIEW OF SYSTEMS:     General/Constitution:  (-)weight loss, (-)fever, (-)chills, (-)weakness. Skin: (-) rash,(-) psoriasis,(-) eczema, (-)skin cancer. Musculoskeletal: (-) fractures,  (-) dislocations,(-) collagen vascular disease, (-) fibromyalgia, (-) multiple sclerosis, (-) muscular dystrophy, (-) RSD,(-) joint pain (-)swelling, (-) joint pain,swelling. Neurologic: (-) epilepsy, (-)seizures,(-) brain tumor,(-) TIA, (-)stroke, (-)headaches, (-)Parkinson disease,(-) memory loss, (-) LOC.   Cardiovascular: (-) Chest pain, (-) swelling in legs/feet, (-) SOB, (-) cramping in legs/feet with walking. Respiratory: (-) SOB, (-) Coughing, (-) night sweats. GI: (-) nausea, (-) vomiting, (-) diarrhea, (-) blood in stool, (-) gastric ulcer. Psychiatric: (-) Depression, (-) Anxiety, (-) bipolar disease, (-) Alzheimer's Disease  Allergic/Immunologic: (-) allergies latex, (-) allergies metal, (-) skin sensitivity. Hematlogic: (-) anemia, (-) blood transfusion, (-) DVT/PE, (-) Clotting disorders      EXAM:      Constitution:  Vitals:    06/25/21 1001   Temp: 98 °F (36.7 °C)         Psycihatric:    The patient is alert and oriented x 3, appears to be stated age and in no distress. Respiratory:    Respiratory effort is not labored. Patient is not gasping. Palpation of the chest reveals no tactile fremitus. Skin:    Upon inspection: the skin appears warm, dry and intact. There is not a previous scar over the affected area. There is not any cellulitis, lymphedema or cutaneous lesions noted in the lower extremities. Upon palpation there is no induration noted. Neurologic:      Motor exam of the lower extremities show ; quadriceps, hamstrings, foot dorsi and plantar flexors intact R.  5/5 and L. 5/5. Deep tendon reflexes are 2/4 at the knees and 2/4 at the ankles with strong extensor hallicus longus motor strength bilaterally. Sensory to both feet is intact to all sensory roots. Cardiovascular: The vascular exam is normal and is well perfused to distal extremities. Distal pulses DP/PT: R. 2+; L. 2+. There is cap refill noted less than two seconds in all digits. There is not edema of the bilateral lower extremities. There is not varicosities noted in the distal extremities. Lymph:    Upon palpation,  there is no lymphadenopathy noted in bilateral lower extremities. Musculoskeletal:    Gait: antalgic; examination of the nails and digits reveal no cyanosis or clubbing.       Knee exam - bilateral knee exam shows;  range of motion of R. Knee is 0 to 140, and L. Knee is 0 to 140. The patient does not have  pain on motion, there is not an effusion, there is not tenderness over the  global region, there are not any masses, there is not ligamentous instability, there is not  deformity noted. Knee exam: knee reveals normal exam, no swelling, tenderness, instability; ligaments intact, FROM. Ankle Exam:    Upon inspection and palpation of the Left ankle,  there is not deformity noted,  moderate swelling, moderate ecchymosis, has pain on palpation of left proximal 5th metatarsal.  ROM Left: Limited Secondary to pain; Right ankle : DF20; PF 40;  INV 30, MARIA EUGENIA 10. This exam was compared bilaterally. Right Ankle:   (-) Anterior Drawer ,  (-) Posterior Drawer ,  (-) Squeeze test,  (-) External Rotation, (-) Eversion test , (-) Dobbins Test     Left ankle:   (-) Anterior Drawer ,(-)  Posterior Drawer ,(-) Squeeze test,(-) External Rotation (-) Eversion test, (-) Dobbins Test.      Foot exam- visual inspection reveals warm, good capillary refill, there is negative  pain to palpation over the metatarsals. ROM inversion/eversion full range of motion, abduction/adduction full range of motion, ROM in MTP/PIP/DIP full range of motion. Xrays:      XR FOOT LEFT (MIN 3 VIEWS)    Result Date: 5/27/2021  EXAMINATION: THREE XRAY VIEWS OF THE LEFT FOOT 5/27/2021 1:03 pm COMPARISON: None. HISTORY: ORDERING SYSTEM PROVIDED HISTORY: foot injury and edema TECHNOLOGIST PROVIDED HISTORY: Reason for exam:->foot injury and edema FINDINGS: There is a displaced spiral fracture involving the left 5th metatarsal. There is significant soft tissue swelling in the dorsum of the foot left foot and ankle. Displaced spiral fracture of the left 5th metatarsal with significant soft tissue swelling in the left ankle and foot.      XR FOOT LEFT (MIN 3 VIEWS)    Result Date: 6/27/2021  EXAMINATION: THREE XRAY VIEWS OF THE LEFT FOOT 6/25/2021 9:46 am COMPARISON: May 27, 2021 HISTORY: [address] PROVIDED HISTORY: Closed nondisplaced fracture of fifth metatarsal bone of left foot, initial encounter FINDINGS: There is increasing callus formation around the fracture site of the 5th metatarsal in anatomic alignment. No new or acute fractures. Improving soft tissue swelling. Calcaneal spur noted. Healing 5th metatarsal fracture. Radiographic findings reviewed with patient      Impression:  [facility] Diagnoses and all orders for this visit:    Closed nondisplaced fracture of fifth metatarsal bone of left foot, initial encounter    Tobacco smoke exposure        Patient seen and examined. X-rays reviewed. Natural history and course discussed with patient. Treatment options discussed with patient in detail including risks and benefits. Patient should do well with conservative management at this time. Plan:Natural history and expected course discussed. Questions answered. Rest, ice, compression, elevation (RICE) therapy. We will follow with x-rays. Educational materials distributed. Fit with cast boot for use over next 2-4 weeks. Home exercise plan outlined. OTC analgesics as needed. PT referral.  F/u in 4-6 weeks  Continue with Plavix for DVT prophylaxis    In a 15 minute assessment and discussion, patient was provided and fitted for a removable cast boot distributed and applied. She was educated in detail how to use cast boot and the importance of use as well as range of motion and HEP exercises. In a 15 minute assessment and discussion, patient was provided and fitted for a removable postop shoe distributed and applied. She was educated in detail how to use cast boot and the importance of use as well as range of motion and HEP exercises. Patient educated about the healing rates with this condition. Patient does smoke and does have secondhand smoke exposure.  In this discussion of approximately 5 minutes, patient was counseled about decrease in smoking exposure regards to healing and overall good health. Patient seems reluctant but is willing to listen to the discussion in detail. She states that she will try to cut down as much as possible and states that she will try to quit completely by the next visit. 25 minutes was spent with patient. 50% or greater was spent counseling the patient.

## 2021-07-14 ENCOUNTER — OFFICE VISIT (OUTPATIENT)
Dept: FAMILY MEDICINE CLINIC | Age: 66
End: 2021-07-14
Payer: MEDICARE

## 2021-07-14 VITALS
SYSTOLIC BLOOD PRESSURE: 128 MMHG | HEART RATE: 99 BPM | DIASTOLIC BLOOD PRESSURE: 62 MMHG | OXYGEN SATURATION: 98 % | BODY MASS INDEX: 30.09 KG/M2 | RESPIRATION RATE: 18 BRPM | HEIGHT: 63 IN | WEIGHT: 169.8 LBS | TEMPERATURE: 97.9 F

## 2021-07-14 DIAGNOSIS — E78.49 OTHER HYPERLIPIDEMIA: ICD-10-CM

## 2021-07-14 DIAGNOSIS — R32 URINARY INCONTINENCE, UNSPECIFIED TYPE: ICD-10-CM

## 2021-07-14 DIAGNOSIS — D64.9 ANEMIA, UNSPECIFIED TYPE: ICD-10-CM

## 2021-07-14 DIAGNOSIS — I63.411 CEREBROVASCULAR ACCIDENT (CVA) DUE TO EMBOLISM OF RIGHT MIDDLE CEREBRAL ARTERY (HCC): ICD-10-CM

## 2021-07-14 DIAGNOSIS — E78.49 OTHER HYPERLIPIDEMIA: Primary | ICD-10-CM

## 2021-07-14 LAB
ANISOCYTOSIS: ABNORMAL
BASOPHILS ABSOLUTE: 0.06 E9/L (ref 0–0.2)
BASOPHILS RELATIVE PERCENT: 0.8 % (ref 0–2)
BURR CELLS: ABNORMAL
CHOLESTEROL, TOTAL: 97 MG/DL (ref 0–199)
EOSINOPHILS ABSOLUTE: 0.31 E9/L (ref 0.05–0.5)
EOSINOPHILS RELATIVE PERCENT: 4 % (ref 0–6)
HCT VFR BLD CALC: 29.2 % (ref 34–48)
HDLC SERPL-MCNC: 25 MG/DL
HEMOGLOBIN: 7.9 G/DL (ref 11.5–15.5)
HOWELL-JOLLY BODIES: ABNORMAL
IMMATURE GRANULOCYTES #: 0.03 E9/L
IMMATURE GRANULOCYTES %: 0.4 % (ref 0–5)
LDL CHOLESTEROL CALCULATED: 55 MG/DL (ref 0–99)
LYMPHOCYTES ABSOLUTE: 1.07 E9/L (ref 1.5–4)
LYMPHOCYTES RELATIVE PERCENT: 13.7 % (ref 20–42)
MCH RBC QN AUTO: 19.5 PG (ref 26–35)
MCHC RBC AUTO-ENTMCNC: 27.1 % (ref 32–34.5)
MCV RBC AUTO: 71.9 FL (ref 80–99.9)
MONOCYTES ABSOLUTE: 0.54 E9/L (ref 0.1–0.95)
MONOCYTES RELATIVE PERCENT: 6.9 % (ref 2–12)
NEUTROPHILS ABSOLUTE: 5.78 E9/L (ref 1.8–7.3)
NEUTROPHILS RELATIVE PERCENT: 74.2 % (ref 43–80)
OVALOCYTES: ABNORMAL
PDW BLD-RTO: 20.3 FL (ref 11.5–15)
PLATELET # BLD: 301 E9/L (ref 130–450)
PMV BLD AUTO: 8.7 FL (ref 7–12)
POIKILOCYTES: ABNORMAL
POLYCHROMASIA: ABNORMAL
RBC # BLD: 4.06 E12/L (ref 3.5–5.5)
TRIGL SERPL-MCNC: 84 MG/DL (ref 0–149)
VLDLC SERPL CALC-MCNC: 17 MG/DL
WBC # BLD: 7.8 E9/L (ref 4.5–11.5)

## 2021-07-14 PROCEDURE — 1090F PRES/ABSN URINE INCON ASSESS: CPT | Performed by: INTERNAL MEDICINE

## 2021-07-14 PROCEDURE — G8417 CALC BMI ABV UP PARAM F/U: HCPCS | Performed by: INTERNAL MEDICINE

## 2021-07-14 PROCEDURE — 1123F ACP DISCUSS/DSCN MKR DOCD: CPT | Performed by: INTERNAL MEDICINE

## 2021-07-14 PROCEDURE — 0509F URINE INCON PLAN DOCD: CPT | Performed by: INTERNAL MEDICINE

## 2021-07-14 PROCEDURE — 4004F PT TOBACCO SCREEN RCVD TLK: CPT | Performed by: INTERNAL MEDICINE

## 2021-07-14 PROCEDURE — 4040F PNEUMOC VAC/ADMIN/RCVD: CPT | Performed by: INTERNAL MEDICINE

## 2021-07-14 PROCEDURE — 99213 OFFICE O/P EST LOW 20 MIN: CPT | Performed by: INTERNAL MEDICINE

## 2021-07-14 PROCEDURE — 3017F COLORECTAL CA SCREEN DOC REV: CPT | Performed by: INTERNAL MEDICINE

## 2021-07-14 PROCEDURE — G8427 DOCREV CUR MEDS BY ELIG CLIN: HCPCS | Performed by: INTERNAL MEDICINE

## 2021-07-14 PROCEDURE — G8400 PT W/DXA NO RESULTS DOC: HCPCS | Performed by: INTERNAL MEDICINE

## 2021-07-14 RX ORDER — ATORVASTATIN CALCIUM 40 MG/1
40 TABLET, FILM COATED ORAL NIGHTLY
Qty: 30 TABLET | Refills: 3 | Status: SHIPPED
Start: 2021-07-14 | End: 2021-10-19 | Stop reason: SDUPTHER

## 2021-07-14 RX ORDER — CLOPIDOGREL BISULFATE 75 MG/1
75 TABLET ORAL DAILY
Qty: 30 TABLET | Refills: 3 | Status: ON HOLD
Start: 2021-07-14 | End: 2021-08-28 | Stop reason: HOSPADM

## 2021-07-14 RX ORDER — LISINOPRIL 5 MG/1
5 TABLET ORAL DAILY
Qty: 30 TABLET | Refills: 3 | Status: SHIPPED
Start: 2021-07-14 | End: 2021-10-19 | Stop reason: SDUPTHER

## 2021-07-14 SDOH — ECONOMIC STABILITY: FOOD INSECURITY: WITHIN THE PAST 12 MONTHS, THE FOOD YOU BOUGHT JUST DIDN'T LAST AND YOU DIDN'T HAVE MONEY TO GET MORE.: NEVER TRUE

## 2021-07-14 SDOH — ECONOMIC STABILITY: FOOD INSECURITY: WITHIN THE PAST 12 MONTHS, YOU WORRIED THAT YOUR FOOD WOULD RUN OUT BEFORE YOU GOT MONEY TO BUY MORE.: NEVER TRUE

## 2021-07-14 ASSESSMENT — SOCIAL DETERMINANTS OF HEALTH (SDOH): HOW HARD IS IT FOR YOU TO PAY FOR THE VERY BASICS LIKE FOOD, HOUSING, MEDICAL CARE, AND HEATING?: NOT HARD AT ALL

## 2021-07-14 NOTE — PROGRESS NOTES
[facility] PRIMARY [facility]  [address] [place] [id number]  Dept: [phone]  Dept Fax: [phone]     NAME: Damon oHlloway        :  1955        MRN:  <[id number]>    Chief Complaint   Patient presents with   Everlene Sae Check-Up     needing incontinent supplies,       Subjective     History of Present Illness  Damon Holloway is a 73 y.o. female who presents today for routine follow up and medication refill. Patient states that she has been doing well on her current regimen. She remains compliant with all medications. Patient is due for a renewal of her prescription for incontinence supplies. Review of Systems  Please see HPI above. All bolded are positive.   Gen: fever, chills, fatigue, weakness, diaphoresis, unintentional weight change  Head: headache, vision change, hearing loss  Chest: chest pain/heaviness, palpitations  Lungs: shortness of breath, wheezing, coughing, hemoptysis  Abdomen: abdominal pain, nausea, vomiting, diarrhea, constipation, melena, hematochezia, hematemesis, loss of appetite  Extremities: lower extremity edema, myalgias, arthralgias  Urinary: dysuria, hematuria, weak flow, increase in frequency, urinary incontinence  Neurologic: lightheadedness, dizziness, confusion, syncope  Endocrine: polydipsia, polyuria, heat or cold intolerance  Psychiatric: depression, suicidal ideation, anxiety  Derm: Rashes, ulcers, burns    Past Medical Hx:  Past Medical History:   Diagnosis Date    Acute gastroenteritis 2010    Cerebral artery occlusion with cerebral infarction ([facility] [address]     Chronic back pain     Headache(784.0)     Hyperlipidemia 2010    Hypertension     Obesity     Osteoarthritis     Tobacco abuse        Past Surgical Hx:  Past Surgical History:   Procedure Laterality Date    TUBAL LIGATION         Family Hx:  Family History   Problem Relation Age of Onset    Diabetes Sister     High Blood Pressure Sister     Cancer Sister         Thryoid    Diabetes Sister     Heart Disease Sister 28        Heart Failure    Cancer Brother     Diabetes Brother     Heart Disease Brother     High Blood Pressure Brother     High Cholesterol Brother        Social Hx:  Social History     Tobacco Use    Smoking status: Current Every Day Smoker     Packs/day: 1.00     Years: 48.00     Pack years: 48.00     Types: Cigarettes    Smokeless tobacco: Never Used    Tobacco comment: Wants to quit on her own first   Substance Use Topics    Alcohol use: No       Home Medications:  Current Outpatient Medications   Medication Sig Dispense Refill    Incontinence Supply Disposable MISC Large size 90 each 5    atorvastatin (LIPITOR) 40 MG tablet Take 1 tablet by mouth nightly 30 tablet 3    clopidogrel (PLAVIX) 75 MG tablet Take 1 tablet by mouth daily 30 tablet 3    lisinopril (PRINIVIL;ZESTRIL) 5 MG tablet Take 1 tablet by mouth daily 30 tablet 3    Blood Pressure KIT 1 each by Does not apply route daily 1 kit 0     No current facility-administered medications for this visit. Allergies:  No Known Allergies    Objective     Vitals:    07/14/21 0810   BP: 128/62   Pulse: 99   Resp: 18   Temp: 97.9 °F (36.6 °C)   TempSrc: Oral   SpO2: 98%   Weight: 169 lb 12.8 oz (77 kg)   Height: 5' 3\" (1.6 m)        Physical Exam  General: Awake, alert, and oriented to person, place, time, and purpose, appears stated age and cooperative, No acute distress  Head: Normocephalic, atraumatic  Eyes: conjunctivae/corneas clear, EOMI  Mouth: Mucous membranes moist with no pharyngeal exudate or erythema  Neck: no JVD, no adenopathy, no carotid bruit, supple, symmetrical, trachea midline  Back: symmetric, ROM normal, No CVA tenderness.   Lungs: clear to auscultation bilaterally without wheezes, rales, or rhonchi  Heart: regular rate and rhythm, S1, S2 normal, no murmur, click, rub or gallop  Abdomen: soft, non-tender; bowel sounds normal; no masses,  no organomegaly  Extremities: atraumatic, no cyanosis, no edema, 2+ pulses palpated in all 4 extremities  Skin: color, texture, turgor within normal limits. No rashes or lesions or normal  Neurologic:speech appropriate, moves all 4 extremities, normal muscle strength and tone, CN 2-12 grossly intact    Labs:  Lab Results   Component Value Date    WBC 7.8 07/14/2021    HGB 7.9 (L) 07/14/2021    HCT 29.2 (L) 07/14/2021     07/14/2021     05/04/2021    K 3.6 05/04/2021     05/04/2021    CREATININE 0.8 05/04/2021    BUN 15 05/04/2021    CO2 23 05/04/2021    GLUCOSE 96 05/04/2021    ALT 7 05/04/2021    AST 10 05/04/2021    INR 1.2 04/26/2021     Lab Results   Component Value Date    TSH 0.354 04/27/2021     Lab Results   Component Value Date    TRIG 84 07/14/2021    TRIG 125 04/27/2021    TRIG 168 (H) 06/12/2012     Lab Results   Component Value Date    HDL 25 07/14/2021    HDL 30 04/27/2021    HDL 35.0 (A) 06/12/2012     Lab Results   Component Value Date    LDLCALC 55 07/14/2021    LDLCALC 101 (H) 04/27/2021    LDLCALC 157 (H) 06/12/2012     Lab Results   Component Value Date    LABA1C 5.7 (H) 04/27/2021     Lab Results   Component Value Date    INR 1.2 04/26/2021    PROTIME 12.9 (H) 04/26/2021      *All recent labs were reviewed. Please see electronic chart for a more comprehensive set of labs    Radiology:  XR FOOT LEFT (MIN 3 VIEWS)    Result Date: 6/27/2021  EXAMINATION: THREE XRAY VIEWS OF THE LEFT FOOT 6/25/2021 9:46 am COMPARISON: May 27, 2021 HISTORY: ORDERING SYSTEM PROVIDED HISTORY: Closed nondisplaced fracture of fifth metatarsal bone of left foot, initial encounter FINDINGS: There is increasing callus formation around the fracture site of the 5th metatarsal in anatomic alignment. No new or acute fractures. Improving soft tissue swelling. Calcaneal spur noted. Healing 5th metatarsal fracture.        Assessment and Plan     Patient is a 73 y.o. female who presented to the office for follow-up. Full problem list is as follows:  Patient Active Problem List   Diagnosis    Tobacco abuse    Hyperlipidemia    Stroke-like symptoms    Encephalopathy acute    Cerebrovascular accident (CVA) due to embolism of right middle cerebral artery ([address]    Acute deep vein thrombosis (DVT) of calf muscle vein of left lower extremity (HCC)    PFO (patent foramen ovale)       Yesika Rousseau was seen today for check-up. Diagnoses and all orders for this visit:    Other hyperlipidemia  -     Lipid Panel; Future  -     atorvastatin (LIPITOR) 40 MG tablet; Take 1 tablet by mouth nightly    Urinary incontinence, unspecified type  -     Incontinence Supply Disposable MISC; Large size    Anemia, unspecified type  -     CBC Auto Differential; Future    Cerebrovascular accident (CVA) due to embolism of right middle cerebral artery (HCC)  -     clopidogrel (PLAVIX) 75 MG tablet; Take 1 tablet by mouth daily  -     lisinopril (PRINIVIL;ZESTRIL) 5 MG tablet; Take 1 tablet by mouth daily        Educational materials and/or home exercises printed for patient's review and were included in patient instructions on his/her After Visit Summary and given to patient at the end of visit. Counseled regarding above diagnosis, including possible risks and complications, especially if left uncontrolled. Counseled regarding the possible side effects, risks, benefits and alternatives to treatment; patient and/or guardian verbalizes understanding, agrees, feels comfortable with and wishes to proceed with above treatment plan. Advised patient to call [facility] new medication issues, and read all Rx info from pharmacy to assure aware of all possible risks and side effects of any medication before taking. Reviewed age and gender appropriate health screening exams and vaccinations.   Advised patient regarding importance of keeping up with recommended health maintenance and to schedule as soon as possible if overdue, as this is important in assessing for undiagnosed pathology, especially cancer, as well as protecting against potentially harmful/life threatening disease. Patient verbalizes understanding and agrees with above counseling, assessment and plan. All questions answered.     Nabor Prakash DO   7/16/2021  4:24 PM

## 2021-07-21 ENCOUNTER — OFFICE VISIT (OUTPATIENT)
Dept: ORTHOPEDIC SURGERY | Age: 66
End: 2021-07-21
Payer: MEDICARE

## 2021-07-21 VITALS — BODY MASS INDEX: 29.95 KG/M2 | HEIGHT: 63 IN | WEIGHT: 169 LBS

## 2021-07-21 DIAGNOSIS — Z77.22 TOBACCO SMOKE EXPOSURE: ICD-10-CM

## 2021-07-21 DIAGNOSIS — D64.9 ANEMIA, UNSPECIFIED TYPE: Primary | ICD-10-CM

## 2021-07-21 DIAGNOSIS — S92.355A CLOSED NONDISPLACED FRACTURE OF FIFTH METATARSAL BONE OF LEFT FOOT, INITIAL ENCOUNTER: Primary | ICD-10-CM

## 2021-07-21 PROCEDURE — 1123F ACP DISCUSS/DSCN MKR DOCD: CPT | Performed by: ORTHOPAEDIC SURGERY

## 2021-07-21 PROCEDURE — 4040F PNEUMOC VAC/ADMIN/RCVD: CPT | Performed by: ORTHOPAEDIC SURGERY

## 2021-07-21 PROCEDURE — G8400 PT W/DXA NO RESULTS DOC: HCPCS | Performed by: ORTHOPAEDIC SURGERY

## 2021-07-21 PROCEDURE — 3017F COLORECTAL CA SCREEN DOC REV: CPT | Performed by: ORTHOPAEDIC SURGERY

## 2021-07-21 PROCEDURE — G8427 DOCREV CUR MEDS BY ELIG CLIN: HCPCS | Performed by: ORTHOPAEDIC SURGERY

## 2021-07-21 PROCEDURE — 4004F PT TOBACCO SCREEN RCVD TLK: CPT | Performed by: ORTHOPAEDIC SURGERY

## 2021-07-21 PROCEDURE — 1090F PRES/ABSN URINE INCON ASSESS: CPT | Performed by: ORTHOPAEDIC SURGERY

## 2021-07-21 PROCEDURE — G8417 CALC BMI ABV UP PARAM F/U: HCPCS | Performed by: ORTHOPAEDIC SURGERY

## 2021-07-21 PROCEDURE — 99213 OFFICE O/P EST LOW 20 MIN: CPT | Performed by: ORTHOPAEDIC SURGERY

## 2021-07-21 NOTE — PROGRESS NOTES
Chief Complaint   Patient presents with    Foot Pain     Left foot fx fu. DOI 5/27/2021       Juli Villarreal is a 73 y.o. female who presents today with a Left foot injury. The injury occurred 5/27/2021. The history of injury was from fall. The patient complains of pain over lateral foot. The intensity is 1/10. The pain is described as: sharp. Previous treatment includes: rest, ice, heat, NSAIDs, HEP without much relief. Past Medical History:   Diagnosis Date    Acute gastroenteritis 03/2010    Cerebral artery occlusion with cerebral infarction ([address]     Chronic back pain     Headache(784.0)     Hyperlipidemia 03/2010    Hypertension     Obesity     Osteoarthritis     Tobacco abuse      Past Surgical History:   Procedure Laterality Date    TUBAL LIGATION  1983       Current Outpatient Medications:     Incontinence Supply Disposable MISC, Large size, Disp: 90 each, Rfl: 5    atorvastatin (LIPITOR) 40 MG tablet, Take 1 tablet by mouth nightly, Disp: 30 tablet, Rfl: 3    clopidogrel (PLAVIX) 75 MG tablet, Take 1 tablet by mouth daily, Disp: 30 tablet, Rfl: 3    lisinopril (PRINIVIL;ZESTRIL) 5 MG tablet, Take 1 tablet by mouth daily, Disp: 30 tablet, Rfl: 3    Blood Pressure KIT, 1 each by Does not apply route daily, Disp: 1 kit, Rfl: 0  No Known Allergies  Social History     Socioeconomic History    Marital status:       Spouse name: Not on file    Number of children: Not on file    Years of education: Not on file    Highest education level: Not on file   Occupational History    Not on file   Tobacco Use    Smoking status: Current Every Day Smoker     Packs/day: 1.00     Years: 48.00     Pack years: 48.00     Types: Cigarettes    Smokeless tobacco: Never Used    Tobacco comment: Wants to quit on her own first   Substance and Sexual Activity    Alcohol use: No    Drug use: No    Sexual activity: Never   Other Topics Concern    Not on file   Social History Narrative    Not on file     Social Determinants of Health     Financial Resource Strain: Low Risk     Difficulty of Paying Living Expenses: Not hard at all   Food Insecurity: No Food Insecurity    Worried About Running Out of Food in the Last Year: Never true    Viri of Food in the Last Year: Never true   Transportation Needs:     Lack of Transportation (Medical):  Lack of Transportation (Non-Medical):    Physical Activity:     Days of Exercise per Week:     Minutes of Exercise per Session:    Stress:     Feeling of Stress :    Social Connections:     Frequency of Communication with Friends and Family:     Frequency of Social Gatherings with Friends and Family:     Attends Zoroastrian Services:     Active Member of Clubs or Organizations:     Attends Club or Organization Meetings:     Marital Status:    Intimate Partner Violence:     Fear of Current or Ex-Partner:     Emotionally Abused:     Physically Abused:     Sexually Abused:      Family History   Problem Relation Age of Onset    Diabetes Sister     High Blood Pressure Sister     Cancer Sister         Thryoid    Diabetes Sister     Heart Disease Sister 35        Heart Failure    Cancer Brother     Diabetes Brother     Heart Disease Brother     High Blood Pressure Brother     High Cholesterol Brother        REVIEW OF SYSTEMS:     General/Constitution:  (-)weight loss, (-)fever, (-)chills, (-)weakness. Skin: (-) rash,(-) psoriasis,(-) eczema, (-)skin cancer. Musculoskeletal: (-) fractures,  (-) dislocations,(-) collagen vascular disease, (-) fibromyalgia, (-) multiple sclerosis, (-) muscular dystrophy, (-) RSD,(-) joint pain (-)swelling, (-) joint pain,swelling. Neurologic: (-) epilepsy, (-)seizures,(-) brain tumor,(-) TIA, (-)stroke, (-)headaches, (-)Parkinson disease,(-) memory loss, (-) LOC. Cardiovascular: (-) Chest pain, (-) swelling in legs/feet, (-) SOB, (-) cramping in legs/feet with walking.   Respiratory: (-) SOB, (-) Coughing, (-) night sweats. GI: (-) nausea, (-) vomiting, (-) diarrhea, (-) blood in stool, (-) gastric ulcer. Psychiatric: (-) Depression, (-) Anxiety, (-) bipolar disease, (-) Alzheimer's Disease  Allergic/Immunologic: (-) allergies latex, (-) allergies metal, (-) skin sensitivity. Hematlogic: (-) anemia, (-) blood transfusion, (-) DVT/PE, (-) Clotting disorders      EXAM:      Constitution:  There were no vitals filed for this visit. Psycihatric:    The patient is alert and oriented x 3, appears to be stated age and in no distress. Respiratory:    Respiratory effort is not labored. Patient is not gasping. Palpation of the chest reveals no tactile fremitus. Skin:    Upon inspection: the skin appears warm, dry and intact. There is not a previous scar over the affected area. There is not any cellulitis, lymphedema or cutaneous lesions noted in the lower extremities. Upon palpation there is no induration noted. Neurologic:      Motor exam of the lower extremities show ; quadriceps, hamstrings, foot dorsi and plantar flexors intact R.  5/5 and L. 5/5. Deep tendon reflexes are 2/4 at the knees and 2/4 at the ankles with strong extensor hallicus longus motor strength bilaterally. Sensory to both feet is intact to all sensory roots. Cardiovascular: The vascular exam is normal and is well perfused to distal extremities. Distal pulses DP/PT: R. 2+; L. 2+. There is cap refill noted less than two seconds in all digits. There is not edema of the bilateral lower extremities. There is not varicosities noted in the distal extremities. Lymph:    Upon palpation,  there is no lymphadenopathy noted in bilateral lower extremities. Musculoskeletal:    Gait: antalgic; examination of the nails and digits reveal no cyanosis or clubbing. Knee exam - bilateral knee exam shows;  range of motion of R. Knee is 0 to 140, and L. Knee is 0 to 140.  The patient does not have  pain on motion, there is not an effusion, there is not tenderness over the  global region, there are not any masses, there is not ligamentous instability, there is not  deformity noted. Knee exam: knee reveals normal exam, no swelling, tenderness, instability; ligaments intact, FROM. Ankle Exam:    Upon inspection and palpation of the Left ankle,  there is not deformity noted,  No swelling, no ecchymosis, has no pain on palpation of left proximal 5th metatarsal.  ROM Left: DF20; PF 40;  INV 30, MARIA EUGENIA 10. This exam was compared bilaterally. Right Ankle:   (-) Anterior Drawer ,  (-) Posterior Drawer ,  (-) Squeeze test,  (-) External Rotation, (-) Eversion test , (-) Dobbins Test     Left ankle:   (-) Anterior Drawer ,(-)  Posterior Drawer ,(-) Squeeze test,(-) External Rotation (-) Eversion test, (-) Dobbins Test.      Foot exam- visual inspection reveals warm, good capillary refill, there is negative  pain to palpation over the metatarsals. ROM inversion/eversion full range of motion, abduction/adduction full range of motion, ROM in MTP/PIP/DIP full range of motion. Xrays:      XR FOOT LEFT (MIN 3 VIEWS)    Result Date: 5/27/2021  EXAMINATION: THREE XRAY VIEWS OF THE LEFT FOOT 5/27/2021 1:03 pm COMPARISON: None. HISTORY: ORDERING SYSTEM PROVIDED HISTORY: foot injury and edema TECHNOLOGIST PROVIDED HISTORY: Reason for exam:->foot injury and edema FINDINGS: There is a displaced spiral fracture involving the left 5th metatarsal. There is significant soft tissue swelling in the dorsum of the foot left foot and ankle. Displaced spiral fracture of the left 5th metatarsal with significant soft tissue swelling in the left ankle and foot.      XR FOOT LEFT (MIN 3 VIEWS)    Result Date: 6/27/2021  EXAMINATION: THREE XRAY VIEWS OF THE LEFT FOOT 6/25/2021 9:46 am COMPARISON: May 27, 2021 HISTORY: ORDERING SYSTEM PROVIDED HISTORY: Closed nondisplaced fracture of fifth metatarsal bone of left foot, initial encounter FINDINGS: There is

## 2021-08-16 ENCOUNTER — APPOINTMENT (OUTPATIENT)
Dept: CT IMAGING | Age: 66
DRG: 854 | End: 2021-08-16
Payer: MEDICARE

## 2021-08-16 ENCOUNTER — APPOINTMENT (OUTPATIENT)
Dept: GENERAL RADIOLOGY | Age: 66
DRG: 854 | End: 2021-08-16
Payer: MEDICARE

## 2021-08-16 ENCOUNTER — HOSPITAL ENCOUNTER (INPATIENT)
Age: 66
LOS: 11 days | Discharge: HOME HEALTH | DRG: 854 | End: 2021-08-28
Attending: EMERGENCY MEDICINE | Admitting: INTERNAL MEDICINE
Payer: MEDICARE

## 2021-08-16 DIAGNOSIS — R53.1 GENERALIZED WEAKNESS: ICD-10-CM

## 2021-08-16 DIAGNOSIS — C18.2 MALIGNANT NEOPLASM OF ASCENDING COLON (HCC): ICD-10-CM

## 2021-08-16 DIAGNOSIS — N39.0 URINARY TRACT INFECTION WITHOUT HEMATURIA, SITE UNSPECIFIED: Primary | ICD-10-CM

## 2021-08-16 DIAGNOSIS — R26.2 AMBULATORY DYSFUNCTION: ICD-10-CM

## 2021-08-16 LAB
ALBUMIN SERPL-MCNC: 3.3 G/DL (ref 3.5–5.2)
ALP BLD-CCNC: 110 U/L (ref 35–104)
ALT SERPL-CCNC: <5 U/L (ref 0–32)
ANION GAP SERPL CALCULATED.3IONS-SCNC: 12 MMOL/L (ref 7–16)
AST SERPL-CCNC: 13 U/L (ref 0–31)
BACTERIA: ABNORMAL /HPF
BASOPHILS ABSOLUTE: 0.02 E9/L (ref 0–0.2)
BASOPHILS RELATIVE PERCENT: 0.3 % (ref 0–2)
BILIRUB SERPL-MCNC: 0.7 MG/DL (ref 0–1.2)
BILIRUBIN DIRECT: <0.2 MG/DL (ref 0–0.3)
BILIRUBIN URINE: NEGATIVE
BILIRUBIN, INDIRECT: ABNORMAL MG/DL (ref 0–1)
BLOOD, URINE: ABNORMAL
BUN BLDV-MCNC: 10 MG/DL (ref 6–23)
BURR CELLS: ABNORMAL
CALCIUM SERPL-MCNC: 8.6 MG/DL (ref 8.6–10.2)
CHLORIDE BLD-SCNC: 94 MMOL/L (ref 98–107)
CLARITY: ABNORMAL
CO2: 26 MMOL/L (ref 22–29)
COLOR: YELLOW
CREAT SERPL-MCNC: 0.8 MG/DL (ref 0.5–1)
EOSINOPHILS ABSOLUTE: 0 E9/L (ref 0.05–0.5)
EOSINOPHILS RELATIVE PERCENT: 0 % (ref 0–6)
EPITHELIAL CELLS, UA: ABNORMAL /HPF
GFR AFRICAN AMERICAN: >60
GFR NON-AFRICAN AMERICAN: >60 ML/MIN/1.73
GLUCOSE BLD-MCNC: 181 MG/DL (ref 74–99)
GLUCOSE URINE: NEGATIVE MG/DL
HCT VFR BLD CALC: 28.7 % (ref 34–48)
HEMOGLOBIN: 8.2 G/DL (ref 11.5–15.5)
HYPOCHROMIA: ABNORMAL
IMMATURE GRANULOCYTES #: 0.04 E9/L
IMMATURE GRANULOCYTES %: 0.5 % (ref 0–5)
KETONES, URINE: NEGATIVE MG/DL
LACTIC ACID: 1.2 MMOL/L (ref 0.5–2.2)
LEUKOCYTE ESTERASE, URINE: ABNORMAL
LYMPHOCYTES ABSOLUTE: 0.29 E9/L (ref 1.5–4)
LYMPHOCYTES RELATIVE PERCENT: 3.9 % (ref 20–42)
MCH RBC QN AUTO: 19.5 PG (ref 26–35)
MCHC RBC AUTO-ENTMCNC: 28.6 % (ref 32–34.5)
MCV RBC AUTO: 68.3 FL (ref 80–99.9)
MONOCYTES ABSOLUTE: 0.33 E9/L (ref 0.1–0.95)
MONOCYTES RELATIVE PERCENT: 4.4 % (ref 2–12)
NEUTROPHILS ABSOLUTE: 6.77 E9/L (ref 1.8–7.3)
NEUTROPHILS RELATIVE PERCENT: 90.9 % (ref 43–80)
NITRITE, URINE: POSITIVE
OVALOCYTES: ABNORMAL
PDW BLD-RTO: 20.5 FL (ref 11.5–15)
PH UA: 6 (ref 5–9)
PLATELET # BLD: 298 E9/L (ref 130–450)
PMV BLD AUTO: 9 FL (ref 7–12)
POLYCHROMASIA: ABNORMAL
POTASSIUM SERPL-SCNC: 3.2 MMOL/L (ref 3.5–5)
PRO-BNP: 293 PG/ML (ref 0–125)
PROTEIN UA: NEGATIVE MG/DL
RBC # BLD: 4.2 E12/L (ref 3.5–5.5)
RBC UA: ABNORMAL /HPF (ref 0–2)
SARS-COV-2, NAAT: NOT DETECTED
SODIUM BLD-SCNC: 132 MMOL/L (ref 132–146)
SPECIFIC GRAVITY UA: 1.01 (ref 1–1.03)
TOTAL PROTEIN: 6.9 G/DL (ref 6.4–8.3)
TROPONIN, HIGH SENSITIVITY: 11 NG/L (ref 0–9)
TROPONIN, HIGH SENSITIVITY: 13 NG/L (ref 0–9)
UROBILINOGEN, URINE: 4 E.U./DL
WBC # BLD: 7.5 E9/L (ref 4.5–11.5)
WBC UA: >20 /HPF (ref 0–5)

## 2021-08-16 PROCEDURE — 81001 URINALYSIS AUTO W/SCOPE: CPT

## 2021-08-16 PROCEDURE — 93005 ELECTROCARDIOGRAM TRACING: CPT | Performed by: EMERGENCY MEDICINE

## 2021-08-16 PROCEDURE — 2580000003 HC RX 258: Performed by: EMERGENCY MEDICINE

## 2021-08-16 PROCEDURE — 73521 X-RAY EXAM HIPS BI 2 VIEWS: CPT

## 2021-08-16 PROCEDURE — 87635 SARS-COV-2 COVID-19 AMP PRB: CPT

## 2021-08-16 PROCEDURE — 70450 CT HEAD/BRAIN W/O DYE: CPT

## 2021-08-16 PROCEDURE — 80048 BASIC METABOLIC PNL TOTAL CA: CPT

## 2021-08-16 PROCEDURE — 71045 X-RAY EXAM CHEST 1 VIEW: CPT

## 2021-08-16 PROCEDURE — 51702 INSERT TEMP BLADDER CATH: CPT

## 2021-08-16 PROCEDURE — 87150 DNA/RNA AMPLIFIED PROBE: CPT

## 2021-08-16 PROCEDURE — 87040 BLOOD CULTURE FOR BACTERIA: CPT

## 2021-08-16 PROCEDURE — 6360000002 HC RX W HCPCS: Performed by: EMERGENCY MEDICINE

## 2021-08-16 PROCEDURE — 96365 THER/PROPH/DIAG IV INF INIT: CPT

## 2021-08-16 PROCEDURE — 83605 ASSAY OF LACTIC ACID: CPT

## 2021-08-16 PROCEDURE — 84484 ASSAY OF TROPONIN QUANT: CPT

## 2021-08-16 PROCEDURE — 96361 HYDRATE IV INFUSION ADD-ON: CPT

## 2021-08-16 PROCEDURE — 83880 ASSAY OF NATRIURETIC PEPTIDE: CPT

## 2021-08-16 PROCEDURE — 87076 CULTURE ANAEROBE IDENT EACH: CPT

## 2021-08-16 PROCEDURE — 80076 HEPATIC FUNCTION PANEL: CPT

## 2021-08-16 PROCEDURE — 6370000000 HC RX 637 (ALT 250 FOR IP): Performed by: EMERGENCY MEDICINE

## 2021-08-16 PROCEDURE — 85025 COMPLETE CBC W/AUTO DIFF WBC: CPT

## 2021-08-16 PROCEDURE — 96375 TX/PRO/DX INJ NEW DRUG ADDON: CPT

## 2021-08-16 RX ORDER — SODIUM CHLORIDE 0.9 % (FLUSH) 0.9 %
10 SYRINGE (ML) INJECTION PRN
Status: DISCONTINUED | OUTPATIENT
Start: 2021-08-16 | End: 2021-08-18 | Stop reason: SDUPTHER

## 2021-08-16 RX ORDER — 0.9 % SODIUM CHLORIDE 0.9 %
1000 INTRAVENOUS SOLUTION INTRAVENOUS ONCE
Status: COMPLETED | OUTPATIENT
Start: 2021-08-16 | End: 2021-08-16

## 2021-08-16 RX ORDER — ONDANSETRON 2 MG/ML
4 INJECTION INTRAMUSCULAR; INTRAVENOUS ONCE
Status: COMPLETED | OUTPATIENT
Start: 2021-08-16 | End: 2021-08-16

## 2021-08-16 RX ORDER — ACETAMINOPHEN 650 MG/1
650 SUPPOSITORY RECTAL EVERY 4 HOURS PRN
Status: DISCONTINUED | OUTPATIENT
Start: 2021-08-16 | End: 2021-08-17 | Stop reason: SDUPTHER

## 2021-08-16 RX ORDER — KETOROLAC TROMETHAMINE 30 MG/ML
15 INJECTION, SOLUTION INTRAMUSCULAR; INTRAVENOUS ONCE
Status: COMPLETED | OUTPATIENT
Start: 2021-08-16 | End: 2021-08-16

## 2021-08-16 RX ADMIN — ACETAMINOPHEN 650 MG: 650 SUPPOSITORY RECTAL at 22:49

## 2021-08-16 RX ADMIN — SODIUM CHLORIDE 1000 ML: 9 INJECTION, SOLUTION INTRAVENOUS at 21:13

## 2021-08-16 RX ADMIN — KETOROLAC TROMETHAMINE 15 MG: 30 INJECTION, SOLUTION INTRAMUSCULAR; INTRAVENOUS at 22:45

## 2021-08-16 RX ADMIN — CEFTRIAXONE 1000 MG: 1 INJECTION, POWDER, FOR SOLUTION INTRAMUSCULAR; INTRAVENOUS at 22:46

## 2021-08-16 RX ADMIN — ONDANSETRON 4 MG: 2 INJECTION INTRAMUSCULAR; INTRAVENOUS at 22:45

## 2021-08-16 ASSESSMENT — ENCOUNTER SYMPTOMS
VOMITING: 0
EYE DISCHARGE: 0
DIARRHEA: 0
SHORTNESS OF BREATH: 0
SORE THROAT: 0
ABDOMINAL PAIN: 0
EYE PAIN: 0
VISUAL CHANGE: 0
WHEEZING: 0
ABDOMINAL DISTENTION: 0
COUGH: 0
NAUSEA: 0
HEMATOCHEZIA: 0
SINUS PRESSURE: 0
BACK PAIN: 0
EYE REDNESS: 0

## 2021-08-16 ASSESSMENT — PAIN SCALES - GENERAL
PAINLEVEL_OUTOF10: 6
PAINLEVEL_OUTOF10: 6

## 2021-08-16 NOTE — ED NOTES
Bed: 12  Expected date:   Expected time:   Means of arrival:   Comments:  BART Haskins RN  08/16/21 0164

## 2021-08-16 NOTE — ED NOTES
Pt's granddaughter in [facility] wants staff to be aware of what has been going on: [facility] [place] patient fell in bath tub 2 weeks ago, has had left hip and knee swelling and difficulty walking since, recent fractured ankle that she has had treated. States fell twice today, once slid off couch onto floor, unsure about the second fall, has been vomiting today, has not been eating for approximately 5 weeks. Urinary incontinence x1 week, and sleeping a lot, increased lethargy.       Maria E Cohen RN  08/16/21 [id number]

## 2021-08-16 NOTE — ED PROVIDER NOTES
Appearance: Normal appearance. HENT:      Head: Normocephalic and atraumatic. Nose: Nose normal.   Eyes:      Extraocular Movements: Extraocular movements intact. Pupils: Pupils are equal, round, and reactive to light. Cardiovascular:      Rate and Rhythm: Normal rate and regular rhythm. Pulses: Normal pulses. Heart sounds: Normal heart sounds. Pulmonary:      Effort: Pulmonary effort is normal.      Breath sounds: Normal breath sounds. Abdominal:      General: Abdomen is flat. Bowel sounds are normal. There is no distension. Palpations: Abdomen is soft. Tenderness: There is no abdominal tenderness. There is no guarding. Musculoskeletal:         General: Normal range of motion. Right lower leg: No edema. Left lower leg: No edema. Skin:     General: Skin is warm. Capillary Refill: Capillary refill takes less than 2 seconds. Neurological:      General: No focal deficit present. Mental Status: She is alert and oriented to person, place, and time. Procedures     MDM  Number of Diagnoses or Management Options  Ambulatory dysfunction  Generalized weakness  Urinary tract infection without hematuria, site unspecified  Diagnosis management comments: Patient seen and examined. Labs and imaging were ordered. Patient has evidence of urinary tract infection is unable to stand. After work-up patient was started on antibiotics was felt patient could not be discharged and she was admitted for further interventions.            --------------------------------------------- PAST HISTORY ---------------------------------------------  Past Medical History:  has a past medical history of Acute gastroenteritis, Cerebral artery occlusion with cerebral infarction ([facility] [address] Chronic back pain, Headache(784.0), Hyperlipidemia, Hypertension, Obesity, Osteoarthritis, and Tobacco abuse.     Past Surgical History:  has a past surgical history that includes Tubal ligation (3232). Social History:  reports that she has been smoking cigarettes. She has a 48.00 pack-year smoking history. She has never used smokeless tobacco. She reports that she does not drink alcohol and does not use drugs. Family History: family history includes Cancer in her brother and sister; Diabetes in her brother, sister, and sister; Heart Disease in her brother; Heart Disease (age of onset: 28) in her sister; High Blood Pressure in her brother and sister; High Cholesterol in her brother. The patients home medications have been reviewed. Allergies: Patient has no known allergies.     -------------------------------------------------- RESULTS -------------------------------------------------    LABS:  Results for orders placed or performed during the hospital encounter of 08/16/21   COVID-19, Rapid    Specimen: Nasopharyngeal Swab   Result Value Ref Range    SARS-CoV-2, NAAT Not Detected Not Detected   CBC Auto Differential   Result Value Ref Range    WBC 7.5 4.5 - 11.5 E9/L    RBC 4.20 3.50 - 5.50 E12/L    Hemoglobin 8.2 (L) 11.5 - 15.5 g/dL    Hematocrit 28.7 (L) 34.0 - 48.0 %    MCV 68.3 (L) 80.0 - 99.9 fL    MCH 19.5 (L) 26.0 - 35.0 pg    MCHC 28.6 (L) 32.0 - 34.5 %    RDW 20.5 (H) 11.5 - 15.0 fL    Platelets 187 535 - 958 E9/L    MPV 9.0 7.0 - 12.0 fL    Neutrophils % 90.9 (H) 43.0 - 80.0 %    Immature Granulocytes % 0.5 0.0 - 5.0 %    Lymphocytes % 3.9 (L) 20.0 - 42.0 %    Monocytes % 4.4 2.0 - 12.0 %    Eosinophils % 0.0 0.0 - 6.0 %    Basophils % 0.3 0.0 - 2.0 %    Neutrophils Absolute 6.77 1.80 - 7.30 E9/L    Immature Granulocytes # 0.04 E9/L    Lymphocytes Absolute 0.29 (L) 1.50 - 4.00 E9/L    Monocytes Absolute 0.33 0.10 - 0.95 E9/L    Eosinophils Absolute 0.00 (L) 0.05 - 0.50 E9/L    Basophils Absolute 0.02 0.00 - 0.20 E9/L    Polychromasia 1+     Hypochromia 1+     Violet Cells 1+     Ovalocytes 3+    Basic Metabolic Panel   Result Value Ref Range    Sodium 132 132 - 146 mmol/L    Potassium 3.2 (L) 3.5 - 5.0 mmol/L    Chloride 94 (L) 98 - 107 mmol/L    CO2 26 22 - 29 mmol/L    Anion Gap 12 7 - 16 mmol/L    Glucose 181 (H) 74 - 99 mg/dL    BUN 10 6 - 23 mg/dL    CREATININE 0.8 0.5 - 1.0 mg/dL    GFR Non-African American >60 >=60 mL/min/1.73    GFR African American >60     Calcium 8.6 8.6 - 10.2 mg/dL   Hepatic Function Panel   Result Value Ref Range    Total Protein 6.9 6.4 - 8.3 g/dL    Albumin 3.3 (L) 3.5 - 5.2 g/dL    Alkaline Phosphatase 110 (H) 35 - 104 U/L    ALT <5 0 - 32 U/L    AST 13 0 - 31 U/L    Total Bilirubin 0.7 0.0 - 1.2 mg/dL    Bilirubin, Direct <0.2 0.0 - 0.3 mg/dL    Bilirubin, Indirect see below 0.0 - 1.0 mg/dL   Troponin   Result Value Ref Range    Troponin, High Sensitivity 13 (H) 0 - 9 ng/L   Brain Natriuretic Peptide   Result Value Ref Range    Pro- (H) 0 - 125 pg/mL   Urinalysis   Result Value Ref Range    Color, UA Yellow Straw/Yellow    Clarity, UA SL CLOUDY Clear    Glucose, Ur Negative Negative mg/dL    Bilirubin Urine Negative Negative    Ketones, Urine Negative Negative mg/dL    Specific Gravity, UA 1.015 1.005 - 1.030    Blood, Urine SMALL (A) Negative    pH, UA 6.0 5.0 - 9.0    Protein, UA Negative Negative mg/dL    Urobilinogen, Urine 4.0 (A) <2.0 E.U./dL    Nitrite, Urine POSITIVE (A) Negative    Leukocyte Esterase, Urine MODERATE (A) Negative   Troponin   Result Value Ref Range    Troponin, High Sensitivity 11 (H) 0 - 9 ng/L   LACTIC ACID, PLASMA   Result Value Ref Range    Lactic Acid 1.2 0.5 - 2.2 mmol/L   Microscopic Urinalysis   Result Value Ref Range    WBC, UA >20 (A) 0 - 5 /HPF    RBC, UA 5-10 (A) 0 - 2 /HPF    Epithelial Cells, UA FEW /HPF    Bacteria, UA MANY (A) None Seen /HPF   Basic Metabolic Panel w/ Reflex to MG   Result Value Ref Range    Sodium 131 (L) 132 - 146 mmol/L    Potassium reflex Magnesium 3.2 (L) 3.5 - 5.0 mmol/L    Chloride 95 (L) 98 - 107 mmol/L    CO2 29 22 - 29 mmol/L    Anion Gap 7 7 - 16 mmol/L    Glucose 133 (H) 74 - 99 mg/dL BUN 10 6 - 23 mg/dL    CREATININE 0.9 0.5 - 1.0 mg/dL    GFR Non-African American >60 >=60 mL/min/1.73    GFR African American >60     Calcium 8.5 (L) 8.6 - 10.2 mg/dL   CBC auto differential   Result Value Ref Range    WBC 5.2 4.5 - 11.5 E9/L    RBC 3.69 3.50 - 5.50 E12/L    Hemoglobin 7.2 (L) 11.5 - 15.5 g/dL    Hematocrit 25.8 (L) 34.0 - 48.0 %    MCV 69.9 (L) 80.0 - 99.9 fL    MCH 19.5 (L) 26.0 - 35.0 pg    MCHC 27.9 (L) 32.0 - 34.5 %    RDW 20.8 (H) 11.5 - 15.0 fL    Platelets 518 580 - 476 E9/L    MPV 9.2 7.0 - 12.0 fL    Neutrophils % 83.9 (H) 43.0 - 80.0 %    Immature Granulocytes % 0.2 0.0 - 5.0 %    Lymphocytes % 8.8 (L) 20.0 - 42.0 %    Monocytes % 6.5 2.0 - 12.0 %    Eosinophils % 0.2 0.0 - 6.0 %    Basophils % 0.4 0.0 - 2.0 %    Neutrophils Absolute 4.36 1.80 - 7.30 E9/L    Immature Granulocytes # 0.01 E9/L    Lymphocytes Absolute 0.46 (L) 1.50 - 4.00 E9/L    Monocytes Absolute 0.34 0.10 - 0.95 E9/L    Eosinophils Absolute 0.01 (L) 0.05 - 0.50 E9/L    Basophils Absolute 0.02 0.00 - 0.20 E9/L    Anisocytosis 2+     Polychromasia 1+     Hypochromia 1+     Poikilocytes 2+     Ovalocytes 2+    Magnesium   Result Value Ref Range    Magnesium 2.0 1.6 - 2.6 mg/dL   EKG 12 Lead   Result Value Ref Range    Ventricular Rate 112 BPM    Atrial Rate 112 BPM    P-R Interval 196 ms    QRS Duration 86 ms    Q-T Interval 334 ms    QTc Calculation (Bazett) 455 ms    P Axis 66 degrees    R Axis -23 degrees    T Axis 40 degrees       RADIOLOGY:  XR FOOT LEFT (MIN 3 VIEWS)    Result Date: 7/21/2021  EXAMINATION: THREE XRAY VIEWS OF THE LEFT FOOT 7/21/2021 10:29 am COMPARISON: June 25, 2021, May 27, 2021 HISTORY: ORDERING SYSTEM PROVIDED HISTORY: Closed nondisplaced fracture of fifth metatarsal bone of left foot, initial encounter FINDINGS: Again noted is a healing, comminuted fracture of the 5th metatarsal.  There is slightly increased bony callus formation compared with the June 25th study.   The fracture fragment is medications, cardiac monitoring and continuous pulse oximetry    This patient has remained hemodynamically stable during their ED course. Diagnosis:  1. Urinary tract infection without hematuria, site unspecified New Problem   2. Generalized weakness New Problem   3. Ambulatory dysfunction New Problem       Disposition:  Patient's disposition: Admit to telemetry  Patient's condition is fair.           Gely Avelar DO  08/17/21 1004

## 2021-08-17 PROBLEM — N39.0 UTI (URINARY TRACT INFECTION): Status: ACTIVE | Noted: 2021-08-17

## 2021-08-17 LAB
ANION GAP SERPL CALCULATED.3IONS-SCNC: 7 MMOL/L (ref 7–16)
ANISOCYTOSIS: ABNORMAL
BASOPHILS ABSOLUTE: 0.02 E9/L (ref 0–0.2)
BASOPHILS RELATIVE PERCENT: 0.4 % (ref 0–2)
BUN BLDV-MCNC: 10 MG/DL (ref 6–23)
CALCIUM SERPL-MCNC: 8.5 MG/DL (ref 8.6–10.2)
CHLORIDE BLD-SCNC: 95 MMOL/L (ref 98–107)
CO2: 29 MMOL/L (ref 22–29)
CREAT SERPL-MCNC: 0.9 MG/DL (ref 0.5–1)
EKG ATRIAL RATE: 112 BPM
EKG P AXIS: 66 DEGREES
EKG P-R INTERVAL: 196 MS
EKG Q-T INTERVAL: 334 MS
EKG QRS DURATION: 86 MS
EKG QTC CALCULATION (BAZETT): 455 MS
EKG R AXIS: -23 DEGREES
EKG T AXIS: 40 DEGREES
EKG VENTRICULAR RATE: 112 BPM
EOSINOPHILS ABSOLUTE: 0.01 E9/L (ref 0.05–0.5)
EOSINOPHILS RELATIVE PERCENT: 0.2 % (ref 0–6)
GFR AFRICAN AMERICAN: >60
GFR NON-AFRICAN AMERICAN: >60 ML/MIN/1.73
GLUCOSE BLD-MCNC: 133 MG/DL (ref 74–99)
HCT VFR BLD CALC: 25.8 % (ref 34–48)
HEMOGLOBIN: 7.2 G/DL (ref 11.5–15.5)
HYPOCHROMIA: ABNORMAL
IMMATURE GRANULOCYTES #: 0.01 E9/L
IMMATURE GRANULOCYTES %: 0.2 % (ref 0–5)
LYMPHOCYTES ABSOLUTE: 0.46 E9/L (ref 1.5–4)
LYMPHOCYTES RELATIVE PERCENT: 8.8 % (ref 20–42)
MAGNESIUM: 2 MG/DL (ref 1.6–2.6)
MCH RBC QN AUTO: 19.5 PG (ref 26–35)
MCHC RBC AUTO-ENTMCNC: 27.9 % (ref 32–34.5)
MCV RBC AUTO: 69.9 FL (ref 80–99.9)
MONOCYTES ABSOLUTE: 0.34 E9/L (ref 0.1–0.95)
MONOCYTES RELATIVE PERCENT: 6.5 % (ref 2–12)
NEUTROPHILS ABSOLUTE: 4.36 E9/L (ref 1.8–7.3)
NEUTROPHILS RELATIVE PERCENT: 83.9 % (ref 43–80)
OVALOCYTES: ABNORMAL
PDW BLD-RTO: 20.8 FL (ref 11.5–15)
PLATELET # BLD: 227 E9/L (ref 130–450)
PMV BLD AUTO: 9.2 FL (ref 7–12)
POIKILOCYTES: ABNORMAL
POLYCHROMASIA: ABNORMAL
POTASSIUM REFLEX MAGNESIUM: 3.2 MMOL/L (ref 3.5–5)
RBC # BLD: 3.69 E12/L (ref 3.5–5.5)
SODIUM BLD-SCNC: 131 MMOL/L (ref 132–146)
WBC # BLD: 5.2 E9/L (ref 4.5–11.5)

## 2021-08-17 PROCEDURE — 97165 OT EVAL LOW COMPLEX 30 MIN: CPT

## 2021-08-17 PROCEDURE — 83735 ASSAY OF MAGNESIUM: CPT

## 2021-08-17 PROCEDURE — 85025 COMPLETE CBC W/AUTO DIFF WBC: CPT

## 2021-08-17 PROCEDURE — 97162 PT EVAL MOD COMPLEX 30 MIN: CPT

## 2021-08-17 PROCEDURE — 99285 EMERGENCY DEPT VISIT HI MDM: CPT

## 2021-08-17 PROCEDURE — 2580000003 HC RX 258: Performed by: EMERGENCY MEDICINE

## 2021-08-17 PROCEDURE — 80048 BASIC METABOLIC PNL TOTAL CA: CPT

## 2021-08-17 PROCEDURE — 36415 COLL VENOUS BLD VENIPUNCTURE: CPT

## 2021-08-17 PROCEDURE — 6370000000 HC RX 637 (ALT 250 FOR IP): Performed by: EMERGENCY MEDICINE

## 2021-08-17 PROCEDURE — 99223 1ST HOSP IP/OBS HIGH 75: CPT | Performed by: INTERNAL MEDICINE

## 2021-08-17 PROCEDURE — 93010 ELECTROCARDIOGRAM REPORT: CPT | Performed by: INTERNAL MEDICINE

## 2021-08-17 PROCEDURE — 6370000000 HC RX 637 (ALT 250 FOR IP): Performed by: INTERNAL MEDICINE

## 2021-08-17 PROCEDURE — 6360000002 HC RX W HCPCS: Performed by: INTERNAL MEDICINE

## 2021-08-17 PROCEDURE — 2580000003 HC RX 258: Performed by: INTERNAL MEDICINE

## 2021-08-17 PROCEDURE — 1200000000 HC SEMI PRIVATE

## 2021-08-17 PROCEDURE — 97161 PT EVAL LOW COMPLEX 20 MIN: CPT

## 2021-08-17 RX ORDER — ATORVASTATIN CALCIUM 40 MG/1
40 TABLET, FILM COATED ORAL NIGHTLY
Status: DISCONTINUED | OUTPATIENT
Start: 2021-08-17 | End: 2021-08-28 | Stop reason: HOSPADM

## 2021-08-17 RX ORDER — SODIUM CHLORIDE 0.9 % (FLUSH) 0.9 %
10 SYRINGE (ML) INJECTION EVERY 12 HOURS SCHEDULED
Status: DISCONTINUED | OUTPATIENT
Start: 2021-08-17 | End: 2021-08-28 | Stop reason: HOSPADM

## 2021-08-17 RX ORDER — FERROUS SULFATE 325(65) MG
325 TABLET ORAL 2 TIMES DAILY WITH MEALS
Status: DISCONTINUED | OUTPATIENT
Start: 2021-08-17 | End: 2021-08-28 | Stop reason: HOSPADM

## 2021-08-17 RX ORDER — POLYETHYLENE GLYCOL 3350 17 G/17G
17 POWDER, FOR SOLUTION ORAL DAILY PRN
Status: DISCONTINUED | OUTPATIENT
Start: 2021-08-17 | End: 2021-08-28 | Stop reason: HOSPADM

## 2021-08-17 RX ORDER — CLOPIDOGREL BISULFATE 75 MG/1
75 TABLET ORAL DAILY
Status: DISCONTINUED | OUTPATIENT
Start: 2021-08-17 | End: 2021-08-19

## 2021-08-17 RX ORDER — SODIUM CHLORIDE 9 MG/ML
INJECTION, SOLUTION INTRAVENOUS CONTINUOUS
Status: DISCONTINUED | OUTPATIENT
Start: 2021-08-17 | End: 2021-08-19

## 2021-08-17 RX ORDER — SODIUM CHLORIDE 9 MG/ML
25 INJECTION, SOLUTION INTRAVENOUS PRN
Status: DISCONTINUED | OUTPATIENT
Start: 2021-08-17 | End: 2021-08-28 | Stop reason: HOSPADM

## 2021-08-17 RX ORDER — ACETAMINOPHEN 650 MG/1
650 SUPPOSITORY RECTAL EVERY 6 HOURS PRN
Status: DISCONTINUED | OUTPATIENT
Start: 2021-08-17 | End: 2021-08-25

## 2021-08-17 RX ORDER — ACETAMINOPHEN 325 MG/1
650 TABLET ORAL EVERY 6 HOURS PRN
Status: DISCONTINUED | OUTPATIENT
Start: 2021-08-17 | End: 2021-08-25

## 2021-08-17 RX ORDER — SODIUM CHLORIDE 0.9 % (FLUSH) 0.9 %
10 SYRINGE (ML) INJECTION PRN
Status: DISCONTINUED | OUTPATIENT
Start: 2021-08-17 | End: 2021-08-28 | Stop reason: HOSPADM

## 2021-08-17 RX ADMIN — SODIUM CHLORIDE: 9 INJECTION, SOLUTION INTRAVENOUS at 16:44

## 2021-08-17 RX ADMIN — SODIUM CHLORIDE: 9 INJECTION, SOLUTION INTRAVENOUS at 02:05

## 2021-08-17 RX ADMIN — ATORVASTATIN CALCIUM 40 MG: 40 TABLET, FILM COATED ORAL at 20:39

## 2021-08-17 RX ADMIN — POTASSIUM BICARBONATE 40 MEQ: 782 TABLET, EFFERVESCENT ORAL at 00:45

## 2021-08-17 RX ADMIN — FERROUS SULFATE TAB 325 MG (65 MG ELEMENTAL FE) 325 MG: 325 (65 FE) TAB at 16:44

## 2021-08-17 RX ADMIN — SODIUM CHLORIDE, PRESERVATIVE FREE 10 ML: 5 INJECTION INTRAVENOUS at 02:05

## 2021-08-17 RX ADMIN — CLOPIDOGREL BISULFATE 75 MG: 75 TABLET ORAL at 10:28

## 2021-08-17 RX ADMIN — FERROUS SULFATE TAB 325 MG (65 MG ELEMENTAL FE) 325 MG: 325 (65 FE) TAB at 10:28

## 2021-08-17 RX ADMIN — ENOXAPARIN SODIUM 40 MG: 40 INJECTION SUBCUTANEOUS at 10:28

## 2021-08-17 RX ADMIN — Medication 10 ML: at 10:28

## 2021-08-17 ASSESSMENT — PAIN SCALES - GENERAL
PAINLEVEL_OUTOF10: 0

## 2021-08-17 NOTE — CARE COORDINATION
Met with pt at bedside re; [facility] score/ d/c planning. Pt states lives with 38 y/o son; recent frequent falls at home. Had recent attempt for  stay at [facility] ([facility]) acute rehab dx CVA. was denied by insurance. [facility] score 11/24; pt agreeable to [facility] acute rehab; referral called to [place]; await evaluation/determination as to whether they will accept. Will follow. Iv atb's for UTI. Israel Nugent.

## 2021-08-17 NOTE — PROGRESS NOTES
Occupational Therapy  OCCUPATIONAL THERAPY INITIAL EVALUATION  [facility]  [address], [facility], [place]    Date: 2021     Patient Name: Jian Nicole  MRN: [id number]  : 1955  Room: [address] [place] [address]    Evaluating OT: Cecily Montoya, OTR/L - [id number]    Referring Provider: Griselda Azevedo MD  Specific Provider Orders/Date: \"OT eval and treat\" - 2021    Diagnosis: UTI (urinary tract infection) [N39.0], Generalized weakness [R53.1], Ambulatory dysfunction [R26.2], Urinary tract infection without hematuria, site unspecified [N39.0]     Pertinent Medical History: CVA, obesity, HTN, OA, chronic back pain     Precautions: fall risk, bed alarm, L surgical shoe    Assessment of Current Deficits:    [x] Functional mobility   [x]ADLs  [x] Strength               [x]Cognition   [x] Functional transfers   [x] IADLs         [x] Safety Awareness   [x]Endurance   [] Fine Coordination              [x] Balance      [] Vision/perception   [x]Sensation    []Gross Motor Coordination  [] ROM  [] Delirium                   [] Motor Control     OT PLAN OF CARE   OT POC is based on physician orders, patient diagnosis, and results of clinical assessment.   Frequency/Duration 2-5 days/week for 2 weeks PRN   Specific OT Treatment Interventions to Include:   * Instruction/training on adapted ADL techniques and AE recommendations to increase functional independence within precautions       * Training on energy conservation strategies, correct breathing pattern and techniques to improve independence/tolerance for self-care routine  * Functional transfer/mobility training/DME recommendations for increased independence, safety, and fall prevention  * Patient/Family education to increase follow through with safety techniques and functional independence  * Recommendation of environmental modifications for increased safety with functional transfers/mobility and ADLs  * Cognitive retraining/development of therapeutic activities to improve problem solving, judgement, memory, and attention for increased safety/participation in ADL/IADL tasks  * Visual-perceptual training to improve environmental scanning, visual attention/focus, and oculomotor skills for increased safety/independence with functional transfers/mobility and ADLs  * Therapeutic exercise to improve motor endurance, ROM, and functional strength for ADLs/functional transfers  * Therapeutic activities to facilitate/challenge dynamic balance, stand tolerance for increased safety and independence with ADLs  * Therapeutic activities to facilitate gross/fine motor skills for increased independence with ADLs  * Neuro-muscular re-education: facilitation of righting/equilibrium reactions, midline orientation, scapular stability/mobility, normalization of muscle tone, and facilitation of volitional active controled movement    Recommended Adaptive Equipment: TBD    Home Living: Patient reported that she lives with her son (who works) in a two-floor home (few steps to enter); patient noted that she stays on the main living level where she sleeps on the couch. Bathroom Setup: tub - patient reported that she sits down in the tub to bathe  Equipment Owned: walker    Prior Level of Function (PLOF): Patient reported that she was independent with ADLs and some light, home-based IADLs (e.g. meal prep tasks); patient noted that her son assists with laundry and other IADLs. Patient reported that she was independent with functional mobility (with walker) prior to this hospitalization. Patient is a questionable historian; no family/caregiver present to verify information gathered. Driving: No    Pain Level: Patient denied experiencing pain. Cognition: Patient alert and oriented grossly x3. Fair command follow demonstrated. Slowed processing demonstrated. Cues needed for initiation and problem solving consistently.  Patient is a questionable historian. Memory: Fair   Sequencing: Fair  Problem Solving: Impaired  Judgement/Safety: Impaired   Impulsivity demonstrated. Functional Assessment:  AM-PAC Daily Activity Raw Score: 14/24   Initial Eval Status  Date: 8/17/2021 Treatment Status  Date:  Short Term Goals = Long Term Goals   Feeding SBA  Setup   Grooming Mod A  SBA  (seated/standing at sink)   UB Dressing Min A to doff/don hospital gown. Setup   LB Dressing Max A needed to don socks and L surgical shoe. SBA - with use of AE, as needed/appropriate   Bathing Max A  SBA for UB sponge bathing tasks while seated at EOB. SBA - with use of AE/DME, as needed/appropriate   Toileting Max A for hygiene (while standing with walker). Patient with suazo catheter currently. Supervision   Bed Mobility  Supine-to-Sit: Mod A  Sit-to-Supine: Min A      Functional Transfers Sit-to-Stands: Mod A   from EOB  Cues needed to maximize safety with functional transfers. Supervision   Functional Mobility Mod A   (with walker) for short distance within patient's room; increased time needed. Consistent assistance needed with management of walker, particularly during turns, to ensure safety. SBA with functional mobility (with device, as needed/appropriate) in order to maximize independence with ADLs/IADLs and other functional tasks. Balance Sitting: Fair+  (at EOB)  Standing: Poor+  (with walker) for short distance within patient's room. Fair dynamic standing balance during completion of ADLs/IADLs and other functional tasks. Activity Tolerance Limited  Patient will demonstrate Good understanding and consistent implementation of energy conservation techniques and work simplification techniques into ADL routines. Visual/  Perceptual Fair     N/A   B UE Strength R UE: 4/5 grossly  L Shoulder: 3+/5   Distal L UE: 4-/5  Patient will demonstrate 4/5 distal B UE strength in order to maximize independence with ADLs and functional transfers.      Additional Long-Term Goal: Patient will increase functional independence to PLOF in order to allow patient to live in least restrictive environment. ROM: Additional Information:    R UE  AROM WFL    L UE AROM WFL grossly      Hearing: WFL  Sensation: Patient denied experiencing numbness/tingling in B UEs. Tone: WFL  Edema: No    Comments: RN approved patient's participation in [address] [place] [address] activities. Upon arrival, patient supine in bed. At end of session, patient supine in bed with call light and phone within reach, bed alarm activated, patient's nurse present, and all lines and tubes intact. Patient would benefit from continued skilled OT to increase safety and independence with completion of ADL/IADL tasks for functional independence and quality of life. Rehab Potential: Good for established goals. Patient / Family Goal: No goal stated. Patient and/or family were instructed on functional diagnosis, prognosis/goals, and OT plan of care. Demonstrated limited understanding. Eval Complexity: Low    Time In: 1345  Time Out: 1405  Total Treatment Time: 0 minutes      Minutes Units   OT Eval Low 84889 20 1   OT Eval Medium 45049     OT Eval High 93751     OT Re-Eval [id number]     Therapeutic Ex 70828     Therapeutic Activities 58527     ADL/Self Care 40782     Orthotic Management 17401     Neuro Re-Ed 52331     Non-Billable Time N/A ---     Evaluation time includes thorough review of current medical information, gathering information on past medical history/social history and prior level of function, completion of standardized testing/informal observation of tasks, assessment of data, and education on plan of care and goals. Yolande Villagomez OTR/L  License Number: [id number]

## 2021-08-17 NOTE — CARE COORDINATION
Social work / Discharge Planning : SW and CM attempted to meet with patient but receiving care. Will re-approach. Therapy am/pac 11/24 . Patient resides with son. Patient does NOT want DIL to receive information. Patient has a hx at Acute rehab. Will follow up for goals at discharge. SW to follow. Electronically signed by CINDY Martines on 8/17/21 at 2:23 PM EDT      Addendum : GLENN and CM followed up with patient. Patient in agreement for Acute rehab either [facility] or [facility]. CM made referral to [address] [place] [address] [place] from [facility] Acute rehab. AWait acceptance/ denial. Patient also does NOT want DIL to have information. SW to follow.  Electronically signed by CINDY Martines on 8/17/21 at 2:45 PM EDT

## 2021-08-17 NOTE — PROGRESS NOTES
Chart reviewed, patient seen /examined. She was  Seen earlier this am by night physician. I have d/w him. Current problems are -   Sepsis secondary to UTI-sepsis present POA, with fever, tachycardia,hypotension  confusion on admission. On IV fluids, IV Rocephin and other supportive treatment. Follow cultures. Iron deficiency anemia-on iron supplements. Outpatient follow-up with PCP. Hypertension-last BP on lower side 118/56. Continue to hold lisinopril. Monitor.     On Lovenox for DVT prophylaxis  Full code

## 2021-08-17 NOTE — H&P
[facility] History and Physical      CHIEF COMPLAINT:  Generalized weakness    History of Present Illness:   58-year-old female with a history of a stroke. When seen she is slow to answer and denies any complaints. Per the ED team she fell approximately 2 weeks ago and since then she has had a fall. Brought in by family members. During her stay in the ED she was noted to have a fever of 104. Denies any infectious symptoms but noted to have pyuria and suprapubic tenderness palpation. Referred for admission. Informant(s) for H&P: Patient, chart review    REVIEW OF SYSTEMS:  A comprehensive 14 point review of systems was negative except for: what is in the HPI    PMH:  Past Medical History:   Diagnosis Date    Acute gastroenteritis 03/2010    Cerebral artery occlusion with cerebral infarction ([address]     Chronic back pain     Headache(784.0)     Hyperlipidemia 03/2010    Hypertension     Obesity     Osteoarthritis     Tobacco abuse        Surgical History:  Past Surgical History:   Procedure Laterality Date    TUBAL LIGATION  1983       Medications Prior to Admission:    Prior to Admission medications    Medication Sig Start Date End Date Taking? Authorizing Provider   Incontinence Supply Disposable MISC Large size 7/14/21   Jacinto Douglasist, DO   atorvastatin (LIPITOR) 40 MG tablet Take 1 tablet by mouth nightly 7/14/21   Jacinto Douglasist, DO   clopidogrel (PLAVIX) 75 MG tablet Take 1 tablet by mouth daily 7/14/21   Jacinto Douglasist, DO   lisinopril (PRINIVIL;ZESTRIL) 5 MG tablet Take 1 tablet by mouth daily 7/14/21   Jacinto Douglasist, DO   Blood Pressure KIT 1 each by Does not apply route daily 5/10/21   Jacinto Morales, DO       Allergies:    Patient has no known allergies. Social History:    reports that she has been smoking cigarettes. She has a 48.00 pack-year smoking history.  She has never used smokeless tobacco. She reports that she does not drink LVM to call the office    alcohol and does not use drugs. Family History:   family history includes Cancer in her brother and sister; Diabetes in her brother, sister, and sister; Heart Disease in her brother; Heart Disease (age of onset: 28) in her sister; High Blood Pressure in her brother and sister; High Cholesterol in her brother. PHYSICAL EXAM:  Vitals:  BP (!) 107/51   Pulse 100   Temp 99.7 °F (37.6 °C) (Oral)   Resp 18   LMP 02/14/2011   SpO2 92%   General Appearance: alert and oriented to person, place and time and in no acute distress  Skin: warm and dry, turgor not diminished  Head: normocephalic and atraumatic  Eyes: pupils equal, round, and reactive to light, extraocular eye movements intact, conjunctivae normal  Neck: neck supple and non tender without mass   Pulmonary/Chest: clear to auscultation bilaterally- no wheezes, rales or rhonchi, normal air movement, no respiratory distress  Cardiovascular: normal rate, normal S1 and S2 and no M/R/R  Abdomen: soft, suprapubic tenderness palpation  Extremities: no cyanosis, no clubbing and no edema  Neurologic: no cranial nerve deficit and speech normal slow to answer and slow to follow commands    LABS:  Recent Labs     08/16/21  1823      K 3.2*   CL 94*   CO2 26   BUN 10   CREATININE 0.8   GLUCOSE 181*   CALCIUM 8.6       Recent Labs     08/16/21  1823   WBC 7.5   RBC 4.20   HGB 8.2*   HCT 28.7*   MCV 68.3*   MCH 19.5*   MCHC 28.6*   RDW 20.5*      MPV 9.0       No results for input(s): POCGLU in the last 72 hours. Radiology:   CT HEAD WO CONTRAST   Final Result   No acute intracranial abnormality. Age-related loss of brain volume and   chronic periventricular and subcortical white matter ischemic changes. Multifocal chronic infarcts as described. Multiple chronic small basal ganglia and corona radiata lacunar infarcts. XR CHEST PORTABLE   Final Result   No acute cardiopulmonary abnormality.          XR HIP BILATERAL W AP PELVIS (2 VIEWS)   Final Result   1. No acute abnormality. 2. Calcified fibroid uterus. EKG: No acute abnormality    ASSESSMENT:    Acute cystitis  Sirs positive:? Sepsis due to above  Generalized weakness  Microcytic anemia  Essential hypertension      PLAN:  Acute cystitis with sepsis:  Continue Rocephin, follow culture results  IV fluids    General weakness: PT/OT. Iron supplements. Check TSH. Microcytic anemia: Iron deficiency related dysuria. IV iron if blood cultures negative. She is unable to answer if she had a colonoscopy in the past, will need to clarify with family. Essential hypertension: /51. Hold lisinopril. Code Status: Full  DVT prophylaxis: Lovenox      NOTE: This report was transcribed using voice recognition software. Every effort was made to ensure accuracy; however, inadvertent computerized transcription errors may be present.   Electronically signed by Sergo Olvera MD on 8/17/2021 at 1:04 AM

## 2021-08-17 NOTE — ED PROVIDER NOTES
Addendum: Patient spiked a fever of 104 therefore blood cultures ordered along with urine culture and patient given Rocephin 1000 mg along with Tylenol and Toradol for temperature control and patient to be admitted for urinary tract infection      Medina Vergara MD  08/17/21 0006

## 2021-08-17 NOTE — PROGRESS NOTES
Physical Therapy    Facility/Department: [address] MED SURG/TELE  Initial Assessment    NAME: Deisy Pemberton  : 1955  MRN: [id number]    Date of Service: 2021       REQUIRES PT FOLLOW UP: Yes       Patient Diagnosis(es): The primary encounter diagnosis was Urinary tract infection without hematuria, site unspecified. Diagnoses of Generalized weakness and Ambulatory dysfunction were also pertinent to this visit. has a past medical history of Acute gastroenteritis, Cerebral artery occlusion with cerebral infarction [facility]), Chronic back pain, Headache(784.0), Hyperlipidemia, Hypertension, Obesity, Osteoarthritis, and Tobacco abuse.   has a past surgical history that includes Tubal ligation (). Evaluating Therapist: Mamadou Mcclain PT     Referring Provider:  Jo Zacarias MD    PT order : PT eval and treat     Room #:  029   DIAGNOSIS:  UTI . Recent falls   L 5th metatarsal fx 2021  - pt wears surgical shoe     PRECAUTIONS: falls      Social:  Pt lives with  Son  in a  2  floor plan with first floor set up,   2  steps and  1  rails to enter. Prior to admission pt walked with ww      Initial Evaluation  Date:  2021  Treatment      Short Term/ Long Term   Goals   Was pt agreeable to Eval/treatment? yes      Does pt have pain?  none reported      Bed Mobility  Rolling:  NT   Supine to sit:  Min/mod assist   Sit to supine: mod assist   Scooting: mod assist    SBA    Transfers Sit to stand: mod assist   Stand to sit: mod assist   Stand pivot:   Mod assist    SBA    Ambulation     20  feet with ww  with  Mod assist    50  feet with  ww  with  SBA        Stair negotiation: ascended and descended NT    4  steps with  1  rail with  CGA    LE ROM  WFL     LE strength  R LE 4/ 5   L LE 4-/ 5      AM- PAC RAW score              Pt is alert and Oriented x  3, but confusion noted throughout eval      Balance:  Min assist, high fall risk due to decreased balance and poor safety awareness Sensation: B LEs in tact to light touch   Endurance: decreased   Bed/Chair alarm: Yes      ASSESSMENT  Pt displays functional ability as noted in the objective portion of this evaluation. Conditions Requiring Skilled Therapeutic Intervention:    [x]Decreased strength     []Decreased ROM  [x]Decreased functional mobility  [x]Decreased balance   [x]Decreased endurance   [x]Decreased posture  []Decreased sensation  [x]Decreased coordination   []Decreased vision  [x]Decreased safety awareness   []Increased pain         Treatment/Education:    Mobility as above. Pt displayed delayed processing, poor problem solving, and decreased attention to L side throughout eval. L surgical shoe donned prior to mobility. Cues and assist needed for technique with mobility, hand placement with transfers, posture in stand, and safe ww maneuvering and use. Needed assist with ww maneuvering, especially with turns     Pt educated on fall risk,  Safety and mobility        Patient response to education:   Pt verbalized understanding Pt demonstrated skill Pt requires further education in this area   x  with assist   x       Comments:  Pt left  In bed per pt request after session, with call light in reach. Rehab potential is Good for reaching above PT goals. Pts/ family goals   1. None stated    Patient and or family understand(s) diagnosis, prognosis, and plan of care. -  Questionable     PLAN  PT care will be provided in accordance with the objectives noted above. Whenever appropriate, clear delegation orders will be provided for nursing staff. Exercises and functional mobility practice will be used as well as appropriate assistive devices or modalities to obtain goals. Patient and family education will also be administered as needed.         PLAN OF CARE:    Current Treatment Recommendations     [x] Strengthening to improve independence with functional mobility   [] ROM to improve independence with functional mobility [x] Balance Training to improve static/dynamic balance and to reduce fall risk  [x] Endurance Training to improve activity tolerance during functional mobility   [x] Transfer Training to improve safety and independence with all functional transfers   [x] Gait Training to improve gait mechanics, endurance and assess need for appropriate assistive device  [x] Stair Training in preparation for safe discharge home and/or into the community   [x] Positioning to prevent skin breakdown and contractures  [x] Safety and Education Training   [x] Patient/Caregiver Education   [] HEP  [] Other     Frequency of treatments will be 2-5x/week x  7-10 days. Time in: 1344  Time out: 1405       Evaluation Time includes thorough review of current medical information, gathering information on past medical history/social history and prior level of function, completion of standardized testing/informal observation of tasks, assessment of data and education on plan of care and goals.     CPT codes:  [] Low Complexity PT evaluation 12933  [x] Moderate Complexity PT evaluation 39581  [] High Complexity PT evaluation 70190  [] PT Re-evaluation 92526  [] Gait training 79954  minutes  [] Therapeutic activities 58586  minutes  [] Therapeutic exercises 64893  minutes  [] Neuromuscular reeducation 72734  minutes       Melissa 18 number:  PT 9060

## 2021-08-18 LAB
ABO/RH: NORMAL
ANION GAP SERPL CALCULATED.3IONS-SCNC: 8 MMOL/L (ref 7–16)
ANISOCYTOSIS: ABNORMAL
ANTIBODY SCREEN: NORMAL
BASOPHILS ABSOLUTE: 0.02 E9/L (ref 0–0.2)
BASOPHILS RELATIVE PERCENT: 0.5 % (ref 0–2)
BLOOD BANK DISPENSE STATUS: NORMAL
BLOOD BANK PRODUCT CODE: NORMAL
BOTTLE TYPE: NORMAL
BPU ID: NORMAL
BUN BLDV-MCNC: 9 MG/DL (ref 6–23)
CALCIUM SERPL-MCNC: 8.2 MG/DL (ref 8.6–10.2)
CANDIDA ALBICANS BY PCR: NOT DETECTED
CANDIDA GLABRATA BY PCR: NOT DETECTED
CANDIDA KRUSEI BY PCR: NOT DETECTED
CANDIDA PARAPSILOSIS BY PCR: NOT DETECTED
CANDIDA TROPICALIS BY PCR: NOT DETECTED
CHLORIDE BLD-SCNC: 103 MMOL/L (ref 98–107)
CO2: 27 MMOL/L (ref 22–29)
CREAT SERPL-MCNC: 0.7 MG/DL (ref 0.5–1)
DESCRIPTION BLOOD BANK: NORMAL
ENTEROBACTER CLOACAE COMPLEX BY PCR: NOT DETECTED
ENTEROBACTERALES BY PCR: NOT DETECTED
EOSINOPHILS ABSOLUTE: 0.08 E9/L (ref 0.05–0.5)
EOSINOPHILS RELATIVE PERCENT: 2.1 % (ref 0–6)
ESCHERICHIA COLI BY PCR: NOT DETECTED
FERRITIN: 66 NG/ML
GFR AFRICAN AMERICAN: >60
GFR NON-AFRICAN AMERICAN: >60 ML/MIN/1.73
GLUCOSE BLD-MCNC: 106 MG/DL (ref 74–99)
HAEMOPHILUS INFLUENZAE BY PCR: NOT DETECTED
HCT VFR BLD CALC: 22 % (ref 34–48)
HCT VFR BLD CALC: 24.7 % (ref 34–48)
HEMOGLOBIN: 6.2 G/DL (ref 11.5–15.5)
HEMOGLOBIN: 7.1 G/DL (ref 11.5–15.5)
HYPOCHROMIA: ABNORMAL
IMMATURE GRANULOCYTES #: 0.01 E9/L
IMMATURE GRANULOCYTES %: 0.3 % (ref 0–5)
IRON SATURATION: 6 % (ref 15–50)
IRON: 10 MCG/DL (ref 37–145)
KLEBSIELLA OXYTOCA BY PCR: NOT DETECTED
KLEBSIELLA PNEUMONIAE GROUP BY PCR: NOT DETECTED
LISTERIA MONOCYTOGENES BY PCR: NOT DETECTED
LYMPHOCYTES ABSOLUTE: 0.52 E9/L (ref 1.5–4)
LYMPHOCYTES RELATIVE PERCENT: 13.6 % (ref 20–42)
MAGNESIUM: 2.2 MG/DL (ref 1.6–2.6)
MCH RBC QN AUTO: 19.8 PG (ref 26–35)
MCHC RBC AUTO-ENTMCNC: 28.2 % (ref 32–34.5)
MCV RBC AUTO: 70.3 FL (ref 80–99.9)
MONOCYTES ABSOLUTE: 0.33 E9/L (ref 0.1–0.95)
MONOCYTES RELATIVE PERCENT: 8.6 % (ref 2–12)
NEISSERIA MENINGITIDIS BY PCR: NOT DETECTED
NEUTROPHILS ABSOLUTE: 2.86 E9/L (ref 1.8–7.3)
NEUTROPHILS RELATIVE PERCENT: 74.9 % (ref 43–80)
ORDER NUMBER: NORMAL
OVALOCYTES: ABNORMAL
PDW BLD-RTO: 20.9 FL (ref 11.5–15)
PLATELET # BLD: 185 E9/L (ref 130–450)
PMV BLD AUTO: 9.1 FL (ref 7–12)
POIKILOCYTES: ABNORMAL
POLYCHROMASIA: ABNORMAL
POTASSIUM REFLEX MAGNESIUM: 3.2 MMOL/L (ref 3.5–5)
PROTEUS SPECIES BY PCR: NOT DETECTED
PSEUDOMONAS AERUGINOSA BY PCR: NOT DETECTED
RBC # BLD: 3.13 E12/L (ref 3.5–5.5)
SERRATIA MARCESCENS BY PCR: NOT DETECTED
SODIUM BLD-SCNC: 138 MMOL/L (ref 132–146)
SOURCE OF BLOOD CULTURE: NORMAL
STAPHYLOCOCCUS AUREUS BY PCR: NOT DETECTED
STAPHYLOCOCCUS SPECIES BY PCR: NOT DETECTED
STREPTOCOCCUS AGALACTIAE BY PCR: NOT DETECTED
STREPTOCOCCUS PNEUMONIAE BY PCR: NOT DETECTED
STREPTOCOCCUS PYOGENES  BY PCR: NOT DETECTED
STREPTOCOCCUS SPECIES BY PCR: NOT DETECTED
TOTAL IRON BINDING CAPACITY: 173 MCG/DL (ref 250–450)
WBC # BLD: 3.8 E9/L (ref 4.5–11.5)

## 2021-08-18 PROCEDURE — 6370000000 HC RX 637 (ALT 250 FOR IP): Performed by: INTERNAL MEDICINE

## 2021-08-18 PROCEDURE — 83550 IRON BINDING TEST: CPT

## 2021-08-18 PROCEDURE — 36415 COLL VENOUS BLD VENIPUNCTURE: CPT

## 2021-08-18 PROCEDURE — 6360000002 HC RX W HCPCS: Performed by: INTERNAL MEDICINE

## 2021-08-18 PROCEDURE — 2580000003 HC RX 258: Performed by: INTERNAL MEDICINE

## 2021-08-18 PROCEDURE — 86900 BLOOD TYPING SEROLOGIC ABO: CPT

## 2021-08-18 PROCEDURE — 36430 TRANSFUSION BLD/BLD COMPNT: CPT

## 2021-08-18 PROCEDURE — 86901 BLOOD TYPING SEROLOGIC RH(D): CPT

## 2021-08-18 PROCEDURE — 83540 ASSAY OF IRON: CPT

## 2021-08-18 PROCEDURE — 1200000000 HC SEMI PRIVATE

## 2021-08-18 PROCEDURE — 86923 COMPATIBILITY TEST ELECTRIC: CPT

## 2021-08-18 PROCEDURE — 85014 HEMATOCRIT: CPT

## 2021-08-18 PROCEDURE — 80048 BASIC METABOLIC PNL TOTAL CA: CPT

## 2021-08-18 PROCEDURE — 83735 ASSAY OF MAGNESIUM: CPT

## 2021-08-18 PROCEDURE — 86850 RBC ANTIBODY SCREEN: CPT

## 2021-08-18 PROCEDURE — P9016 RBC LEUKOCYTES REDUCED: HCPCS

## 2021-08-18 PROCEDURE — 85018 HEMOGLOBIN: CPT

## 2021-08-18 PROCEDURE — 82728 ASSAY OF FERRITIN: CPT

## 2021-08-18 PROCEDURE — 99232 SBSQ HOSP IP/OBS MODERATE 35: CPT | Performed by: INTERNAL MEDICINE

## 2021-08-18 PROCEDURE — 85025 COMPLETE CBC W/AUTO DIFF WBC: CPT

## 2021-08-18 RX ORDER — POTASSIUM CHLORIDE 20 MEQ/1
40 TABLET, EXTENDED RELEASE ORAL ONCE
Status: COMPLETED | OUTPATIENT
Start: 2021-08-18 | End: 2021-08-18

## 2021-08-18 RX ORDER — SODIUM CHLORIDE 9 MG/ML
INJECTION, SOLUTION INTRAVENOUS PRN
Status: COMPLETED | OUTPATIENT
Start: 2021-08-18 | End: 2021-08-18

## 2021-08-18 RX ADMIN — WATER 2000 MG: 1 INJECTION INTRAMUSCULAR; INTRAVENOUS; SUBCUTANEOUS at 22:27

## 2021-08-18 RX ADMIN — CLOPIDOGREL BISULFATE 75 MG: 75 TABLET ORAL at 10:37

## 2021-08-18 RX ADMIN — WATER 1000 MG: 1 INJECTION INTRAMUSCULAR; INTRAVENOUS; SUBCUTANEOUS at 01:37

## 2021-08-18 RX ADMIN — ATORVASTATIN CALCIUM 40 MG: 40 TABLET, FILM COATED ORAL at 20:14

## 2021-08-18 RX ADMIN — FERROUS SULFATE TAB 325 MG (65 MG ELEMENTAL FE) 325 MG: 325 (65 FE) TAB at 08:44

## 2021-08-18 RX ADMIN — POTASSIUM CHLORIDE 40 MEQ: 1500 TABLET, EXTENDED RELEASE ORAL at 09:32

## 2021-08-18 RX ADMIN — Medication 10 ML: at 22:27

## 2021-08-18 RX ADMIN — APIXABAN 5 MG: 5 TABLET, FILM COATED ORAL at 14:36

## 2021-08-18 RX ADMIN — Medication 10 ML: at 08:45

## 2021-08-18 RX ADMIN — SODIUM CHLORIDE: 9 INJECTION, SOLUTION INTRAVENOUS at 14:56

## 2021-08-18 RX ADMIN — SODIUM CHLORIDE: 9 INJECTION, SOLUTION INTRAVENOUS at 22:37

## 2021-08-18 RX ADMIN — APIXABAN 5 MG: 5 TABLET, FILM COATED ORAL at 20:14

## 2021-08-18 RX ADMIN — FERROUS SULFATE TAB 325 MG (65 MG ELEMENTAL FE) 325 MG: 325 (65 FE) TAB at 15:27

## 2021-08-18 ASSESSMENT — PAIN SCALES - GENERAL
PAINLEVEL_OUTOF10: 0
PAINLEVEL_OUTOF10: 0

## 2021-08-18 NOTE — PROGRESS NOTES
Consult received and chart reviewed. Unfortunately, patient does not have an appropriate rehab diagnosis (UTI, sepsis) and [employer] will deny ARU level of care. Rec is [facility] at this time.

## 2021-08-18 NOTE — CONSULTS
Infectious Disease Consult Note     Admit Date: 8/16/2021  5:28 PM    Chief complaint: Generalized weakness    Reason for Consult: Gram variable eladio bacteremia    Requesting Physician:  Adam Worley MD      HISTORY OF PRESENT ILLNESS:    This is a 42-year-old female was brought into the hospital on August 16 with a chief complaint of fall and high-grade fever she has been feeling very weak and lethargic for past few days and had frequent falls at home and so family decided to bring there initial evaluation in the emergency room at the [address] she spiked a fever of 104 Fahrenheit and she appeared sick and septic hence admitted to the hospital and she was being treated with ceftriaxone under the suspicion of cystitis  Her COVID-19 test was undetected now her blood culture has turned positive for gram variable rods hence infectious disease consulted for further antibiotic recommendation  Patient has not spiked any fever and has no leukocytosis since admission    REVIEW OF SYSTEMS:    Constitutional:+ Fever, chills or rigors. No unexplained weight loss. HEENT: no headache, dizziness or lightheadedness. No sore throat or runny nose. Respiratory: + cough, chest pain, shortness of breath or wheeze. Cardiovascular: no chest pain or palpitations  Gastrointestinal: no nausea, vomiting, diarrhea, constipation. No blood in stool. Genitourinary: no dysuria, hematuria, urgency, frequency or incontinence. Musculoskeletal: no joint pain anywhere in body, or bodyache. Neurologic: + passing out,  + focal weakness , no seizure. Skin: no h/o rashes or easy bruising. Hematologic: no gum bleeding or easy bruising. No hemoptysis.     MEDICAL HISTORY  Past Medical History:   Diagnosis Date    Acute gastroenteritis 03/2010    Cerebral artery occlusion with cerebral infarction ([address]     Chronic back pain     Headache(784.0)     Hyperlipidemia 03/2010    Hypertension     Obesity     Osteoarthritis     Tobacco abuse         There is no immunization history on file for this patient. Past Surgical History:   Procedure Laterality Date    TUBAL LIGATION  1983     FAMILY HISTORY  family history includes Cancer in her brother and sister; Diabetes in her brother, sister, and sister; Heart Disease in her brother; Heart Disease (age of onset: 28) in her sister; High Blood Pressure in her brother and sister; High Cholesterol in her brother. SOCIAL HISTORY  Social History     Socioeconomic History    Marital status:      Spouse name: Not on file    Number of children: Not on file    Years of education: Not on file    Highest education level: Not on file   Occupational History    Not on file   Tobacco Use    Smoking status: Current Every Day Smoker     Packs/day: 1.00     Years: 48.00     Pack years: 48.00     Types: Cigarettes    Smokeless tobacco: Never Used    Tobacco comment: Wants to quit on her own first   Substance and Sexual Activity    Alcohol use: No    Drug use: No    Sexual activity: Never   Other Topics Concern    Not on file   Social History Narrative    Not on file     Social Determinants of Health     Financial Resource Strain: Low Risk     Difficulty of Paying Living Expenses: Not hard at all   Food Insecurity: No Food Insecurity    Worried About Running Out of Food in the Last Year: Never true    Viri of Food in the Last Year: Never true   Transportation Needs:     Lack of Transportation (Medical):      Lack of Transportation (Non-Medical):    Physical Activity:     Days of Exercise per Week:     Minutes of Exercise per Session:    Stress:     Feeling of Stress :    Social Connections:     Frequency of Communication with Friends and Family:     Frequency of Social Gatherings with Friends and Family:     Attends [employer] Services:     Active Member of Clubs or Organizations:     Attends Club or Organization Meetings:     Marital Status:    Intimate Partner Violence:     Fear 28.7* 25.8* 22.0*   MCV 68.3* 69.9* 70.3*    227 185     Recent Labs     08/16/21  1823 08/16/21  1823 08/17/21  0335 08/17/21  0335 08/18/21  0326     --  131*  --  138   K 3.2*  --  3.2*   < > 3.2*   CL 94*   < > 95*   < > 103   CO2 26   < > 29   < > 27   BUN 10  --  10  --  9   CREATININE 0.8  --  0.9  --  0.7   GFRAA >60  --  >60  --  >60   LABGLOM >60  --  >60  --  >60   GLUCOSE 181*  --  133*  --  106*   PROT 6.9  --   --   --   --    LABALBU 3.3*  --   --   --   --    CALCIUM 8.6  --  8.5*  --  8.2*   BILITOT 0.7  --   --   --   --    ALKPHOS 110*  --   --   --   --    AST 13  --   --   --   --    ALT <5  --   --   --   --     < > = values in this interval not displayed. BLOOD CX #1  Recent Labs     08/16/21  2227   BC Gram stain performed from blood culture bottle media  Gram variable rods  *     BLOOD CX #2  Recent Labs     08/16/21  2246   Carly Avriler Previously positive blood culture called  Gram stain performed from blood culture bottle media  Gram variable rods  *       WOUND culture  No results for input(s): WNDABS in the last 72 hours. URINE CULTURE  No results for input(s): LABURIN in the last 72 hours. ASSESSMENT & PLAN  50-year-old female admitted with high-grade fever now has gram variable rods bacteremia  Hypokalemia  Anemia  History of recent CVA    We will treat the patient with ceftriaxone 2 g IV daily while awaiting the cultures to finalize  Check urine culture      Thank you for consult.       Sol Lewis MD, FACP  8/18/2021  12:26 PM

## 2021-08-18 NOTE — CARE COORDINATION
[facility] declined pt; referral called to [place];if accepted, will need resp panel for covid per kelsey. Await decision. iv atb's for UTI. [facility] 11/24. will follow. Tiffany Devries.

## 2021-08-18 NOTE — PROGRESS NOTES
1900-patient alert and oriented in bed with no complaints of pain. Patient on room air stating with in normal limits. Ramirez is in place with no issue. Iv is clean dry and intact,. Call light with in reach. Will continue to monitor .

## 2021-08-18 NOTE — PROGRESS NOTES
[facility] Hospitalist   Progress Note    Admitting Date and Time: 8/16/2021  5:28 PM  Admit Dx: UTI (urinary tract infection) [N39.0]  Generalized weakness [R53.1]  Ambulatory dysfunction [R26.2]  Urinary tract infection without hematuria, site unspecified [N39.0]     Seen for follow on multiple problems as listed below. Subjective:  Denies CP ,sob or n/v/d/abd pain. Uneventful night. D/w nursing. ROS: denies fever, chills, cp, sob, n/v, HA unless stated above.  atorvastatin  40 mg Oral Nightly    clopidogrel  75 mg Oral Daily    sodium chloride flush  10 mL Intravenous 2 times per day    enoxaparin  40 mg Subcutaneous Daily    cefTRIAXone (ROCEPHIN) IV  1,000 mg Intravenous Q24H    ferrous sulfate  325 mg Oral BID WC     sodium chloride, , PRN  sodium chloride flush, 10 mL, PRN  sodium chloride, 25 mL, PRN  polyethylene glycol, 17 g, Daily PRN  acetaminophen, 650 mg, Q6H PRN   Or  acetaminophen, 650 mg, Q6H PRN         Objective:    /62   Pulse 74   Temp 98.5 °F (36.9 °C) (Oral)   Resp 18   Wt 162 lb (73.5 kg)   LMP 02/14/2011   SpO2 93%   BMI 28.70 kg/m²   General Appearance: alert ,in no acute distress  Skin: warm and dry  Head: normocephalic and atraumatic  Eyes: pupils equal, round, and reactive to light, extraocular eye movements intact, conjunctivae normal  Neck: supple and non-tender without mass  Pulmonary/Chest: clear to auscultation bilaterally  Cardiovascular: normal rate, regular rhythm, normal S1 and S2  Abdomen: soft, non-tender, non-distended, normal bowel sounds, no masses or organomegaly  Extremities: no cyanosis, clubbing   Musculoskeletal: normal range of motion  Neurologic:  no cranial nerve deficit, speech normal, slow to answer , follows commands.       Recent Labs     08/16/21  1823 08/17/21  0335 08/18/21  0326    131* 138   K 3.2* 3.2* 3.2*   CL 94* 95* 103   CO2 26 29 27   BUN 10 10 9   CREATININE 0.8 0.9 0.7   GLUCOSE 181* 133* 106* CALCIUM 8.6 8.5* 8.2*       Recent Labs     08/16/21  1823 08/17/21  0335 08/18/21  0326   WBC 7.5 5.2 3.8*   RBC 4.20 3.69 3.13*   HGB 8.2* 7.2* 6.2*   HCT 28.7* 25.8* 22.0*   MCV 68.3* 69.9* 70.3*   MCH 19.5* 19.5* 19.8*   MCHC 28.6* 27.9* 28.2*   RDW 20.5* 20.8* 20.9*    227 185   MPV 9.0 9.2 9.1       Labs and images reviewed     Radiology:   CT HEAD WO CONTRAST   Final Result   No acute intracranial abnormality. Age-related loss of brain volume and   chronic periventricular and subcortical white matter ischemic changes. Multifocal chronic infarcts as described. Multiple chronic small basal ganglia and corona radiata lacunar infarcts. XR CHEST PORTABLE   Final Result   No acute cardiopulmonary abnormality. XR HIP BILATERAL W AP PELVIS (2 VIEWS)   Final Result   1. No acute abnormality. 2. Calcified fibroid uterus. Assessment:    Active Problems:    UTI (urinary tract infection)  Resolved Problems:    * No resolved hospital problems. *      Plan:  Sepsis/ Bacteremia  secondary to UTI-sepsis present POA, with fever, tachycardia,hypotension , confusion on admission. On IV fluids, IV Rocephin and other supportive treatment. Blood cxs + for gm variable rods. Urine cxs pending. Follow cxs. ID consulted.     Iron deficiency anemia-on iron supplements. With drop in H /H today 6.2/22.0, will receive 1 unit packed RBC today . Will check Hemoccult and iron studies and follow. Will check with family regarding last colonoscopy. Hypertension-last BP on lower side 118/56. Continue to hold lisinopril. Monitor. S/p recent CVA in 5/21 , Hx small  PFO  - on plavix , statin    Hx recent acute DVT LLE - was supposed to be on eliquis , recheck with PCP Dr. Kamila Ventura as well as son Sixto Celestin , after hospitalization in May it was dropped out because never got refilled. She does not have a history of GI bleed. She never had any colonoscopy done.   Will resume Eliquis for recent DVT and

## 2021-08-18 NOTE — PROGRESS NOTES
Spoke with Dr. Malia Eaton regarding patients Plavix, after reviewing chart Dr. Ann-Marie Willson like Plavix given.      Electronically signed by Porsche Holder RN on 8/18/2021 at 10:33 AM

## 2021-08-19 LAB
ANION GAP SERPL CALCULATED.3IONS-SCNC: 7 MMOL/L (ref 7–16)
ANISOCYTOSIS: ABNORMAL
BASOPHILS ABSOLUTE: 0.02 E9/L (ref 0–0.2)
BASOPHILS RELATIVE PERCENT: 0.6 % (ref 0–2)
BUN BLDV-MCNC: 6 MG/DL (ref 6–23)
BURR CELLS: ABNORMAL
CALCIUM SERPL-MCNC: 8.1 MG/DL (ref 8.6–10.2)
CHLORIDE BLD-SCNC: 107 MMOL/L (ref 98–107)
CO2: 26 MMOL/L (ref 22–29)
CREAT SERPL-MCNC: 0.7 MG/DL (ref 0.5–1)
EOSINOPHILS ABSOLUTE: 0.17 E9/L (ref 0.05–0.5)
EOSINOPHILS RELATIVE PERCENT: 5 % (ref 0–6)
GFR AFRICAN AMERICAN: >60
GFR NON-AFRICAN AMERICAN: >60 ML/MIN/1.73
GLUCOSE BLD-MCNC: 113 MG/DL (ref 74–99)
HCT VFR BLD CALC: 25.5 % (ref 34–48)
HEMOGLOBIN: 7.2 G/DL (ref 11.5–15.5)
IMMATURE GRANULOCYTES #: 0.01 E9/L
IMMATURE GRANULOCYTES %: 0.3 % (ref 0–5)
LYMPHOCYTES ABSOLUTE: 0.73 E9/L (ref 1.5–4)
LYMPHOCYTES RELATIVE PERCENT: 21.3 % (ref 20–42)
MCH RBC QN AUTO: 20.1 PG (ref 26–35)
MCHC RBC AUTO-ENTMCNC: 28.2 % (ref 32–34.5)
MCV RBC AUTO: 71.2 FL (ref 80–99.9)
MONOCYTES ABSOLUTE: 0.21 E9/L (ref 0.1–0.95)
MONOCYTES RELATIVE PERCENT: 6.1 % (ref 2–12)
NEUTROPHILS ABSOLUTE: 2.28 E9/L (ref 1.8–7.3)
NEUTROPHILS RELATIVE PERCENT: 66.7 % (ref 43–80)
OVALOCYTES: ABNORMAL
PDW BLD-RTO: 21.5 FL (ref 11.5–15)
PLATELET # BLD: 201 E9/L (ref 130–450)
PMV BLD AUTO: 9.4 FL (ref 7–12)
POIKILOCYTES: ABNORMAL
POLYCHROMASIA: ABNORMAL
POTASSIUM REFLEX MAGNESIUM: 3.9 MMOL/L (ref 3.5–5)
RBC # BLD: 3.58 E12/L (ref 3.5–5.5)
SCHISTOCYTES: ABNORMAL
SODIUM BLD-SCNC: 140 MMOL/L (ref 132–146)
TEAR DROP CELLS: ABNORMAL
WBC # BLD: 3.4 E9/L (ref 4.5–11.5)

## 2021-08-19 PROCEDURE — 6370000000 HC RX 637 (ALT 250 FOR IP): Performed by: INTERNAL MEDICINE

## 2021-08-19 PROCEDURE — 36415 COLL VENOUS BLD VENIPUNCTURE: CPT

## 2021-08-19 PROCEDURE — 1200000000 HC SEMI PRIVATE

## 2021-08-19 PROCEDURE — 6360000002 HC RX W HCPCS: Performed by: INTERNAL MEDICINE

## 2021-08-19 PROCEDURE — 6360000002 HC RX W HCPCS: Performed by: SPECIALIST

## 2021-08-19 PROCEDURE — 97530 THERAPEUTIC ACTIVITIES: CPT

## 2021-08-19 PROCEDURE — 2580000003 HC RX 258: Performed by: INTERNAL MEDICINE

## 2021-08-19 PROCEDURE — 87088 URINE BACTERIA CULTURE: CPT

## 2021-08-19 PROCEDURE — 85025 COMPLETE CBC W/AUTO DIFF WBC: CPT

## 2021-08-19 PROCEDURE — 80048 BASIC METABOLIC PNL TOTAL CA: CPT

## 2021-08-19 PROCEDURE — 2580000003 HC RX 258: Performed by: SPECIALIST

## 2021-08-19 PROCEDURE — 99233 SBSQ HOSP IP/OBS HIGH 50: CPT | Performed by: INTERNAL MEDICINE

## 2021-08-19 PROCEDURE — 97535 SELF CARE MNGMENT TRAINING: CPT

## 2021-08-19 RX ADMIN — APIXABAN 5 MG: 5 TABLET, FILM COATED ORAL at 20:36

## 2021-08-19 RX ADMIN — ATORVASTATIN CALCIUM 40 MG: 40 TABLET, FILM COATED ORAL at 20:35

## 2021-08-19 RX ADMIN — SODIUM CHLORIDE 125 MG: 900 INJECTION INTRAVENOUS at 14:17

## 2021-08-19 RX ADMIN — FERROUS SULFATE TAB 325 MG (65 MG ELEMENTAL FE) 325 MG: 325 (65 FE) TAB at 16:19

## 2021-08-19 RX ADMIN — APIXABAN 5 MG: 5 TABLET, FILM COATED ORAL at 08:37

## 2021-08-19 RX ADMIN — POLYETHYLENE GLYCOL 3350 17 G: 17 POWDER, FOR SOLUTION ORAL at 15:47

## 2021-08-19 RX ADMIN — FERROUS SULFATE TAB 325 MG (65 MG ELEMENTAL FE) 325 MG: 325 (65 FE) TAB at 08:37

## 2021-08-19 RX ADMIN — Medication 10 ML: at 08:38

## 2021-08-19 RX ADMIN — Medication 10 ML: at 20:36

## 2021-08-19 RX ADMIN — CLOPIDOGREL BISULFATE 75 MG: 75 TABLET ORAL at 08:37

## 2021-08-19 RX ADMIN — ERTAPENEM SODIUM 1000 MG: 1 INJECTION, POWDER, LYOPHILIZED, FOR SOLUTION INTRAMUSCULAR; INTRAVENOUS at 18:54

## 2021-08-19 ASSESSMENT — PAIN SCALES - GENERAL: PAINLEVEL_OUTOF10: 0

## 2021-08-19 NOTE — PROGRESS NOTES
[address] [place] [address] [facility]  Progress Note    SUBJECTIVE:  Chief Complaint   Patient presents with    Fall     frequent falls.  Nausea     Patient is tolerating medications. No reported adverse drug reactions. No nausea, vomiting, diarrhea. Up in chair feeling well. Ramirez removed this am  No fever  Review of systems:  As stated above in the chief complaint, otherwise negative. Medications:  Scheduled Meds:   ferric gluconate (FERRLECIT) IVPB  125 mg Intravenous Daily    apixaban  5 mg Oral BID    cefTRIAXone (ROCEPHIN) IV  2,000 mg Intravenous Q24H    atorvastatin  40 mg Oral Nightly    sodium chloride flush  10 mL Intravenous 2 times per day    ferrous sulfate  325 mg Oral BID WC     Continuous Infusions:   sodium chloride       PRN Meds:sodium chloride flush, sodium chloride, polyethylene glycol, acetaminophen **OR** acetaminophen    OBJECTIVE:  /63   Pulse 66   Temp 97.7 °F (36.5 °C) (Oral)   Resp 18   Wt 162 lb (73.5 kg)   LMP 2011   SpO2 96%   BMI 28.70 kg/m²   Temp  Av.1 °F (36.7 °C)  Min: 97.7 °F (36.5 °C)  Max: 98.4 °F (36.9 °C)  Constitutional: The patient is awake, alert, and oriented. Skin: Warm and dry. No rashes were noted. HEENT: Round and reactive pupils. Moist mucous membranes. No ulcerations or thrush. Neck: Supple to movements. Chest: No use of accessory muscles to breathe. Symmetrical expansion. No wheezing, crackles or rhonchi. Cardiovascular: S1 and S2 are rhythmic and regular. No murmurs appreciated. Abdomen: Positive bowel sounds to auscultation. Benign to palpation. No masses felt. No hepatosplenomegaly. Extremities: No clubbing, no cyanosis, no edema.   Lines: peripheral    Laboratory and Tests Review:  Lab Results   Component Value Date    WBC 3.4 (L) 2021    WBC 3.8 (L) 2021    WBC 5.2 2021    HGB 7.2 (L) 2021    HCT 25.5 (L) 2021    MCV 71.2 (L) 2021     2021 Lab Results   Component Value Date    NEUTROABS 2.28 08/19/2021    NEUTROABS 2.86 08/18/2021    NEUTROABS 4.36 08/17/2021     No results found for: CRPHS  Lab Results   Component Value Date    ALT <5 08/16/2021    AST 13 08/16/2021    ALKPHOS 110 (H) 08/16/2021    BILITOT 0.7 08/16/2021     Lab Results   Component Value Date     08/19/2021    K 3.9 08/19/2021     08/19/2021    CO2 26 08/19/2021    BUN 6 08/19/2021    CREATININE 0.7 08/19/2021    CREATININE 0.7 08/18/2021    CREATININE 0.9 08/17/2021    GFRAA >60 08/19/2021    LABGLOM >60 08/19/2021    GLUCOSE 113 08/19/2021    PROT 6.9 08/16/2021    LABALBU 3.3 08/16/2021    CALCIUM 8.1 08/19/2021    BILITOT 0.7 08/16/2021    ALKPHOS 110 08/16/2021    AST 13 08/16/2021    ALT <5 08/16/2021     No results found for: CRP  No results found for: [place]  Radiology:      Microbiology:   Blood cultures 8/16/2021: Anaerobic G FL in 2 of 4 bottles  Urine culture 8/20/2021: Negative so far  Rapid SARS-CoV-2: Not detected    ASSESSMENT:  · Fever. Resolved  · Anaerobic gram-positive eladio bacteremia associated to the above  · Hypokalemia  · Anemia  · History of recent CVA leukopenia    PLAN:  · Change Ceftriaxone to Ertapenem  · Repeat blood cultures  · CT of the abdomen and pelvis    LEYDA Augustine CNP  11:47 AM  8/19/2021     Patient seen and examined. I had a face to face encounter with the patient. Agree with exam.  Assessment and plan as outlined above and directed by me. Addition and corrections were done as deemed appropriate. My exam and plan include: Patient is doing well. She denies any pain. Blood cultures are showing gram-positive anaerobic rods. Source seems to be most likely intra-abdominal.  She does not complain of abdominal pain and has a benign exam.  We will go ahead and order a CT of the abdomen and pelvis with contrast to see if there is an intra-abdominal source of infection. Consolidate antibiotics to Ertapenem.   Spoke with nursing.     Angela Lorenzo MD  8/19/2021  6:10 PM

## 2021-08-19 NOTE — PROGRESS NOTES
[facility] Hospitalist   Progress Note    Admitting Date and Time: 8/16/2021  5:28 PM  Admit Dx: UTI (urinary tract infection) [N39.0]  Generalized weakness [R53.1]  Ambulatory dysfunction [R26.2]  Urinary tract infection without hematuria, site unspecified [N39.0]    Subjective:    Patient was admitted with UTI (urinary tract infection) [N39.0]  Generalized weakness [R53.1]  Ambulatory dysfunction [R26.2]  Urinary tract infection without hematuria, site unspecified [N39.0]. Awake and alert. Does understand she is in the hospital and remembers her stroke and other things. Just wants to go home. No complaints. ROS: denies fever, chills, cp, sob, n/v, HA unless stated above.  apixaban  5 mg Oral BID    cefTRIAXone (ROCEPHIN) IV  2,000 mg Intravenous Q24H    atorvastatin  40 mg Oral Nightly    clopidogrel  75 mg Oral Daily    sodium chloride flush  10 mL Intravenous 2 times per day    ferrous sulfate  325 mg Oral BID WC     sodium chloride flush, 10 mL, PRN  sodium chloride, 25 mL, PRN  polyethylene glycol, 17 g, Daily PRN  acetaminophen, 650 mg, Q6H PRN   Or  acetaminophen, 650 mg, Q6H PRN         Objective:    /63   Pulse 66   Temp 97.7 °F (36.5 °C) (Oral)   Resp 18   Wt 162 lb (73.5 kg)   LMP 02/14/2011   SpO2 96%   BMI 28.70 kg/m²   General Appearance: alert and oriented to person, place and time, in no acute distress   Skin: warm and dry, no rash or erythema  Head: normocephalic and atraumatic  Neck: supple and non-tender   Pulmonary/Chest: clear to auscultation bilaterally  Cardiovascular: normal rate, regular rhythm, normal S1 and S2, no murmurs  Abdomen: soft, non-tender, non-distended  Extremities: no edema  Neurologic: speech seems a little thick, slow to respond but awake, aware, alert, does have decent memory for past events. Upper extremities 4+ to 5/5 strength, lower extremities 4/5 strength but may be poor effort as well.       Recent Labs 08/17/21 0335 08/18/21 0326 08/19/21  0420   * 138 140   K 3.2* 3.2* 3.9   CL 95* 103 107   CO2 29 27 26   BUN 10 9 6   CREATININE 0.9 0.7 0.7   GLUCOSE 133* 106* 113*   CALCIUM 8.5* 8.2* 8.1*       Recent Labs     08/17/21 0335 08/17/21 0335 08/18/21 0326 08/18/21 1955 08/19/21  0420   WBC 5.2  --  3.8*  --  3.4*   RBC 3.69  --  3.13*  --  3.58   HGB 7.2*   < > 6.2* 7.1* 7.2*   HCT 25.8*   < > 22.0* 24.7* 25.5*   MCV 69.9*  --  70.3*  --  71.2*   MCH 19.5*  --  19.8*  --  20.1*   MCHC 27.9*  --  28.2*  --  28.2*   RDW 20.8*  --  20.9*  --  21.5*     --  185  --  201   MPV 9.2  --  9.1  --  9.4    < > = values in this interval not displayed. Radiology:   CT HEAD WO CONTRAST   Final Result   No acute intracranial abnormality. Age-related loss of brain volume and   chronic periventricular and subcortical white matter ischemic changes. Multifocal chronic infarcts as described. Multiple chronic small basal ganglia and corona radiata lacunar infarcts. XR CHEST PORTABLE   Final Result   No acute cardiopulmonary abnormality. XR HIP BILATERAL W AP PELVIS (2 VIEWS)   Final Result   1. No acute abnormality. 2. Calcified fibroid uterus. Assessment:    Active Problems:    UTI (urinary tract infection)  Resolved Problems:    * No resolved hospital problems. *      Plan:    1. Mental status changes  Due to sepsis - bacteremia and UTI. Also had right parietal stroke in May, symptoms seem to have been mostly mental status related and not motor. 2.  Sepsis/bateremia  Secondary to UTI. Urine culture not done until 8/17 and is pending. 2/2 blood cultures positive for gram variable rods. ID following. Patient on rocephin at this time. No fever. 3.  Anemia  Hgb 8.2 on admission, but had IVF and dropped to 6.2 - transfused one unit and now up to 7.2 for 2 days. No bleeding seen. However iron level is very low. Will give iv iron as well.   Watch hgb daily.    4.  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 Patient should be on eliquis. Also on plavix and if embolic I don't see this as additive. Given low hgb will stop plavix but eliquis necessary at this time. 5.  History of DVT  On eliquis as above.     Electronically signed by Krys Gregg MD on 8/19/2021 at 10:32 AM

## 2021-08-19 NOTE — PROGRESS NOTES
Occupational Therapy  OT BEDSIDE TREATMENT NOTE      Date:2021  Patient Name: Sonia Trujillo  MRN: [id number]  : 1955  Room: [address] [place] [address]     Evaluating OT: Aquilino Mixon, OTR/JOCELYNN - [id number]     Referring Provider: Lydia Sales MD  Specific Provider Orders/Date: \"OT eval and treat\" - 2021     Diagnosis: UTI (urinary tract infection) [N39.0], Generalized weakness [R53.1], Ambulatory dysfunction [R26.2], Urinary tract infection without hematuria, site unspecified [N39.0]      Pertinent Medical History: CVA, obesity, HTN, OA, chronic back pain      Precautions: fall risk, bed alarm, L surgical shoe     Assessment of Current Deficits:    [x]? Functional mobility             [x]?ADLs           [x]? Strength                  [x]? Cognition   [x]? Functional transfers           [x]? IADLs         [x]? Safety Awareness   [x]? Endurance   []? Fine Coordination              [x]? Balance      []? Vision/perception   [x]? Sensation     []? Gross Motor Coordination  []? ROM           []? Delirium                   []? Motor Control      OT PLAN OF CARE   OT POC is based on physician orders, patient diagnosis, and results of clinical assessment.   Frequency/Duration 2-5 days/week for 2 weeks PRN   Specific OT Treatment Interventions to Include:   * Instruction/training on adapted ADL techniques and AE recommendations to increase functional independence within precautions       * Training on energy conservation strategies, correct breathing pattern and techniques to improve independence/tolerance for self-care routine  * Functional transfer/mobility training/DME recommendations for increased independence, safety, and fall prevention  * Patient/Family education to increase follow through with safety techniques and functional independence  * Recommendation of environmental modifications for increased safety with functional transfers/mobility and ADLs  * Cognitive retraining/development of therapeutic activities to improve problem solving, judgement, memory, and attention for increased safety/participation in ADL/IADL tasks  * Visual-perceptual training to improve environmental scanning, visual attention/focus, and oculomotor skills for increased safety/independence with functional transfers/mobility and ADLs  * Therapeutic exercise to improve motor endurance, ROM, and functional strength for ADLs/functional transfers  * Therapeutic activities to facilitate/challenge dynamic balance, stand tolerance for increased safety and independence with ADLs  * Therapeutic activities to facilitate gross/fine motor skills for increased independence with ADLs  * Neuro-muscular re-education: facilitation of righting/equilibrium reactions, midline orientation, scapular stability/mobility, normalization of muscle tone, and facilitation of volitional active controled movement     Recommended Adaptive Equipment: TBD     Home Living: Patient reported that she lives with her son (who works) in a two-floor home (few steps to enter); patient noted that she stays on the main living level where she sleeps on the couch. Bathroom Setup: tub - patient reported that she sits down in the tub to bathe  Equipment Owned: walker     Prior Level of Function (PLOF): Patient reported that she was independent with ADLs and some light, home-based IADLs (e.g. meal prep tasks); patient noted that her son assists with laundry and other IADLs. Patient reported that she was independent with functional mobility (with walker) prior to this hospitalization. Patient is a questionable historian; no family/caregiver present to verify information gathered.       Pain Level: Pt reported she has no pain. Cognition: Patient alert and grossly oriented.   Cues for safety.       Functional Assessment:                  AM-PAC Daily Activity Raw Score: 17/24    Initial Eval Status  Date: 8/17/2021 Treatment Status  Date: 8/19/21  Short Term Goals = Long Term Goals   Feeding SBA   Setup   Grooming Mod A Setup for grooming activity at sink. Pt did experience LOB while standing at the sink with min A required.   SBA  (seated/standing at sink)   UB Dressing Min A to doff/don hospital gown. Min A   Setup   LB Dressing Max A needed to don socks and L surgical shoe.  min A   Pt able to thread brief over feet. Assist to don L sock and surgical shoe. Min A balance while arranging clothing. SBA - with use of AE, as needed/appropriate   Bathing Max A  SBA for UB sponge bathing tasks while seated at EOB.   SBA - with use of AE/DME, as needed/appropriate   Toileting Max A for hygiene (while standing with walker). Patient with suazo catheter currently.  min A for thorough hygiene. Supervision   Bed Mobility  Supine-to-Sit: Mod A  Sit-to-Supine: Min A  SBA supine to sit       Functional Transfers Sit-to-Stands: Mod A   from EOB  Cues needed to maximize safety with functional transfers.  min  A transfers from bed, chair, and toilet surfaces. Cues for hand placement  Supervision   Functional Mobility Mod A   (with walker) for short distance within patient's room; increased time needed. Consistent assistance needed with management of walker, particularly during turns, to ensure safety. Min A using w/w to and from bathroom. Assist to guide walker around environmental objects in room.   SBA with functional mobility (with device, as needed/appropriate) in order to maximize independence with ADLs/IADLs and other functional tasks. Balance Sitting: Fair+  (at EOB)  Standing: Poor+  (with walker) for short distance within patient's room. LOB noted during ADL activity while standing.   Fair dynamic standing balance during completion of ADLs/IADLs and other functional tasks. Activity Tolerance Limited  fair  Patient will demonstrate Good understanding and consistent implementation of energy conservation techniques and work simplification techniques into ADL routines.          B UE Strength R UE: 4/5

## 2021-08-19 NOTE — CARE COORDINATION
IV rocephin qd; referral called to [facility]; pt has hx CVA 2mos ago with [facility] ([facility]) rehab stay; [facility] 11/24. declined by [facility] acute rehab;iv accepted to [facility]; will require resp panel for Covid. Will follow ID recs. [facility]. Per kelsey; [facility] accepts; will initiate precert  Today. [facility].

## 2021-08-19 NOTE — PROGRESS NOTES
Physical Therapy    Facility/Department: [address] MED SURG/TELE  Treatment Note  NAME: Jian Nicole  : 1955  MRN: [id number]    Date of Service: 2021    Evaluating Therapist: Sierra Graf PT     Referring Provider:  Griselda Azevedo MD    PT order : PT eval and treat     Room #:  198   DIAGNOSIS:  UTI . Recent falls   L 5th metatarsal fx 2021  - pt wears surgical shoe L foot  PRECAUTIONS: falls, bed/chair alarm    Social:  Pt lives with  Son  in a  2  floor plan with first floor set up,   2 steps and  1  rails to enter. Prior to admission pt walked with ww      Initial Evaluation  Date:  2021  Treatment      Short Term/ Long Term   Goals   Was pt agreeable to Eval/treatment? yes  yes    Does pt have pain?  none reported  No c/o pain    Bed Mobility  Rolling:  NT   Supine to sit:  Min/mod assist   Sit to supine: mod assist   Scooting: mod assist  Rolling: supervision  Supine to sit: supervision  Sit to supine: supervision  Scooting: supervision  Independent    Transfers Sit to stand: mod assist   Stand to sit: mod assist   Stand pivot: Mod assist  Sit to stand: SBA  Stand to sit: SBA  Stand pivot: CGA/MIN A  supervision   Ambulation     20  feet with ww  with  Mod assist  15 feet x 2 reps and 100 feet with ww CGA/MIN A    200 feet with ww SBA    Stair negotiation: ascended and descended NT  4 steps with 1 rail MIN A  4  steps with  1  rail with  CGA    AM- PAC RAW score   24  18/24      BLE ROM is WFL  BLE Strength is grossly 4/5 to 4+/5  Balance: sitting EOB supervision and standing with ww SBA    Patient education  Pt educated on hand placement during transfers and staying closer to ww during amb when she get fatigued. Patient response to education:   Pt verbalized understanding Pt demonstrated skill Pt requires further education in this area   yes Yes with VCs yes     ASSESSMENT:    Comments:  Pt was found in bed with call light on and asking to use BR.   She reported she does not use post-op shoe to walk to/from BR and walked with ww to and from BR with CGA. She transferred on/off commode with grab bar and SBA. She performed self hygiene care then stood at sink with SBA to wash her hands. Pt walked back to chair next to her bed and sat down. While sitting post-op shoe was donned and then she walked in the sharp with ww and performed stairs. While walking back to her room she was becoming fatigued and ww at times was getting too far out in front of her and she required VCs and MIN A to correct her posture for safety and to prevent falls. Pt's strength and endurance is still limiting her Tad, but much improved when looking at PT eval 8/17/21. Treatment:  Patient practiced and was instructed in the following treatment:     Bed mobility, transfers, ADLs, gait with ww, and stairs to improve functional strength, balance, posture, and endurance to decrease her risk of falls. Pt was left supine in bed with call light left by patient. Chair/bed alarm: bed alarm was re-activated. PLAN:    Pt is making good progress toward established Physical Therapy goals and PT goals were updated based on pt's performance today. Continue with physical therapy current plan of care. Time in  12:50  Time out  13:15    Total Treatment Time  25 minutes     Evaluation Time includes thorough review of current medical information, gathering information on past medical history/social history and prior level of function, completion of standardized testing/informal observation of tasks, assessment of data and education on plan of care and goals.     CPT codes:  [] Low Complexity PT evaluation 19052  [] Moderate Complexity PT evaluation 06170  [] High Complexity PT evaluation 52039  [] PT Re-evaluation 26103  [] Gait training 63565 ** minutes  [] Manual therapy 81856 ** minutes  [x] Therapeutic activities 72589 25 minutes  [] Therapeutic exercises 08112 ** minutes  [] Neuromuscular reeducation

## 2021-08-20 ENCOUNTER — APPOINTMENT (OUTPATIENT)
Dept: CT IMAGING | Age: 66
DRG: 854 | End: 2021-08-20
Payer: MEDICARE

## 2021-08-20 ENCOUNTER — APPOINTMENT (OUTPATIENT)
Dept: ULTRASOUND IMAGING | Age: 66
DRG: 854 | End: 2021-08-20
Payer: MEDICARE

## 2021-08-20 LAB
ANION GAP SERPL CALCULATED.3IONS-SCNC: 9 MMOL/L (ref 7–16)
ANISOCYTOSIS: ABNORMAL
BASOPHILS ABSOLUTE: 0.02 E9/L (ref 0–0.2)
BASOPHILS RELATIVE PERCENT: 0.5 % (ref 0–2)
BUN BLDV-MCNC: 6 MG/DL (ref 6–23)
BURR CELLS: ABNORMAL
C-REACTIVE PROTEIN: 2.1 MG/DL (ref 0–0.4)
CALCIUM SERPL-MCNC: 7.9 MG/DL (ref 8.6–10.2)
CEA: 92.3 NG/ML (ref 0–5.2)
CHLORIDE BLD-SCNC: 105 MMOL/L (ref 98–107)
CO2: 25 MMOL/L (ref 22–29)
CREAT SERPL-MCNC: 0.6 MG/DL (ref 0.5–1)
EOSINOPHILS ABSOLUTE: 0.23 E9/L (ref 0.05–0.5)
EOSINOPHILS RELATIVE PERCENT: 5.4 % (ref 0–6)
GFR AFRICAN AMERICAN: >60
GFR NON-AFRICAN AMERICAN: >60 ML/MIN/1.73
GLUCOSE BLD-MCNC: 128 MG/DL (ref 74–99)
HCT VFR BLD CALC: 25.2 % (ref 34–48)
HEMOGLOBIN: 7.1 G/DL (ref 11.5–15.5)
HYPOCHROMIA: ABNORMAL
IMMATURE GRANULOCYTES #: 0.01 E9/L
IMMATURE GRANULOCYTES %: 0.2 % (ref 0–5)
LYMPHOCYTES ABSOLUTE: 0.86 E9/L (ref 1.5–4)
LYMPHOCYTES RELATIVE PERCENT: 20.1 % (ref 20–42)
MAGNESIUM: 2.2 MG/DL (ref 1.6–2.6)
MCH RBC QN AUTO: 20.1 PG (ref 26–35)
MCHC RBC AUTO-ENTMCNC: 28.2 % (ref 32–34.5)
MCV RBC AUTO: 71.4 FL (ref 80–99.9)
MONOCYTES ABSOLUTE: 0.2 E9/L (ref 0.1–0.95)
MONOCYTES RELATIVE PERCENT: 4.7 % (ref 2–12)
NEUTROPHILS ABSOLUTE: 2.95 E9/L (ref 1.8–7.3)
NEUTROPHILS RELATIVE PERCENT: 69.1 % (ref 43–80)
OVALOCYTES: ABNORMAL
PDW BLD-RTO: 21.7 FL (ref 11.5–15)
PLATELET # BLD: 214 E9/L (ref 130–450)
PMV BLD AUTO: 9.1 FL (ref 7–12)
POIKILOCYTES: ABNORMAL
POLYCHROMASIA: ABNORMAL
POTASSIUM REFLEX MAGNESIUM: 3.3 MMOL/L (ref 3.5–5)
RBC # BLD: 3.53 E12/L (ref 3.5–5.5)
SCHISTOCYTES: ABNORMAL
SEDIMENTATION RATE, ERYTHROCYTE: 35 MM/HR (ref 0–20)
SODIUM BLD-SCNC: 139 MMOL/L (ref 132–146)
WBC # BLD: 4.3 E9/L (ref 4.5–11.5)

## 2021-08-20 PROCEDURE — 6360000002 HC RX W HCPCS: Performed by: INTERNAL MEDICINE

## 2021-08-20 PROCEDURE — 6370000000 HC RX 637 (ALT 250 FOR IP): Performed by: INTERNAL MEDICINE

## 2021-08-20 PROCEDURE — 6360000002 HC RX W HCPCS: Performed by: SPECIALIST

## 2021-08-20 PROCEDURE — 36415 COLL VENOUS BLD VENIPUNCTURE: CPT

## 2021-08-20 PROCEDURE — 80048 BASIC METABOLIC PNL TOTAL CA: CPT

## 2021-08-20 PROCEDURE — 1200000000 HC SEMI PRIVATE

## 2021-08-20 PROCEDURE — 74177 CT ABD & PELVIS W/CONTRAST: CPT

## 2021-08-20 PROCEDURE — 83735 ASSAY OF MAGNESIUM: CPT

## 2021-08-20 PROCEDURE — 2580000003 HC RX 258: Performed by: INTERNAL MEDICINE

## 2021-08-20 PROCEDURE — 93970 EXTREMITY STUDY: CPT

## 2021-08-20 PROCEDURE — 85025 COMPLETE CBC W/AUTO DIFF WBC: CPT

## 2021-08-20 PROCEDURE — 87040 BLOOD CULTURE FOR BACTERIA: CPT

## 2021-08-20 PROCEDURE — 99233 SBSQ HOSP IP/OBS HIGH 50: CPT | Performed by: INTERNAL MEDICINE

## 2021-08-20 PROCEDURE — 86140 C-REACTIVE PROTEIN: CPT

## 2021-08-20 PROCEDURE — 85651 RBC SED RATE NONAUTOMATED: CPT

## 2021-08-20 PROCEDURE — 2580000003 HC RX 258: Performed by: SPECIALIST

## 2021-08-20 PROCEDURE — 6360000004 HC RX CONTRAST MEDICATION: Performed by: RADIOLOGY

## 2021-08-20 PROCEDURE — 82378 CARCINOEMBRYONIC ANTIGEN: CPT

## 2021-08-20 RX ADMIN — FERROUS SULFATE TAB 325 MG (65 MG ELEMENTAL FE) 325 MG: 325 (65 FE) TAB at 11:44

## 2021-08-20 RX ADMIN — IOPAMIDOL 75 ML: 755 INJECTION, SOLUTION INTRAVENOUS at 10:36

## 2021-08-20 RX ADMIN — IOHEXOL 50 ML: 240 INJECTION, SOLUTION INTRATHECAL; INTRAVASCULAR; INTRAVENOUS; ORAL at 10:36

## 2021-08-20 RX ADMIN — SODIUM CHLORIDE 125 MG: 900 INJECTION INTRAVENOUS at 08:53

## 2021-08-20 RX ADMIN — Medication 10 ML: at 23:01

## 2021-08-20 RX ADMIN — APIXABAN 5 MG: 5 TABLET, FILM COATED ORAL at 11:44

## 2021-08-20 RX ADMIN — ATORVASTATIN CALCIUM 40 MG: 40 TABLET, FILM COATED ORAL at 23:01

## 2021-08-20 RX ADMIN — ERTAPENEM SODIUM 1000 MG: 1 INJECTION, POWDER, LYOPHILIZED, FOR SOLUTION INTRAMUSCULAR; INTRAVENOUS at 18:27

## 2021-08-20 RX ADMIN — Medication 10 ML: at 08:54

## 2021-08-20 RX ADMIN — FERROUS SULFATE TAB 325 MG (65 MG ELEMENTAL FE) 325 MG: 325 (65 FE) TAB at 16:09

## 2021-08-20 ASSESSMENT — PAIN SCALES - GENERAL
PAINLEVEL_OUTOF10: 0

## 2021-08-20 NOTE — CONSULTS
GENERAL SURGERY  CONSULT NOTE  8/20/2021    Physician Consulted: Dr. Ranelle Heimlich  Reason for Consult: Cecal mass  Referring Physician: Dr. Theresa SAEED  Mario Eden is a 73 y.o. female with hx of cerebral infarction (on Eliquis last dose 1200) who presented 8/17 following a fall and AMS. She was found to have a fever of 104 in ED and admitted for management of sepsis. CT-AP was completed to look for intraabdominal source of infection, which showed a large cecal mass with extension into and encasement of the terminal ileum. Hb has been low throughout admission 6.2 on 8/18 requiring transfusion of prbc . Patient has no abdominal complaints, states she has had 10lbs weight last for past month due to reduced appetite. Denies feeling distended. any fever, chills, night sweats. She has 3 siblings with cancers (brain, stomach, and unknown). Patient has 48 pack year history. Denies abdominal pain, change in stool calibre or colour, constipation. No previous abdominal surgeries. She has never had a colonoscopy. She has been afebrile, hemodynamically stable. Seems confused but AOx3., Abdomen is soft non-distended non-tender no palpable masses. Rectal exam no masses or lesions appreciated. FOBT (-). WBC: 4.3, Hb: 7.1 (baseline: mid 8's). Past Medical History:   Diagnosis Date    Acute gastroenteritis 03/2010    Cerebral artery occlusion with cerebral infarction ([facility] [address]     Chronic back pain     Headache(784.0)     Hyperlipidemia 03/2010    Hypertension     Obesity     Osteoarthritis     Tobacco abuse        Past Surgical History:   Procedure Laterality Date    TUBAL LIGATION  1983       Medications Prior to Admission:    Prior to Admission medications    Medication Sig Start Date End Date Taking?  Authorizing Provider   atorvastatin (LIPITOR) 40 MG tablet Take 1 tablet by mouth nightly 7/14/21  Yes Brennan Felty,    clopidogrel (PLAVIX) 75 MG tablet Take 1 tablet by mouth daily 7/14/21  Yes [facility] HPI    LABS:    CBC  Recent Labs     08/20/21  0539   WBC 4.3*   HGB 7.1*   HCT 25.2*        BMP  Recent Labs     08/20/21  0539      K 3.3*      CO2 25   BUN 6   CREATININE 0.6   CALCIUM 7.9*     Liver Function  No results for input(s): AMYLASE, LIPASE, BILITOT, BILIDIR, AST, ALT, ALKPHOS, PROT, LABALBU in the last 72 hours. No results for input(s): LACTATE in the last 72 hours. No results for input(s): INR, PTT in the last 72 hours. Invalid input(s): PT    RADIOLOGY    XR HIP BILATERAL W AP PELVIS (2 VIEWS)    Result Date: 8/16/2021  EXAMINATION: ONE XRAY VIEW OF THE PELVIS AND TWO XRAY VIEWS OF EACH OF THE BILATERAL HIPS 8/16/2021 5:46 pm COMPARISON: None. HISTORY: ORDERING SYSTEM PROVIDED HISTORY: fall eval for hip fracture TECHNOLOGIST PROVIDED HISTORY: Reason for exam:->fall eval for hip fracture FINDINGS: The pelvic bones are intact without fracture or focal lesion. The sacroiliac joints and the pubic symphysis are unremarkable. A calcified fibroid is seen in the left pelvis. Radiographs of the bilateral hips reveal no evidence of fracture or joint dislocation. 1. No acute abnormality. 2. Calcified fibroid uterus. CT HEAD WO CONTRAST    Result Date: 8/16/2021  EXAMINATION: CT OF THE HEAD WITHOUT CONTRAST  8/16/2021 4:52 pm TECHNIQUE: CT of the head was performed without the administration of intravenous contrast. Dose modulation, iterative reconstruction, and/or weight based adjustment of the mA/kV was utilized to reduce the radiation dose to as low as reasonably achievable. COMPARISON: April 26 HISTORY: ORDERING SYSTEM PROVIDED HISTORY: fall eval for head injury TECHNOLOGIST PROVIDED HISTORY: Has a \"code stroke\" or \"stroke alert\" been called? ->No Reason for exam:->fall eval for head injury Decision Support Exception - unselect if not a suspected or confirmed emergency medical condition->Emergency Medical Condition (MA) FINDINGS: BRAIN/VENTRICLES: There is no acute intracranial hemorrhage, mass effect or midline shift. No abnormal extra-axial fluid collection. The gray-white differentiation is maintained without evidence of an acute infarct. There is prominence of the ventricles and sulci due to global parenchymal volume loss. There are nonspecific areas of hypoattenuation within the periventricular and subcortical white matter, which likely represent chronic microvascular ischemic change. Chronic infarction in the right temporoparietal region. Chronic infarction with encephalomalacia in the left cerebellar hemisphere, right occipital lobe, right frontal lobe. Multiple chronic basal ganglia and corona radiata lacunar infarcts. ORBITS: The visualized portion of the orbits demonstrate no acute abnormality. SINUSES: The visualized paranasal sinuses and mastoid air cells demonstrate no acute abnormality. SOFT TISSUES/SKULL: No acute abnormality of the visualized skull or soft tissues. No acute intracranial abnormality. Age-related loss of brain volume and chronic periventricular and subcortical white matter ischemic changes. Multifocal chronic infarcts as described. Multiple chronic small basal ganglia and corona radiata lacunar infarcts. XR CHEST PORTABLE    Result Date: 8/16/2021  EXAMINATION: ONE XRAY VIEW OF THE CHEST 8/16/2021 5:46 pm COMPARISON: 04/27/2021 HISTORY: ORDERING SYSTEM PROVIDED HISTORY: fall, eval for pneumonia TECHNOLOGIST PROVIDED HISTORY: Reason for exam:->fall, eval for pneumonia FINDINGS: The lungs are clear. The cardiomediastinal contour is unremarkable. No pneumothorax or pleural effusion is seen. The visualized bones are grossly intact. No acute cardiopulmonary abnormality.          ASSESSMENT:  73 y.o. female with cecal mass on CTAP     PLAN:  -Colonoscopy 8/22 with Dr. Brayden Bazzi  - Prep with billie 8/21, clear liquid diet only 8/21 NPO @ midnight 8/22  -f/u Tumor marks- CEA   -INR   - Stop eliquis for now  - OR 8/23  - Chest CT w/ contrast 8/21    Electronically signed by Elaine Kwan MD on 8/20/21 at 3:15 PM EDT      Imaging reviewed, appears to be large nearly obstructing cecal mass with possible jordan disease. Liver appears free of disease. Will plan for colonoscopy evaluation Sunday 8/22 with Dr. Brayden Bazzi, CT Chest, and CEA. As long as workup dictates and okay from medical standpoint, will plan for right colectomy 8/23 as this will eventually fully obstruct.  Hold AC    Carisa Yanez MD  08/20/21  8:39 PM

## 2021-08-20 NOTE — CARE COORDINATION
covid neg  8/18; resides with son Derek Vickers now 18/24; appropriate for [place] [address]. NO preference; referral called to [address]; await call back; orders on chart. Discussed d/c plan with [facility]; plan is for probable discharge in am. Will follow. referral to [facility] cancelled. Marline Vu RN,CM. C[facility]orro Wallace at [facility]; have accepted; orders on chart; aware of possible discharge in am; can open on Monday. Israel Nugent.

## 2021-08-20 NOTE — PROGRESS NOTES
[facility] Hospitalist   Progress Note    Admitting Date and Time: 8/16/2021  5:28 PM  Admit Dx: UTI (urinary tract infection) [N39.0]  Generalized weakness [R53.1]  Ambulatory dysfunction [R26.2]  Urinary tract infection without hematuria, site unspecified [N39.0]    Subjective:    Patient was admitted with UTI (urinary tract infection) [N39.0]  Generalized weakness [R53.1]  Ambulatory dysfunction [R26.2]  Urinary tract infection without hematuria, site unspecified [N39.0]. To me patient looks better. Still seems a little slow, but unsure what baseline is. Today I stood patient and she was able to bear weight easily. PT eval yesterday improved markedly. She has no complaints at all. No abdominal pain. She had urinated in bed before I arrived and she said this is not normal for her, but did not seem disturbed by it. ROS: denies fever, chills, cp, sob, n/v, HA unless stated above.      [START ON 8/21/2021] polyethylene glycol  2,000 mL Oral Once    ertapenem (INVanz) IVPB  1,000 mg Intravenous Q24H    [Held by provider] apixaban  5 mg Oral BID    atorvastatin  40 mg Oral Nightly    sodium chloride flush  10 mL Intravenous 2 times per day    ferrous sulfate  325 mg Oral BID WC     sodium chloride flush, 10 mL, PRN  sodium chloride, 25 mL, PRN  polyethylene glycol, 17 g, Daily PRN  acetaminophen, 650 mg, Q6H PRN   Or  acetaminophen, 650 mg, Q6H PRN         Objective:    /65   Pulse 85   Temp 97.8 °F (36.6 °C) (Oral)   Resp 16   Wt 170 lb (77.1 kg)   LMP 02/14/2011   SpO2 95%   BMI 30.11 kg/m²   General Appearance: alert and oriented to person, place and time, in no acute distress   Skin: warm and dry, no rash or erythema  Head: normocephalic and atraumatic  Neck: supple and non-tender   Pulmonary/Chest: clear to auscultation bilaterally  Cardiovascular: normal rate, regular rhythm, normal S1 and S2, no murmurs  Abdomen: soft, non-tender, non-distended  Extremities: no edema      Recent Labs     08/18/21 0326 08/19/21  0420 08/20/21  0539    140 139   K 3.2* 3.9 3.3*    107 105   CO2 27 26 25   BUN 9 6 6   CREATININE 0.7 0.7 0.6   GLUCOSE 106* 113* 128*   CALCIUM 8.2* 8.1* 7.9*       Recent Labs     08/18/21 0326 08/18/21 0326 08/18/21 1955 08/19/21  0420 08/20/21  0539   WBC 3.8*  --   --  3.4* 4.3*   RBC 3.13*  --   --  3.58 3.53   HGB 6.2*   < > 7.1* 7.2* 7.1*   HCT 22.0*   < > 24.7* 25.5* 25.2*   MCV 70.3*  --   --  71.2* 71.4*   MCH 19.8*  --   --  20.1* 20.1*   MCHC 28.2*  --   --  28.2* 28.2*   RDW 20.9*  --   --  21.5* 21.7*     --   --  201 214   MPV 9.1  --   --  9.4 9.1    < > = values in this interval not displayed. Radiology:   CT ABDOMEN PELVIS W IV CONTRAST Additional Contrast? Oral   Final Result   1. Fair large cecal neoplasm causing encasement of the central lumen of the   bowel in the cecal area, with the extension and encasement into the terminal   ileum and the proximal ascending colon. 2.  Presence of satellite mesenteric adenopathy adjacent the cecal area, most   likely metastatic. 3.  Some inflammatory reaction surrounds the pericecal spaces. The could not   conspicuously identified the appendix. The small well-contained air density   seen peripherally to the cecum which can represent remnant of an appendix   also involved by cecal mass or a small area of well contained localized   perforation (images: A2/34; A3/90 A1/121). .         CT HEAD WO CONTRAST   Final Result   No acute intracranial abnormality. Age-related loss of brain volume and   chronic periventricular and subcortical white matter ischemic changes. Multifocal chronic infarcts as described. Multiple chronic small basal ganglia and corona radiata lacunar infarcts. XR CHEST PORTABLE   Final Result   No acute cardiopulmonary abnormality. XR HIP BILATERAL W AP PELVIS (2 VIEWS)   Final Result   1. No acute abnormality.    2. Calcified fibroid uterus. CT CHEST W CONTRAST    (Results Pending)       Assessment:    Active Problems:    UTI (urinary tract infection)  Resolved Problems:    * No resolved hospital problems. *      Plan:    1. Mental status changes  Due to sepsis - bacteremia.     Also had right parietal stroke in May, symptoms seem to have been mostly mental status related and not motor.     2. Sepsis/bateremia -   Thought initially secondary to UTI. Urine culture negative     2/2 blood cultures positive for gram variable rods. Antibiotics changed yesterday to ertapenem and now CT abdomen done to see if there is an intra-abdominal source shows large cecal mass likely a neoplasm with extension and encasement of the terminal ileum and proximal ascending colon.     3. Anemia  Hgb 8.2 on admission, but had IVF and dropped to 6.2 - transfused one unit and now up to 7.2 for 2 days.     No bleeding seen. However iron level is very low. Will give iv iron as well. Watch hgb daily. Likely bleeding chronically due to cecal mass. Stopping eliquis.     4.  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 Patient should be on eliquis. Also on plavix and if embolic I don't see this as additive. Given low hgb will stop plavix but eliquis necessary at this time. However now with mass, and      5. History of DVT  Stopping eliquis. Will duplex lower extremities, may need IVC filter. Cause of DVT almost certainly colon cancer. 6.  Large cecal mass  Surgery consulted, colonoscopy now scheduled, staging CT done.      Electronically signed by Fabi Garcia MD on 8/20/2021 at 7:19 PM

## 2021-08-20 NOTE — PLAN OF CARE
Problem: Falls - Risk of:  Goal: Will remain free from falls  Description: Will remain free from falls  8/20/2021 1038 by Belgica May  Outcome: Met This Shift  8/20/2021 0629 by Kaushik Jiménez RN  Outcome: Met This Shift  Goal: Absence of physical injury  Description: Absence of physical injury  8/20/2021 1038 by Belgica May  Outcome: Met This Shift  8/20/2021 0629 by Kaushik Jiménez RN  Outcome: Met This Shift

## 2021-08-20 NOTE — PROGRESS NOTES
[address] [place] [address] [facility]  Progress Note    SUBJECTIVE:  Chief Complaint   Patient presents with    Fall     frequent falls.  Nausea     Patient is tolerating medications. No reported adverse drug reactions. No nausea, vomiting. + dark loose stools   Denies abdominal pain     Review of systems:  As stated above in the chief complaint, otherwise negative. Medications:  Scheduled Meds:   ertapenem (INVanz) IVPB  1,000 mg Intravenous Q24H    apixaban  5 mg Oral BID    atorvastatin  40 mg Oral Nightly    sodium chloride flush  10 mL Intravenous 2 times per day    ferrous sulfate  325 mg Oral BID WC     Continuous Infusions:   sodium chloride       PRN Meds:sodium chloride flush, sodium chloride, polyethylene glycol, acetaminophen **OR** acetaminophen    OBJECTIVE:  /65   Pulse 85   Temp 97.8 °F (36.6 °C) (Oral)   Resp 16   Wt 170 lb (77.1 kg)   LMP 2011   SpO2 95%   BMI 30.11 kg/m²   Temp  Av.1 °F (36.7 °C)  Min: 97.8 °F (36.6 °C)  Max: 98.3 °F (36.8 °C)  Constitutional: The patient is awake, alert, and oriented. Incontinent of stools   Skin: Warm and dry. No rashes were noted. HEENT: Round and reactive pupils. Moist mucous membranes. No ulcerations or thrush. Neck: Supple to movements. Chest: No use of accessory muscles to breathe. Symmetrical expansion. No wheezing, crackles or rhonchi. Cardiovascular: S1 and S2 are rhythmic and regular. No murmurs appreciated. Abdomen: Positive bowel sounds to auscultation. Benign to palpation. No masses felt. Extremities: No edema.   Lines: peripheral    Laboratory and Tests Review:  Lab Results   Component Value Date    WBC 4.3 (L) 2021    WBC 3.4 (L) 2021    WBC 3.8 (L) 2021    HGB 7.1 (L) 2021    HCT 25.2 (L) 2021    MCV 71.4 (L) 2021     2021     Lab Results   Component Value Date    NEUTROABS 2.95 2021    NEUTROABS 2.28 2021    NEUTROABS 2.86 08/18/2021     No results found for: CRP  Lab Results   Component Value Date    ALT <5 08/16/2021    AST 13 08/16/2021    ALKPHOS 110 (H) 08/16/2021    BILITOT 0.7 08/16/2021     Lab Results   Component Value Date     08/20/2021    K 3.3 08/20/2021     08/20/2021    CO2 25 08/20/2021    BUN 6 08/20/2021    CREATININE 0.6 08/20/2021    CREATININE 0.7 08/19/2021    CREATININE 0.7 08/18/2021    GFRAA >60 08/20/2021    LABGLOM >60 08/20/2021    GLUCOSE 128 08/20/2021    PROT 6.9 08/16/2021    LABALBU 3.3 08/16/2021    CALCIUM 7.9 08/20/2021    BILITOT 0.7 08/16/2021    ALKPHOS 110 08/16/2021    AST 13 08/16/2021    ALT <5 08/16/2021     Lab Results   Component Value Date    CRP 2.1 (H) 08/20/2021     Lab Results   Component Value Date    SEDRATE 35 (H) 08/20/2021     Radiology:  Jami Joya A/P    Microbiology:   Blood cultures 8/16/2021: Anaerobic GPR in 2 of 4 bottles  Urine culture 8/20/2021: Negative so far  Rapid SARS-CoV-2: Not detected  8/20/2021 blood cultures: pending     ASSESSMENT:  · Fever. Resolved  · Anaerobic gram-positive eladio bacteremia associated to the above  · Hypokalemia  · Anemia  · History of recent CVA leukopenia    PLAN:  · Continue Ertapenem  · Repeat blood cultures  · CT of the abdomen and pelvis results pending  · Monitor stools    Tavares Loyola, APRN - CNP  2:02 PM  8/20/2021     Patient seen and examined. I had a face to face encounter with the patient. Agree with exam.  Assessment and plan as outlined above and directed by me. Addition and corrections were done as deemed appropriate. My exam and plan include: Patient is feeling better. Abdominal exam remains benign. Repeat blood cultures are negative so far. CT is pending.     Cheyenne Concepcion MD  8/20/2021  2:10 PM

## 2021-08-21 ENCOUNTER — ANESTHESIA EVENT (OUTPATIENT)
Dept: OPERATING ROOM | Age: 66
DRG: 854 | End: 2021-08-21
Payer: MEDICARE

## 2021-08-21 LAB
ANION GAP SERPL CALCULATED.3IONS-SCNC: 5 MMOL/L (ref 7–16)
ANISOCYTOSIS: ABNORMAL
BASOPHILS ABSOLUTE: 0.05 E9/L (ref 0–0.2)
BASOPHILS RELATIVE PERCENT: 0.8 % (ref 0–2)
BUN BLDV-MCNC: 6 MG/DL (ref 6–23)
BURR CELLS: ABNORMAL
CALCIUM SERPL-MCNC: 8.6 MG/DL (ref 8.6–10.2)
CEA: 78.9 NG/ML (ref 0–5.2)
CHLORIDE BLD-SCNC: 103 MMOL/L (ref 98–107)
CO2: 29 MMOL/L (ref 22–29)
CREAT SERPL-MCNC: 0.8 MG/DL (ref 0.5–1)
EOSINOPHILS ABSOLUTE: 0.31 E9/L (ref 0.05–0.5)
EOSINOPHILS RELATIVE PERCENT: 5.2 % (ref 0–6)
GFR AFRICAN AMERICAN: >60
GFR NON-AFRICAN AMERICAN: >60 ML/MIN/1.73
GLUCOSE BLD-MCNC: 116 MG/DL (ref 74–99)
HCT VFR BLD CALC: 28.5 % (ref 34–48)
HEMOGLOBIN: 8.2 G/DL (ref 11.5–15.5)
INR BLD: 1.3
LYMPHOCYTES ABSOLUTE: 1.18 E9/L (ref 1.5–4)
LYMPHOCYTES RELATIVE PERCENT: 20 % (ref 20–42)
MCH RBC QN AUTO: 20.8 PG (ref 26–35)
MCHC RBC AUTO-ENTMCNC: 28.8 % (ref 32–34.5)
MCV RBC AUTO: 72.3 FL (ref 80–99.9)
METAMYELOCYTES RELATIVE PERCENT: 0.9 % (ref 0–1)
MONOCYTES ABSOLUTE: 0.06 E9/L (ref 0.1–0.95)
MONOCYTES RELATIVE PERCENT: 0.9 % (ref 2–12)
NEUTROPHILS ABSOLUTE: 4.31 E9/L (ref 1.8–7.3)
NEUTROPHILS RELATIVE PERCENT: 72.2 % (ref 43–80)
NUCLEATED RED BLOOD CELLS: 0 /100 WBC
OVALOCYTES: ABNORMAL
PDW BLD-RTO: 22.3 FL (ref 11.5–15)
PLATELET # BLD: 265 E9/L (ref 130–450)
PMV BLD AUTO: 9.1 FL (ref 7–12)
POIKILOCYTES: ABNORMAL
POLYCHROMASIA: ABNORMAL
POTASSIUM REFLEX MAGNESIUM: 3.8 MMOL/L (ref 3.5–5)
PROTHROMBIN TIME: 14.5 SEC (ref 9.3–12.4)
RBC # BLD: 3.94 E12/L (ref 3.5–5.5)
SCHISTOCYTES: ABNORMAL
SODIUM BLD-SCNC: 137 MMOL/L (ref 132–146)
TEAR DROP CELLS: ABNORMAL
URINE CULTURE, ROUTINE: NORMAL
WBC # BLD: 5.9 E9/L (ref 4.5–11.5)

## 2021-08-21 PROCEDURE — 82378 CARCINOEMBRYONIC ANTIGEN: CPT

## 2021-08-21 PROCEDURE — 99222 1ST HOSP IP/OBS MODERATE 55: CPT | Performed by: TRANSPLANT SURGERY

## 2021-08-21 PROCEDURE — 99232 SBSQ HOSP IP/OBS MODERATE 35: CPT | Performed by: INTERNAL MEDICINE

## 2021-08-21 PROCEDURE — 6370000000 HC RX 637 (ALT 250 FOR IP): Performed by: INTERNAL MEDICINE

## 2021-08-21 PROCEDURE — 85025 COMPLETE CBC W/AUTO DIFF WBC: CPT

## 2021-08-21 PROCEDURE — 2580000003 HC RX 258: Performed by: INTERNAL MEDICINE

## 2021-08-21 PROCEDURE — 36415 COLL VENOUS BLD VENIPUNCTURE: CPT

## 2021-08-21 PROCEDURE — 6360000002 HC RX W HCPCS: Performed by: SPECIALIST

## 2021-08-21 PROCEDURE — 1200000000 HC SEMI PRIVATE

## 2021-08-21 PROCEDURE — 2580000003 HC RX 258: Performed by: SPECIALIST

## 2021-08-21 PROCEDURE — 85610 PROTHROMBIN TIME: CPT

## 2021-08-21 PROCEDURE — 6370000000 HC RX 637 (ALT 250 FOR IP): Performed by: SURGERY

## 2021-08-21 PROCEDURE — 80048 BASIC METABOLIC PNL TOTAL CA: CPT

## 2021-08-21 RX ADMIN — Medication 10 ML: at 22:30

## 2021-08-21 RX ADMIN — POLYETHYLENE GLYCOL 3350, SODIUM SULFATE ANHYDROUS, SODIUM BICARBONATE, SODIUM CHLORIDE, POTASSIUM CHLORIDE 4000 ML: 236; 22.74; 6.74; 5.86; 2.97 POWDER, FOR SOLUTION ORAL at 14:39

## 2021-08-21 RX ADMIN — ATORVASTATIN CALCIUM 40 MG: 40 TABLET, FILM COATED ORAL at 22:30

## 2021-08-21 RX ADMIN — ERTAPENEM SODIUM 1000 MG: 1 INJECTION, POWDER, LYOPHILIZED, FOR SOLUTION INTRAMUSCULAR; INTRAVENOUS at 18:20

## 2021-08-21 RX ADMIN — FERROUS SULFATE TAB 325 MG (65 MG ELEMENTAL FE) 325 MG: 325 (65 FE) TAB at 08:32

## 2021-08-21 RX ADMIN — Medication 10 ML: at 08:32

## 2021-08-21 RX ADMIN — FERROUS SULFATE TAB 325 MG (65 MG ELEMENTAL FE) 325 MG: 325 (65 FE) TAB at 18:00

## 2021-08-21 RX ADMIN — BISACODYL 10 MG: 5 TABLET, COATED ORAL at 12:20

## 2021-08-21 ASSESSMENT — ENCOUNTER SYMPTOMS
EYE DISCHARGE: 0
ABDOMINAL PAIN: 0
SHORTNESS OF BREATH: 0
NAUSEA: 0
BACK PAIN: 0
VOMITING: 0
BLOOD IN STOOL: 0
DIARRHEA: 0
PHOTOPHOBIA: 0
CONSTIPATION: 0
EYE PAIN: 0

## 2021-08-21 ASSESSMENT — PAIN SCALES - GENERAL
PAINLEVEL_OUTOF10: 0
PAINLEVEL_OUTOF10: 0

## 2021-08-21 ASSESSMENT — LIFESTYLE VARIABLES: SMOKING_STATUS: 1

## 2021-08-21 NOTE — PROGRESS NOTES
Hepatobiliary and Pancreatic Surgery Attending History and Physical    [facility] covering for Roopa Correa    Patient's Name/Date of Birth: Rosita Garvin /1955 (8 y.o.)    Date: August 21, 2021     CC: cecal mass    HPI:  Rosita Garvin is a 73 y.o. female with hx of cerebral infarction (on Eliquis last dose 1200) who presented 8/17 following a fall and AMS. She was found to have a fever of 104 in ED and admitted for management of sepsis. CT-AP was completed to look for intraabdominal source of infection, which showed a large cecal mass with extension into and encasement of the terminal ileum. Hb has been low throughout admission 6.2 on 8/18 requiring transfusion of prbc . Patient has no abdominal complaints, states she has had 10lbs weight last for past month due to reduced appetite. Denies feeling distended. any fever, chills, night sweats. She has 3 siblings with cancers (brain, stomach, and unknown). Patient has 48 pack year history. Denies abdominal pain, change in stool calibre or colour, constipation. No previous abdominal surgeries. She has never had a colonoscopy. She has been afebrile, hemodynamically stable. Seems confused but AOx3., Abdomen is soft non-distended non-tender no palpable masses. Rectal exam no masses or lesions appreciated. FOBT (-). WBC: 4.3, Hb: 7.1 (baseline: mid 8's).      Past Medical History:   Diagnosis Date    Acute gastroenteritis 03/2010    Cerebral artery occlusion with cerebral infarction ([facility] [address]     Chronic back pain     Headache(784.0)     Hyperlipidemia 03/2010    Hypertension     Obesity     Osteoarthritis     Tobacco abuse        Past Surgical History:   Procedure Laterality Date    TUBAL LIGATION  1983       Current Facility-Administered Medications   Medication Dose Route Frequency Provider Last Rate Last Admin    polyethylene glycol (GoLYTELY) solution 4,000 mL  4,000 mL Oral Once Flower Wheat MD        bisacodyl (DULCOLAX) EC tablet 10 mg  10 mg Oral Once Papo Villarreal MD        ertapenem [facility]) 1000 mg IVPB minibag  1,000 mg Intravenous Q24H Belle Lima MD   Stopped at 08/20/21 2202    [Held by provider] apixaban (ELIQUIS) tablet 5 mg  5 mg Oral BID Fabricio Colorado MD   5 mg at 08/20/21 1144    atorvastatin (LIPITOR) tablet 40 mg  40 mg Oral Nightly Jennifer Ortiz MD   40 mg at 08/20/21 2301    sodium chloride flush 0.9 % injection 10 mL  10 mL Intravenous 2 times per day Jj Morrell MD   10 mL at 08/21/21 2075    sodium chloride flush 0.9 % injection 10 mL  10 mL Intravenous PRN Jennifer Ortiz MD        0.9 % sodium chloride infusion  25 mL Intravenous PRN Jennifer Ortiz MD        polyethylene glycol (GLYCOLAX) packet 17 g  17 g Oral Daily PRN Jennifer Ortzi MD   17 g at 08/19/21 1547    acetaminophen (TYLENOL) tablet 650 mg  650 mg Oral Q6H PRN Jennifer Ortiz MD        Or    acetaminophen (TYLENOL) suppository 650 mg  650 mg Rectal Q6H PRN Jennifer Ortiz MD        ferrous sulfate (IRON 325) tablet 325 mg  325 mg Oral BID WC Jennifer Ortiz MD   325 mg at 08/21/21 1050       No Known Allergies    Family History   Problem Relation Age of Onset    Diabetes Sister     High Blood Pressure Sister     Cancer Sister         Thryoid    Diabetes Sister     Heart Disease Sister 28        Heart Failure    Cancer Brother     Diabetes Brother     Heart Disease Brother     High Blood Pressure Brother     High Cholesterol Brother        Social History     Socioeconomic History    Marital status:       Spouse name: Not on file    Number of children: Not on file    Years of education: Not on file    Highest education level: Not on file   Occupational History    Not on file   Tobacco Use    Smoking status: Current Every Day Smoker     Packs/day: 1.00     Years: 48.00     Pack years: 48.00     Types: Cigarettes    Smokeless tobacco: Never Used    Tobacco comment: Wants to quit on her own first   Substance and Sexual Activity    Alcohol use: No    Drug use: No    Sexual activity: Never   Other Topics Concern    Not on file   Social History Narrative    Not on file     Social Determinants of Health     Financial Resource Strain: Low Risk     Difficulty of Paying Living Expenses: Not hard at all   Food Insecurity: No Food Insecurity    Worried About Running Out of Food in the Last Year: Never true    [address] in the Last Year: Never true   Transportation Needs:     Lack of Transportation (Medical):  Lack of Transportation (Non-Medical):    Physical Activity:     Days of Exercise per Week:     Minutes of Exercise per Session:    Stress:     Feeling of Stress :    Social Connections:     Frequency of Communication with Friends and Family:     Frequency of Social Gatherings with Friends and Family:     Attends Judaism Services:     Active Member of Clubs or Organizations:     Attends Club or Organization Meetings:     Marital Status:    Intimate Partner Violence:     Fear of Current or Ex-Partner:     Emotionally Abused:     Physically Abused:     Sexually Abused:        ROS:   Review of Systems   Constitutional: Positive for unexpected weight change. Negative for chills, diaphoresis and fever. HENT: Negative for congestion, ear discharge, ear pain, hearing loss, nosebleeds and tinnitus. Eyes: Negative for photophobia, pain and discharge. Respiratory: Negative for shortness of breath. Cardiovascular: Negative for palpitations and leg swelling. Gastrointestinal: Negative for abdominal pain, blood in stool, constipation, diarrhea, nausea and vomiting. Endocrine: Negative for polydipsia. Genitourinary: Negative for frequency, hematuria and urgency. Musculoskeletal: Negative for back pain and neck pain. Skin: Negative for rash. Allergic/Immunologic: Negative for environmental allergies. Neurological: Negative for tremors and seizures.    Psychiatric/Behavioral: Negative for hallucinations and suicidal ideas. The patient is not nervous/anxious. Physical Exam:  Vitals:    08/21/21 0815   BP: (!) 144/61   Pulse: 82   Resp: 18   Temp: 97.6 °F (36.4 °C)   SpO2: 98%       PSYCH: mood and affect normal, alert and oriented x 3: No apparent distress, comfortable  EYES: Sclera white, pupils equal round and reactive to light  ENMT:  Hearing normal, trachea midline, ears externally intact  LYMPH: no obvious lympadenopathy in neck. RESP: Respiratory effort was normal with no retractions or use of accessory muscles. 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 - CT chest pending    Thank you for the consultation allowing me to take part in Ms. Wesmadiha malagon.      Nestor Castle M.D.  8/21/2021  11:32 AM

## 2021-08-21 NOTE — PROGRESS NOTES
[facility]   Progress Note    Admitting Date and Time: 8/16/2021  5:28 PM  Admit Dx: UTI (urinary tract infection) [N39.0]  Generalized weakness [R53.1]  Ambulatory dysfunction [R26.2]  Urinary tract infection without hematuria, site unspecified [N39.0]    Subjective:    Patient was admitted with UTI (urinary tract infection) [N39.0]  Generalized weakness [R53.1]  Ambulatory dysfunction [R26.2]  Urinary tract infection without hematuria, site unspecified [N39.0]. Seems more confused today. She did not know she had a colon mass, but does know she is going to get a colonoscopy. Has no pain, not participating in questions as well. Very flat affect. ROS: denies fever, chills, cp, sob, n/v, HA unless stated above.      ertapenem (INVanz) IVPB  1,000 mg Intravenous Q24H    [Held by provider] apixaban  5 mg Oral BID    atorvastatin  40 mg Oral Nightly    sodium chloride flush  10 mL Intravenous 2 times per day    ferrous sulfate  325 mg Oral BID WC     sodium chloride flush, 10 mL, PRN  sodium chloride, 25 mL, PRN  polyethylene glycol, 17 g, Daily PRN  acetaminophen, 650 mg, Q6H PRN   Or  acetaminophen, 650 mg, Q6H PRN         Objective:    BP (!) 144/61   Pulse 82   Temp 97.6 °F (36.4 °C) (Oral)   Resp 18   Ht 5' 3\" (1.6 m)   Wt 162 lb 0.6 oz (73.5 kg)   LMP 02/14/2011   SpO2 98%   BMI 28.70 kg/m²   General Appearance: alert and oriented to person, place and time, in no acute distress   Skin: warm and dry, no rash or erythema  Head: normocephalic and atraumatic  Neck: supple and non-tender   Pulmonary/Chest: clear to auscultation bilaterally  Cardiovascular: normal rate, regular rhythm, normal S1 and S2, no murmurs  Abdomen: soft, non-tender, non-distended  Extremities: no edema      Recent Labs     08/19/21  0420 08/20/21  0539 08/21/21  0425    139 137   K 3.9 3.3* 3.8    105 103   CO2 26 25 29   BUN 6 6 6   CREATININE 0.7 0.6 0.8   GLUCOSE 113* 128* 116* CALCIUM 8.1* 7.9* 8.6       Recent Labs     08/19/21  0420 08/20/21  0539 08/21/21  0425   WBC 3.4* 4.3* 5.9   RBC 3.58 3.53 3.94   HGB 7.2* 7.1* 8.2*   HCT 25.5* 25.2* 28.5*   MCV 71.2* 71.4* 72.3*   MCH 20.1* 20.1* 20.8*   MCHC 28.2* 28.2* 28.8*   RDW 21.5* 21.7* 22.3*    214 265   MPV 9.4 9.1 9.1       Radiology:   US DUP LOWER EXTREMITIES BILATERAL VENOUS   Final Result   No evidence of DVT in either lower extremity. CT ABDOMEN PELVIS W IV CONTRAST Additional Contrast? Oral   Final Result   1. Fair large cecal neoplasm causing encasement of the central lumen of the   bowel in the cecal area, with the extension and encasement into the terminal   ileum and the proximal ascending colon. 2.  Presence of satellite mesenteric adenopathy adjacent the cecal area, most   likely metastatic. 3.  Some inflammatory reaction surrounds the pericecal spaces. The could not   conspicuously identified the appendix. The small well-contained air density   seen peripherally to the cecum which can represent remnant of an appendix   also involved by cecal mass or a small area of well contained localized   perforation (images: A2/34; A3/90 A1/121). .         CT HEAD WO CONTRAST   Final Result   No acute intracranial abnormality. Age-related loss of brain volume and   chronic periventricular and subcortical white matter ischemic changes. Multifocal chronic infarcts as described. Multiple chronic small basal ganglia and corona radiata lacunar infarcts. XR CHEST PORTABLE   Final Result   No acute cardiopulmonary abnormality. XR HIP BILATERAL W AP PELVIS (2 VIEWS)   Final Result   1. No acute abnormality. 2. Calcified fibroid uterus. CT CHEST W CONTRAST    (Results Pending)       Assessment:    Active Problems:    UTI (urinary tract infection)  Resolved Problems:    * No resolved hospital problems.  *      Plan:    1.  Mental status changes  Due to sepsis - bacteremia.     Also had right parietal stroke in May, symptoms seem to have been mostly mental status related and not motor.     2.  Sepsis/bateremia -   Thought initially secondary to UTI. Urine culture negative     2/2 blood cultures positive for gram variable rods - clostridium septicum  Antibiotics changed to ertapenem and CT abdomen shows large cecal mass likely a neoplasm with extension and encasement of the terminal ileum and proximal ascending colon.     3.  Anemia  Hgb 8.2 on admission, but had IVF and dropped to 6.2 - transfused one unit and now up to 7.2 for 2 days.     No bleeding seen.  However iron level is very low.  Will give iv iron as well. Watch hgb daily.     Likely bleeding chronically due to cecal mass.     Stopping eliquis.     4.  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 Patient should be on eliquis. Also on plavix and if embolic I don't see this as additive. Given low hgb will stop plavix but eliquis necessary at this time.      However now with mass, and      5.  History of DVT  Stopping eliquis.     Lower extremities now negative for DVT. Will watch, patient at very high risk for recurrence, but no immediate indication for IVC filter.     6.  Large cecal mass  Surgery consulted, colonoscopy now scheduled, staging CT chest pending. Colonoscopy to be done tomorrow. CEA very high.     Electronically signed by Kelsea Pierre MD on 8/21/2021 at 2:51 PM

## 2021-08-21 NOTE — ANESTHESIA PRE PROCEDURE
Department of Anesthesiology  Preprocedure Note       Name:  Jian Nicole   Age:  73 y.o.  :  1955                                          MRN:  [id number]         Date:  2021      Surgeon: Griselda Dhaliwal):  Geoffrey [place] MD    Procedure: Procedure(s):  COLONOSCOPY    Medications prior to admission:   Prior to Admission medications    Medication Sig Start Date End Date Taking?  Authorizing Provider   atorvastatin (LIPITOR) 40 MG tablet Take 1 tablet by mouth nightly 21  Yes Luis F Shoulders, DO   clopidogrel (PLAVIX) 75 MG tablet Take 1 tablet by mouth daily 21  Yes Luis F Shoulders, DO   lisinopril (PRINIVIL;ZESTRIL) 5 MG tablet Take 1 tablet by mouth daily 21  Yes Luis F Shoulders, DO   Incontinence Supply Disposable MISC Large size 21   Luis F Shoulders, DO   Blood Pressure KIT 1 each by Does not apply route daily 5/10/21   Luis F Shoulders, DO       Current medications:    Current Facility-Administered Medications   Medication Dose Route Frequency Provider Last Rate Last Admin    polyethylene glycol (GoLYTELY) solution 4,000 mL  4,000 mL Oral Once Fabiana Stock MD        bisacodyl (DULCOLAX) EC tablet 10 mg  10 mg Oral Once Fabiana Stock MD        ertapenem Veronica Leghorn) 1000 mg IVPB minibag  1,000 mg Intravenous Q24H Geri Allred MD   Stopped at 21    [Held by provider] apixaban (ELIQUIS) tablet 5 mg  5 mg Oral BID Griselda Azevedo MD   5 mg at 21 1144    atorvastatin (LIPITOR) tablet 40 mg  40 mg Oral Nightly Jennifer Ortiz MD   40 mg at 21 230    sodium chloride flush 0.9 % injection 10 mL  10 mL Intravenous 2 times per day Shen Mendiola MD   10 mL at 21    sodium chloride flush 0.9 % injection 10 mL  10 mL Intravenous PRN Jennifer Ortiz MD        0.9 % sodium chloride infusion  25 mL Intravenous PRN Jennifer Ortiz MD        polyethylene glycol (GLYCOLAX) packet 17 g  17 g Oral Daily PRN Jennifer MD Angel   17 g at 08/19/21 1547    acetaminophen (TYLENOL) tablet 650 mg  650 mg Oral Q6H PRN Jennifer Ortiz MD        Or    acetaminophen (TYLENOL) suppository 650 mg  650 mg Rectal Q6H PRN Jennifer Ortiz MD        ferrous sulfate (IRON 325) tablet 325 mg  325 mg Oral BID WC Jennifer Ortiz MD   325 mg at 08/20/21 1609       Allergies:  No Known Allergies    Problem List:    Patient Active Problem List   Diagnosis Code    Tobacco abuse Z72.0    Hyperlipidemia E78.5    Stroke-like symptoms R29.90    Encephalopathy acute G93.40    Cerebrovascular accident (CVA) due to embolism of right middle cerebral artery (HCC) I63.411    Acute deep vein thrombosis (DVT) of calf muscle vein of left lower extremity (HCC) I82.462    PFO (patent foramen ovale) Q21.1    UTI (urinary tract infection) N39.0       Past Medical History:        Diagnosis Date    Acute gastroenteritis 03/2010    Cerebral artery occlusion with cerebral infarction ([address]     Chronic back pain     Headache(784.0)     Hyperlipidemia 03/2010    Hypertension     Obesity     Osteoarthritis     Tobacco abuse        Past Surgical History:        Procedure Laterality Date    TUBAL LIGATION  1983       Social History:    Social History     Tobacco Use    Smoking status: Current Every Day Smoker     Packs/day: 1.00     Years: 48.00     Pack years: 48.00     Types: Cigarettes    Smokeless tobacco: Never Used    Tobacco comment: Wants to quit on her own first   Substance Use Topics    Alcohol use:  No                                Ready to quit: Not Answered  Counseling given: Not Answered  Comment: Wants to quit on her own first      Vital Signs (Current):   Vitals:    08/19/21 1935 08/20/21 0034 08/20/21 0812 08/20/21 2300   BP: (!) 143/69  110/65 (!) 140/95   Pulse: 80  85 80   Resp: 16  16 18   Temp: 98.3 °F (36.8 °C)  97.8 °F (36.6 °C) 98 °F (36.7 °C)   TempSrc: Oral  Oral Oral   SpO2: 94%  95% 98%   Weight:  170 lb (77.1 kg) breath sounds,  current smoker (50 pyhx.)                          PE comment: Prolonged expiratory phase Cardiovascular:  Exercise tolerance: poor (<4 METS),   (+) hypertension: moderate, MEZA: no interval change, murmur (Grade II), hyperlipidemia      ECG reviewed  Rhythm: regular  Rate: normal  Echocardiogram reviewed         Beta Blocker:  Not on Beta Blocker      ROS comment: PFO    Sinus tachycardia  Possible Left atrial enlargement  Septal infarct , age undetermined  T wave abnormality, consider anterior ischemia  Abnormal ECG  No previous ECGs available    Summary  Ejection fraction is visually estimated at 60-65%. Atrial septum is aneurysmal.  Agitated saline injected for shunt evaluation. Small PFO noted with right to left shunting of bubbles. No significant valvular abnormalities. Neuro/Psych:   (+) CVA (Right middle cerebral artery): no interval change, headaches: migraine headaches,              ROS comment: Acute encephalopathy GI/Hepatic/Renal:            ROS comment: UTI    Acute gastroenteritis    Per Oncology:  HPI:  Winnie Campbell a 73 y. o. female with hx of cerebral infarction (on Eliquis last dose 1200) who presented 8/17 following a fall and AMS. She was found to have a fever of 104 in ED and admitted for management of sepsis. CT-AP was completed to look for intraabdominal source of infection, which showed a large cecal mass with extension into and encasement of the terminal ileum. Hb has been low throughout admission 6.2 on 8/18 requiring transfusion of prbc. Endo/Other:    (+) blood dyscrasia (8.2/28.5 on Plavix LD 8/16 s/p PRBC transfusion x 1 on 8/18): anticoagulation therapy and anemia, arthritis: OA., . Pt had no PAT visit        ROS comment: Chronic back pain    Pt. On Plavix on home but prescribed Eliquis whilst admitted (on hold) Abdominal:             Vascular:   + DVT, .        Other Findings:           Anesthesia Plan      MAC     ASA 4       Induction: intravenous. Anesthetic plan and risks discussed with patient. Plan discussed with CRNA.                 Flory Platt DO   8/21/2021

## 2021-08-21 NOTE — PROGRESS NOTES
[address] [place] [address] [facility]  Progress Note    SUBJECTIVE:  Chief Complaint   Patient presents with    Fall     frequent falls.  Nausea     Patient is tolerating medications. No reported adverse drug reactions. No nausea, vomiting. No fevers overnight. Denies abdominal pain     Review of systems:  As stated above in the chief complaint, otherwise negative. Medications:  Scheduled Meds:   polyethylene glycol  4,000 mL Oral Once    ertapenem (INVanz) IVPB  1,000 mg Intravenous Q24H    [Held by provider] apixaban  5 mg Oral BID    atorvastatin  40 mg Oral Nightly    sodium chloride flush  10 mL Intravenous 2 times per day    ferrous sulfate  325 mg Oral BID WC     Continuous Infusions:   sodium chloride       PRN Meds:sodium chloride flush, sodium chloride, polyethylene glycol, acetaminophen **OR** acetaminophen    OBJECTIVE:  BP (!) 144/61   Pulse 82   Temp 97.6 °F (36.4 °C) (Oral)   Resp 18   Ht 5' 3\" (1.6 m)   Wt 162 lb 0.6 oz (73.5 kg)   LMP 2011   SpO2 98%   BMI 28.70 kg/m²   Temp  Av.8 °F (36.6 °C)  Min: 97.6 °F (36.4 °C)  Max: 98 °F (36.7 °C)  Constitutional: The patient is awake, alert, and oriented. Lying in bed in NAD  Skin: Warm and dry. No rashes were noted. HEENT: Round and reactive pupils. Moist mucous membranes. No ulcerations or thrush. Neck: Supple to movements. Chest: No use of accessory muscles to breathe. Symmetrical expansion. No wheezing, crackles or rhonchi. Cardiovascular: S1 and S2 are rhythmic and regular. No murmurs appreciated. Abdomen: Positive bowel sounds to auscultation. Nontender, nondistended. No masses felt. Extremities: No edema.   Lines: peripheral    Laboratory and Tests Review:  Lab Results   Component Value Date    WBC 5.9 2021    WBC 4.3 (L) 2021    WBC 3.4 (L) 2021    HGB 8.2 (L) 2021    HCT 28.5 (L) 2021    MCV 72.3 (L) 2021     2021     Lab Results Component Value Date    NEUTROABS 4.31 08/21/2021    NEUTROABS 2.95 08/20/2021    NEUTROABS 2.28 08/19/2021     No results found for: CRP  Lab Results   Component Value Date    ALT <5 08/16/2021    AST 13 08/16/2021    ALKPHOS 110 (H) 08/16/2021    BILITOT 0.7 08/16/2021     Lab Results   Component Value Date     08/21/2021    K 3.8 08/21/2021     08/21/2021    CO2 29 08/21/2021    BUN 6 08/21/2021    CREATININE 0.8 08/21/2021    CREATININE 0.6 08/20/2021    CREATININE 0.7 08/19/2021    GFRAA >60 08/21/2021    LABGLOM >60 08/21/2021    GLUCOSE 116 08/21/2021    PROT 6.9 08/16/2021    LABALBU 3.3 08/16/2021    CALCIUM 8.6 08/21/2021    BILITOT 0.7 08/16/2021    ALKPHOS 110 08/16/2021    AST 13 08/16/2021    ALT <5 08/16/2021     Lab Results   Component Value Date    CRP 2.1 (H) 08/20/2021     Lab Results   Component Value Date    SEDRATE 35 (H) 08/20/2021     Radiology:  CT abd/pelvis   Impression:        1.  Fair large cecal neoplasm causing encasement of the central lumen of the   bowel in the cecal area, with the extension and encasement into the terminal   ileum and the proximal ascending colon. 2.  Presence of satellite mesenteric adenopathy adjacent the cecal area, most   likely metastatic. 3.  Some inflammatory reaction surrounds the pericecal spaces.  The could not   conspicuously identified the appendix.  The small well-contained air density   seen peripherally to the cecum which can represent remnant of an appendix   also involved by cecal mass or a small area of well contained localized   perforation (images: A2/34; A3/90 A1/121). .        Microbiology:   Blood cultures 8/16/2021: Anaerobic GPR in 2 of 4 bottles  Urine culture 8/20/2021: Negative so far  Rapid SARS-CoV-2: Not detected  8/20/2021 blood cultures: pending     ASSESSMENT:  · Fever.   Resolved   · Large cecal neoplasm  · Anaerobic gram-positive eladio bacteremia associated to above  · Hypokalemia  · Anemia  · History of recent CVA     PLAN:  · Continue Ertapenem  · Repeat blood cultures  · Monitor stools    April Haritha Harris, LYEDA - CNP  12:37 PM  8/21/2021     Patient seen and examined. I had a face to face encounter with the patient. Agree with exam.  Assessment and plan as outlined above and directed by me. Addition and corrections were done as deemed appropriate. My exam and plan include: Patient has no complaints. She is tolerating IV antibiotics. Surgery input appreciated.   Colonoscopy tomorrow    Kiah Avila MD  8/21/2021  1:01 PM

## 2021-08-22 ENCOUNTER — ANESTHESIA EVENT (OUTPATIENT)
Dept: OPERATING ROOM | Age: 66
DRG: 854 | End: 2021-08-22
Payer: MEDICARE

## 2021-08-22 ENCOUNTER — ANESTHESIA (OUTPATIENT)
Dept: OPERATING ROOM | Age: 66
DRG: 854 | End: 2021-08-22
Payer: MEDICARE

## 2021-08-22 ENCOUNTER — APPOINTMENT (OUTPATIENT)
Dept: CT IMAGING | Age: 66
DRG: 854 | End: 2021-08-22
Payer: MEDICARE

## 2021-08-22 VITALS — DIASTOLIC BLOOD PRESSURE: 69 MMHG | OXYGEN SATURATION: 99 % | SYSTOLIC BLOOD PRESSURE: 152 MMHG

## 2021-08-22 PROBLEM — C18.2 MALIGNANT NEOPLASM OF ASCENDING COLON (HCC): Status: ACTIVE | Noted: 2021-08-22

## 2021-08-22 LAB
ANION GAP SERPL CALCULATED.3IONS-SCNC: 4 MMOL/L (ref 7–16)
ANISOCYTOSIS: ABNORMAL
BASOPHILS ABSOLUTE: 0.09 E9/L (ref 0–0.2)
BASOPHILS RELATIVE PERCENT: 1.7 % (ref 0–2)
BUN BLDV-MCNC: 5 MG/DL (ref 6–23)
BURR CELLS: ABNORMAL
CALCIUM SERPL-MCNC: 8.7 MG/DL (ref 8.6–10.2)
CHLORIDE BLD-SCNC: 104 MMOL/L (ref 98–107)
CO2: 31 MMOL/L (ref 22–29)
CREAT SERPL-MCNC: 0.7 MG/DL (ref 0.5–1)
EOSINOPHILS ABSOLUTE: 0.23 E9/L (ref 0.05–0.5)
EOSINOPHILS RELATIVE PERCENT: 4.4 % (ref 0–6)
GFR AFRICAN AMERICAN: >60
GFR NON-AFRICAN AMERICAN: >60 ML/MIN/1.73
GLUCOSE BLD-MCNC: 92 MG/DL (ref 74–99)
HCT VFR BLD CALC: 29.5 % (ref 34–48)
HEMOGLOBIN: 8.4 G/DL (ref 11.5–15.5)
HYPOCHROMIA: ABNORMAL
LYMPHOCYTES ABSOLUTE: 0.94 E9/L (ref 1.5–4)
LYMPHOCYTES RELATIVE PERCENT: 18.4 % (ref 20–42)
MAGNESIUM: 2.3 MG/DL (ref 1.6–2.6)
MCH RBC QN AUTO: 20.8 PG (ref 26–35)
MCHC RBC AUTO-ENTMCNC: 28.5 % (ref 32–34.5)
MCV RBC AUTO: 73 FL (ref 80–99.9)
MONOCYTES ABSOLUTE: 0.1 E9/L (ref 0.1–0.95)
MONOCYTES RELATIVE PERCENT: 1.8 % (ref 2–12)
NEUTROPHILS ABSOLUTE: 3.85 E9/L (ref 1.8–7.3)
NEUTROPHILS RELATIVE PERCENT: 73.7 % (ref 43–80)
NUCLEATED RED BLOOD CELLS: 0 /100 WBC
OVALOCYTES: ABNORMAL
PDW BLD-RTO: 22.9 FL (ref 11.5–15)
PLATELET # BLD: 265 E9/L (ref 130–450)
PMV BLD AUTO: 8.9 FL (ref 7–12)
POIKILOCYTES: ABNORMAL
POLYCHROMASIA: ABNORMAL
POTASSIUM REFLEX MAGNESIUM: 3.5 MMOL/L (ref 3.5–5)
RBC # BLD: 4.04 E12/L (ref 3.5–5.5)
SCHISTOCYTES: ABNORMAL
SODIUM BLD-SCNC: 139 MMOL/L (ref 132–146)
TARGET CELLS: ABNORMAL
WBC # BLD: 5.2 E9/L (ref 4.5–11.5)

## 2021-08-22 PROCEDURE — 1200000000 HC SEMI PRIVATE

## 2021-08-22 PROCEDURE — 99232 SBSQ HOSP IP/OBS MODERATE 35: CPT | Performed by: INTERNAL MEDICINE

## 2021-08-22 PROCEDURE — 3700000000 HC ANESTHESIA ATTENDED CARE: Performed by: TRANSPLANT SURGERY

## 2021-08-22 PROCEDURE — 3600007501: Performed by: TRANSPLANT SURGERY

## 2021-08-22 PROCEDURE — 6360000002 HC RX W HCPCS: Performed by: NURSE ANESTHETIST, CERTIFIED REGISTERED

## 2021-08-22 PROCEDURE — 6370000000 HC RX 637 (ALT 250 FOR IP): Performed by: SURGERY

## 2021-08-22 PROCEDURE — 2709999900 HC NON-CHARGEABLE SUPPLY: Performed by: TRANSPLANT SURGERY

## 2021-08-22 PROCEDURE — 0DBH8ZX EXCISION OF CECUM, VIA NATURAL OR ARTIFICIAL OPENING ENDOSCOPIC, DIAGNOSTIC: ICD-10-PCS | Performed by: TRANSPLANT SURGERY

## 2021-08-22 PROCEDURE — 3E00XMZ INTRODUCTION OF PIGMENT INTO SKIN AND MUCOUS MEMBRANES, EXTERNAL APPROACH: ICD-10-PCS | Performed by: TRANSPLANT SURGERY

## 2021-08-22 PROCEDURE — 45380 COLONOSCOPY AND BIOPSY: CPT | Performed by: TRANSPLANT SURGERY

## 2021-08-22 PROCEDURE — 6370000000 HC RX 637 (ALT 250 FOR IP): Performed by: SPECIALIST

## 2021-08-22 PROCEDURE — 85025 COMPLETE CBC W/AUTO DIFF WBC: CPT

## 2021-08-22 PROCEDURE — 2500000003 HC RX 250 WO HCPCS: Performed by: NURSE ANESTHETIST, CERTIFIED REGISTERED

## 2021-08-22 PROCEDURE — 7100000010 HC PHASE II RECOVERY - FIRST 15 MIN: Performed by: TRANSPLANT SURGERY

## 2021-08-22 PROCEDURE — 3700000001 HC ADD 15 MINUTES (ANESTHESIA): Performed by: TRANSPLANT SURGERY

## 2021-08-22 PROCEDURE — 2580000003 HC RX 258: Performed by: NURSE ANESTHETIST, CERTIFIED REGISTERED

## 2021-08-22 PROCEDURE — 3600007511: Performed by: TRANSPLANT SURGERY

## 2021-08-22 PROCEDURE — 36415 COLL VENOUS BLD VENIPUNCTURE: CPT

## 2021-08-22 PROCEDURE — 71260 CT THORAX DX C+: CPT

## 2021-08-22 PROCEDURE — 2580000003 HC RX 258: Performed by: INTERNAL MEDICINE

## 2021-08-22 PROCEDURE — 83735 ASSAY OF MAGNESIUM: CPT

## 2021-08-22 PROCEDURE — 45381 COLONOSCOPY SUBMUCOUS NJX: CPT | Performed by: TRANSPLANT SURGERY

## 2021-08-22 PROCEDURE — 2580000003 HC RX 258: Performed by: TRANSPLANT SURGERY

## 2021-08-22 PROCEDURE — 6370000000 HC RX 637 (ALT 250 FOR IP): Performed by: TRANSPLANT SURGERY

## 2021-08-22 PROCEDURE — 7100000011 HC PHASE II RECOVERY - ADDTL 15 MIN: Performed by: TRANSPLANT SURGERY

## 2021-08-22 PROCEDURE — 80048 BASIC METABOLIC PNL TOTAL CA: CPT

## 2021-08-22 PROCEDURE — 6370000000 HC RX 637 (ALT 250 FOR IP): Performed by: INTERNAL MEDICINE

## 2021-08-22 PROCEDURE — 88305 TISSUE EXAM BY PATHOLOGIST: CPT

## 2021-08-22 PROCEDURE — 6360000004 HC RX CONTRAST MEDICATION: Performed by: RADIOLOGY

## 2021-08-22 RX ORDER — NEOMYCIN SULFATE 500 MG/1
1000 TABLET ORAL ONCE
Status: COMPLETED | OUTPATIENT
Start: 2021-08-22 | End: 2021-08-22

## 2021-08-22 RX ORDER — DIPHENHYDRAMINE HYDROCHLORIDE 50 MG/ML
12.5 INJECTION INTRAMUSCULAR; INTRAVENOUS
Status: DISCONTINUED | OUTPATIENT
Start: 2021-08-22 | End: 2021-08-22 | Stop reason: HOSPADM

## 2021-08-22 RX ORDER — ERYTHROMYCIN 250 MG/1
1000 TABLET, COATED ORAL ONCE
Status: COMPLETED | OUTPATIENT
Start: 2021-08-22 | End: 2021-08-22

## 2021-08-22 RX ORDER — PROPOFOL 10 MG/ML
INJECTION, EMULSION INTRAVENOUS PRN
Status: DISCONTINUED | OUTPATIENT
Start: 2021-08-22 | End: 2021-08-22 | Stop reason: SDUPTHER

## 2021-08-22 RX ORDER — AMOXICILLIN AND CLAVULANATE POTASSIUM 875; 125 MG/1; MG/1
1 TABLET, FILM COATED ORAL EVERY 12 HOURS
Status: DISCONTINUED | OUTPATIENT
Start: 2021-08-22 | End: 2021-08-26

## 2021-08-22 RX ORDER — MEPERIDINE HYDROCHLORIDE 25 MG/ML
12.5 INJECTION INTRAMUSCULAR; INTRAVENOUS; SUBCUTANEOUS EVERY 5 MIN PRN
Status: DISCONTINUED | OUTPATIENT
Start: 2021-08-22 | End: 2021-08-22 | Stop reason: HOSPADM

## 2021-08-22 RX ORDER — LIDOCAINE HYDROCHLORIDE 20 MG/ML
INJECTION, SOLUTION EPIDURAL; INFILTRATION; INTRACAUDAL; PERINEURAL PRN
Status: DISCONTINUED | OUTPATIENT
Start: 2021-08-22 | End: 2021-08-22 | Stop reason: SDUPTHER

## 2021-08-22 RX ORDER — HYDROCODONE BITARTRATE AND ACETAMINOPHEN 5; 325 MG/1; MG/1
1 TABLET ORAL
Status: DISCONTINUED | OUTPATIENT
Start: 2021-08-22 | End: 2021-08-22 | Stop reason: HOSPADM

## 2021-08-22 RX ORDER — PROCHLORPERAZINE EDISYLATE 5 MG/ML
5 INJECTION INTRAMUSCULAR; INTRAVENOUS
Status: DISCONTINUED | OUTPATIENT
Start: 2021-08-22 | End: 2021-08-22 | Stop reason: HOSPADM

## 2021-08-22 RX ORDER — SODIUM CHLORIDE, SODIUM LACTATE, POTASSIUM CHLORIDE, CALCIUM CHLORIDE 600; 310; 30; 20 MG/100ML; MG/100ML; MG/100ML; MG/100ML
INJECTION, SOLUTION INTRAVENOUS CONTINUOUS PRN
Status: DISCONTINUED | OUTPATIENT
Start: 2021-08-22 | End: 2021-08-22 | Stop reason: SDUPTHER

## 2021-08-22 RX ORDER — FENTANYL CITRATE 50 UG/ML
25 INJECTION, SOLUTION INTRAMUSCULAR; INTRAVENOUS EVERY 5 MIN PRN
Status: DISCONTINUED | OUTPATIENT
Start: 2021-08-22 | End: 2021-08-22 | Stop reason: HOSPADM

## 2021-08-22 RX ADMIN — SODIUM CHLORIDE, POTASSIUM CHLORIDE, SODIUM LACTATE AND CALCIUM CHLORIDE: 600; 310; 30; 20 INJECTION, SOLUTION INTRAVENOUS at 07:00

## 2021-08-22 RX ADMIN — AMOXICILLIN AND CLAVULANATE POTASSIUM 1 TABLET: 875; 125 TABLET, FILM COATED ORAL at 14:33

## 2021-08-22 RX ADMIN — IOPAMIDOL 75 ML: 755 INJECTION, SOLUTION INTRAVENOUS at 11:27

## 2021-08-22 RX ADMIN — PROPOFOL 30 MG: 10 INJECTION, EMULSION INTRAVENOUS at 08:04

## 2021-08-22 RX ADMIN — PROPOFOL 20 MG: 10 INJECTION, EMULSION INTRAVENOUS at 07:55

## 2021-08-22 RX ADMIN — ERYTHROMYCIN 1000 MG: 250 TABLET, FILM COATED ORAL at 17:46

## 2021-08-22 RX ADMIN — PROPOFOL 80 MG: 10 INJECTION, EMULSION INTRAVENOUS at 07:44

## 2021-08-22 RX ADMIN — Medication 10 ML: at 20:59

## 2021-08-22 RX ADMIN — FERROUS SULFATE TAB 325 MG (65 MG ELEMENTAL FE) 325 MG: 325 (65 FE) TAB at 09:01

## 2021-08-22 RX ADMIN — NEOMYCIN SULFATE 1000 MG: 500 TABLET ORAL at 16:27

## 2021-08-22 RX ADMIN — ERYTHROMYCIN 1000 MG: 250 TABLET, FILM COATED ORAL at 22:43

## 2021-08-22 RX ADMIN — LIDOCAINE HYDROCHLORIDE 40 MG: 20 INJECTION, SOLUTION EPIDURAL; INFILTRATION; INTRACAUDAL; PERINEURAL at 07:44

## 2021-08-22 RX ADMIN — NEOMYCIN SULFATE 1000 MG: 500 TABLET ORAL at 17:46

## 2021-08-22 RX ADMIN — ATORVASTATIN CALCIUM 40 MG: 40 TABLET, FILM COATED ORAL at 20:59

## 2021-08-22 RX ADMIN — Medication 10 ML: at 09:01

## 2021-08-22 RX ADMIN — PROPOFOL 20 MG: 10 INJECTION, EMULSION INTRAVENOUS at 07:49

## 2021-08-22 RX ADMIN — NEOMYCIN SULFATE 1000 MG: 500 TABLET ORAL at 22:43

## 2021-08-22 RX ADMIN — ERYTHROMYCIN 1000 MG: 250 TABLET, FILM COATED ORAL at 16:27

## 2021-08-22 RX ADMIN — FERROUS SULFATE TAB 325 MG (65 MG ELEMENTAL FE) 325 MG: 325 (65 FE) TAB at 17:46

## 2021-08-22 ASSESSMENT — PULMONARY FUNCTION TESTS
PIF_VALUE: 0

## 2021-08-22 ASSESSMENT — LIFESTYLE VARIABLES: SMOKING_STATUS: 1

## 2021-08-22 ASSESSMENT — PAIN SCALES - GENERAL
PAINLEVEL_OUTOF10: 0

## 2021-08-22 ASSESSMENT — ENCOUNTER SYMPTOMS
DYSPNEA ACTIVITY LEVEL: NO INTERVAL CHANGE
DYSPNEA ACTIVITY LEVEL: NO INTERVAL CHANGE

## 2021-08-22 NOTE — ANESTHESIA PRE PROCEDURE
Department of Anesthesiology  Preprocedure Note       Name:  Christin Cobb   Age:  73 y.o.  :  1955                                          MRN:  [id number]         Date:  2021      Surgeon: Ana Cristina Darling):  Jennifer Low MD    Procedure: Procedure(s):  OPEN RIGHT COLON RESECTION    Medications prior to admission:   Prior to Admission medications    Medication Sig Start Date End Date Taking? Authorizing Provider   Incontinence Supply Disposable MISC Large size 21   Agueda Sampson, DO   atorvastatin (LIPITOR) 40 MG tablet Take 1 tablet by mouth nightly 21, DO   clopidogrel (PLAVIX) 75 MG tablet Take 1 tablet by mouth daily 21, DO   lisinopril (PRINIVIL;ZESTRIL) 5 MG tablet Take 1 tablet by mouth daily 21, DO   Blood Pressure KIT 1 each by Does not apply route daily 5/10/21   Agueda Sampson, DO       Current medications:    No current facility-administered medications for this visit. No current outpatient medications on file.      Facility-Administered Medications Ordered in Other Visits   Medication Dose Route Frequency Provider Last Rate Last Admin    amoxicillin-clavulanate (AUGMENTIN) 875-125 MG per tablet 1 tablet  1 tablet Oral Q12H Bhargavi Guerra MD        atorvastatin (LIPITOR) tablet 40 mg  40 mg Oral Nightly Jennifer Ortiz MD   40 mg at 21 2230    sodium chloride flush 0.9 % injection 10 mL  10 mL Intravenous 2 times per day Polo Nguyễn MD   10 mL at 21 0901    sodium chloride flush 0.9 % injection 10 mL  10 mL Intravenous PRN Jennifer Ortiz MD        0.9 % sodium chloride infusion  25 mL Intravenous PRN Jennifer Ortiz MD        polyethylene glycol (GLYCOLAX) packet 17 g  17 g Oral Daily PRN Jennifer Ortiz MD   17 g at 21 1547    acetaminophen (TYLENOL) tablet 650 mg  650 mg Oral Q6H PRN Jennifer Ortiz MD        Or    acetaminophen (TYLENOL) suppository 650 mg  650 mg Rectal Q6H PRN Karmen Meneses MD        ferrous sulfate (IRON 325) tablet 325 mg  325 mg Oral BID WC Jennifer Ortiz MD   325 mg at 08/22/21 0901       Allergies:  No Known Allergies    Problem List:    Patient Active Problem List   Diagnosis Code    Tobacco abuse Z72.0    Hyperlipidemia E78.5    Stroke-like symptoms R29.90    Encephalopathy acute G93.40    Cerebrovascular accident (CVA) due to embolism of right middle cerebral artery (HCC) I63.411    Acute deep vein thrombosis (DVT) of calf muscle vein of left lower extremity (HCC) I82.462    PFO (patent foramen ovale) Q21.1    UTI (urinary tract infection) N39.0    Malignant neoplasm of ascending colon (HCC) C18.2       Past Medical History:        Diagnosis Date    Acute gastroenteritis 03/2010    Cerebral artery occlusion with cerebral infarction ([facility] [address]     Chronic back pain     Headache(784.0)     Hyperlipidemia 03/2010    Hypertension     Obesity     Osteoarthritis     Tobacco abuse        Past Surgical History:        Procedure Laterality Date    TUBAL LIGATION  1983       Social History:    Social History     Tobacco Use    Smoking status: Current Every Day Smoker     Packs/day: 1.00     Years: 48.00     Pack years: 48.00     Types: Cigarettes    Smokeless tobacco: Never Used    Tobacco comment: Wants to quit on her own first   Substance Use Topics    Alcohol use: No                                Ready to quit: Not Answered  Counseling given: Not Answered  Comment: Wants to quit on her own first      Vital Signs (Current): There were no vitals filed for this visit.                                            BP Readings from Last 3 Encounters:   08/22/21 (!) 169/71   07/14/21 128/62   05/27/21 134/66       NPO Status:  GREATER THAN 8 HOURS                                                                               BMI:   Wt Readings from Last 3 Encounters:   08/21/21 162 lb 0.6 oz (73.5 kg)   07/21/21 169 lb (76.7 kg) 07/14/21 169 lb 12.8 oz (77 kg)     There is no height or weight on file to calculate BMI.    CBC:   Lab Results   Component Value Date    WBC 5.2 08/22/2021    RBC 4.04 08/22/2021    HGB 8.4 08/22/2021    HCT 29.5 08/22/2021    MCV 73.0 08/22/2021    RDW 22.9 08/22/2021     08/22/2021       CMP:   Lab Results   Component Value Date     08/22/2021    K 3.5 08/22/2021     08/22/2021    CO2 31 08/22/2021    BUN 5 08/22/2021    CREATININE 0.7 08/22/2021    GFRAA >60 08/22/2021    LABGLOM >60 08/22/2021    GLUCOSE 92 08/22/2021    PROT 6.9 08/16/2021    CALCIUM 8.7 08/22/2021    BILITOT 0.7 08/16/2021    ALKPHOS 110 08/16/2021    AST 13 08/16/2021    ALT <5 08/16/2021       POC Tests: No results for input(s): POCGLU, POCNA, POCK, POCCL, POCBUN, POCHEMO, POCHCT in the last 72 hours.     Coags:   Lab Results   Component Value Date    PROTIME 14.5 08/21/2021    INR 1.3 08/21/2021    APTT 28.3 04/26/2021       HCG (If Applicable): No results found for: PREGTESTUR, PREGSERUM, HCG, HCGQUANT     ABGs: No results found for: PHART, PO2ART, HWD8TRD, KTJ4CMG, BEART, Q1WRWKKS     Type & Screen (If Applicable):  No results found for: LABABO, LABRH    Drug/Infectious Status (If Applicable):  No results found for: HIV, HEPCAB    COVID-19 Screening (If Applicable):   Lab Results   Component Value Date    COVID19 Not Detected 08/16/2021     Impression   1.  Fair large cecal neoplasm causing encasement of the central lumen of the   bowel in the cecal area, with the extension and encasement into the terminal   ileum and the proximal ascending colon.       2.  Presence of satellite mesenteric adenopathy adjacent the cecal area, most   likely metastatic.       3.  Some inflammatory reaction surrounds the pericecal spaces.  The could not   conspicuously identified the appendix.  The small well-contained air density   seen peripherally to the cecum which can represent remnant of an appendix   also involved by cecal mass or a small area of well contained localized   perforation (images: A2/34; A3/90 A1/121). .               Anesthesia Evaluation  Patient summary reviewed and Nursing notes reviewed no history of anesthetic complications:   Airway: Mallampati: III  TM distance: >3 FB   Neck ROM: limited  Mouth opening: > = 3 FB Dental:    (+) edentulous      Pulmonary:   (+) rhonchi,  decreased breath sounds,  current smoker (50 pyhx.)          Patient did not smoke on day of surgery. PE comment: Prolonged expiratory phase Cardiovascular:  Exercise tolerance: poor (<4 METS),   (+) hypertension: moderate, MEZA: no interval change, murmur (Grade II), hyperlipidemia      ECG reviewed  Rhythm: regular  Rate: normal  Echocardiogram reviewed         Beta Blocker:  Not on Beta Blocker      ROS comment: PFO    Sinus tachycardia  Possible Left atrial enlargement  Septal infarct , age undetermined  T wave abnormality, consider anterior ischemia  Abnormal ECG  No previous ECGs available    Summary  Ejection fraction is visually estimated at 60-65%. Atrial septum is aneurysmal.  Agitated saline injected for shunt evaluation. Small PFO noted with right to left shunting of bubbles. No significant valvular abnormalities. Neuro/Psych:   (+) CVA (Right middle cerebral artery): no interval change, headaches: migraine headaches,              ROS comment: Acute encephalopathy with mental status change    Ambulatory dysfunction GI/Hepatic/Renal:            ROS comment: UTI    Acute gastroenteritis    Per Oncology:  HPI:  Douglas Castorena a 73 y. o. female with hx of cerebral infarction (on Eliquis last dose 1200) who presented 8/17 following a fall and AMS. She was found to have a fever of 104 in ED and admitted for management of sepsis. CT-AP was completed to look for intraabdominal source of infection, which showed a large cecal mass with extension into and encasement of the terminal ileum.  Hb has been low throughout admission 6.2 on 8/18 requiring transfusion of prbc. .   Endo/Other:    (+) blood dyscrasia (8.2/28.5 on Eliquis (stopped) and Plavix LD 8/16 s/p PRBC transfusion x 1 on 8/18 ): anticoagulation therapy, arthritis: OA., . Pt had no PAT visit        ROS comment: Chronic back pain    Pt. On Plavix on home but prescribed Eliquis whilst admitted (on hold)    Sepsis and bacteremia Abdominal:             Vascular:   + DVT, . Other Findings:             Anesthesia Plan      general     ASA 4     (Note:  Diagnostic colonoscopy performed Sunday 8/22/21.)  Induction: intravenous. MIPS: Postoperative opioids intended and Prophylactic antiemetics administered. Anesthetic plan and risks discussed with patient. Plan discussed with CRNA. Jean Claude Chatman DO   8/22/2021    History, data, and pertinent studies from chart review. Above represents information available via the shared medical record including previous anesthetic, medication, and allergy history. Confirmation of above and final disposition per DOS anesthesiologist.    Jean Claude Chatman DO  Staff Anesthesiologist  August 22, 2021  1:24 PM     DOS STAFF ADDENDUM:    Patient seen and chart reviewed. No interval change in history or exam.   Anesthesia plan discussed, risk/benefits addressed. Patient's concerns and questions answered.      Imelda Hargrove DO  August 23, 2021  3:18 PM

## 2021-08-22 NOTE — ANESTHESIA POSTPROCEDURE EVALUATION
Department of Anesthesiology  Postprocedure Note    Patient: Maru Agudelo  MRN: [id number]  [place]: 1955  Date of evaluation: 8/22/2021  Time:  8:32 AM     Procedure Summary     Date: 08/22/21 Room / Location: [facility]    Anesthesia Start: 0348 Anesthesia Stop: 0818    Procedure: COLONOSCOPY (N/A Buttocks) Diagnosis: (COLONOSCOPY)    Surgeons: Dave Yadav MD Responsible Provider: Jason Correa DO    Anesthesia Type: MAC ASA Status: 4          Anesthesia Type: MAC    Filomena Phase I:      Filomena Phase II: Filomena Score: 10    Last vitals: Reviewed and per EMR flowsheets.        Anesthesia Post Evaluation    Patient location during evaluation: bedside  Patient participation: complete - patient participated  Level of consciousness: awake  Pain score: 1  Airway patency: patent  Nausea & Vomiting: no vomiting and no nausea  Complications: no  Cardiovascular status: hemodynamically stable  Respiratory status: acceptable  Hydration status: stable

## 2021-08-22 NOTE — PROGRESS NOTES
[address] [place] [address] [facility]  Progress Note    SUBJECTIVE:  Chief Complaint   Patient presents with    Fall     frequent falls.  Nausea     Patient is tolerating medications. No reported adverse drug reactions. No nausea, vomiting. No fevers overnight. Denies abdominal pain     Review of systems:  As stated above in the chief complaint, otherwise negative. Medications:  Scheduled Meds:   ertapenem (INVanz) IVPB  1,000 mg Intravenous Q24H    atorvastatin  40 mg Oral Nightly    sodium chloride flush  10 mL Intravenous 2 times per day    ferrous sulfate  325 mg Oral BID WC     Continuous Infusions:   sodium chloride       PRN Meds:sodium chloride flush, sodium chloride, polyethylene glycol, acetaminophen **OR** acetaminophen    OBJECTIVE:  BP (!) 169/71   Pulse 60   Temp 97.6 °F (36.4 °C) (Oral)   Resp 18   Ht 5' 3\" (1.6 m)   Wt 162 lb 0.6 oz (73.5 kg)   LMP 2011   SpO2 98%   BMI 28.70 kg/m²   Temp  Av.8 °F (36.6 °C)  Min: 97.6 °F (36.4 °C)  Max: 98 °F (36.7 °C)  Constitutional: The patient is awake, alert, and oriented. Lying in bed in NAD  Skin: Warm and dry. No rashes were noted. HEENT: Round and reactive pupils. Moist mucous membranes. No ulcerations or thrush. Neck: Supple to movements. Chest: No use of accessory muscles to breathe. Symmetrical expansion. No wheezing, crackles or rhonchi. Cardiovascular: S1 and S2 are rhythmic and regular. No murmurs appreciated. Abdomen: Positive bowel sounds to auscultation. Nontender, nondistended. No masses felt. Extremities: No edema.   Lines: peripheral    Laboratory and Tests Review:  Lab Results   Component Value Date    WBC 5.2 2021    WBC 5.9 2021    WBC 4.3 (L) 2021    HGB 8.4 (L) 2021    HCT 29.5 (L) 2021    MCV 73.0 (L) 2021     2021     Lab Results   Component Value Date    NEUTROABS 3.85 2021    NEUTROABS 4.31 2021    NEUTROABS 2.95 08/20/2021     No results found for: CRP  Lab Results   Component Value Date    ALT <5 08/16/2021    AST 13 08/16/2021    ALKPHOS 110 (H) 08/16/2021    BILITOT 0.7 08/16/2021     Lab Results   Component Value Date     08/22/2021    K 3.5 08/22/2021     08/22/2021    CO2 31 08/22/2021    BUN 5 08/22/2021    CREATININE 0.7 08/22/2021    CREATININE 0.8 08/21/2021    CREATININE 0.6 08/20/2021    GFRAA >60 08/22/2021    LABGLOM >60 08/22/2021    GLUCOSE 92 08/22/2021    PROT 6.9 08/16/2021    LABALBU 3.3 08/16/2021    CALCIUM 8.7 08/22/2021    BILITOT 0.7 08/16/2021    ALKPHOS 110 08/16/2021    AST 13 08/16/2021    ALT <5 08/16/2021     Lab Results   Component Value Date    CRP 2.1 (H) 08/20/2021     Lab Results   Component Value Date    SEDRATE 35 (H) 08/20/2021     Radiology:  CT abd/pelvis   Impression:        1.  Fair large cecal neoplasm causing encasement of the central lumen of the   bowel in the cecal area, with the extension and encasement into the terminal   ileum and the proximal ascending colon. 2.  Presence of satellite mesenteric adenopathy adjacent the cecal area, most   likely metastatic. 3.  Some inflammatory reaction surrounds the pericecal spaces.  The could not   conspicuously identified the appendix.  The small well-contained air density   seen peripherally to the cecum which can represent remnant of an appendix   also involved by cecal mass or a small area of well contained localized   perforation (images: A2/34; A3/90 A1/121). .        Microbiology:   Blood cultures 8/16/2021: Anaerobic GPR in 2 of 4 bottles  Urine culture 8/20/2021: Negative so far  Rapid SARS-CoV-2: Not detected  8/20/2021 blood cultures: pending     ASSESSMENT:  · Fever. Resolved   · Large cecal neoplasm.   Status post colonoscopy with biopsy and tattooing 8/22/2021  · Anaerobic gram-positive eladio bacteremia associated to cecal neoplasm  · Hypokalemia  · History of recent CVA     PLAN:  · Change Ertapenem to Augmentin for a few more days  · We will follow with you    Gretchen Fischer MD  11:59 AM  8/22/2021

## 2021-08-22 NOTE — PROGRESS NOTES
[facility]   Progress Note    Admitting Date and Time: 8/16/2021  5:28 PM  Admit Dx: UTI (urinary tract infection) [N39.0]  Generalized weakness [R53.1]  Ambulatory dysfunction [R26.2]  Urinary tract infection without hematuria, site unspecified [N39.0]    Subjective:    Patient was admitted with UTI (urinary tract infection) [N39.0]  Generalized weakness [R53.1]  Ambulatory dysfunction [R26.2]  Urinary tract infection without hematuria, site unspecified [N39.0]. Seems more engaged and lucid today. Knows surgery is planned for tomorrow for large colon mass. Asks if this is to be done laparoscopically. Has no pain, is tolerating diet, no fever, no complaints at all. ROS: denies fever, chills, cp, sob, n/v, HA unless stated above.      ertapenem (INVanz) IVPB  1,000 mg Intravenous Q24H    atorvastatin  40 mg Oral Nightly    sodium chloride flush  10 mL Intravenous 2 times per day    ferrous sulfate  325 mg Oral BID WC     sodium chloride flush, 10 mL, PRN  sodium chloride, 25 mL, PRN  polyethylene glycol, 17 g, Daily PRN  acetaminophen, 650 mg, Q6H PRN   Or  acetaminophen, 650 mg, Q6H PRN         Objective:    BP (!) 169/71   Pulse 60   Temp 97.6 °F (36.4 °C) (Oral)   Resp 18   Ht 5' 3\" (1.6 m)   Wt 162 lb 0.6 oz (73.5 kg)   LMP 02/14/2011   SpO2 98%   BMI 28.70 kg/m²   General Appearance: alert and oriented to person, place and time, in no acute distress   Skin: warm and dry, no rash or erythema  Head: normocephalic and atraumatic  Neck: supple and non-tender   Pulmonary/Chest: clear to auscultation bilaterally  Cardiovascular: normal rate, regular rhythm, normal S1 and S2, no murmurs  Abdomen: soft, non-tender, non-distended  Extremities: no edema      Recent Labs     08/20/21  0539 08/21/21  0425 08/22/21  0350    137 139   K 3.3* 3.8 3.5    103 104   CO2 25 29 31*   BUN 6 6 5*   CREATININE 0.6 0.8 0.7   GLUCOSE 128* 116* 92   CALCIUM 7.9* 8.6 8.7 Recent Labs     08/20/21  0539 08/21/21  0425 08/22/21  0350   WBC 4.3* 5.9 5.2   RBC 3.53 3.94 4.04   HGB 7.1* 8.2* 8.4*   HCT 25.2* 28.5* 29.5*   MCV 71.4* 72.3* 73.0*   MCH 20.1* 20.8* 20.8*   MCHC 28.2* 28.8* 28.5*   RDW 21.7* 22.3* 22.9*    265 265   MPV 9.1 9.1 8.9       Radiology:   US DUP LOWER EXTREMITIES BILATERAL VENOUS   Final Result   No evidence of DVT in either lower extremity. CT ABDOMEN PELVIS W IV CONTRAST Additional Contrast? Oral   Final Result   1. Fair large cecal neoplasm causing encasement of the central lumen of the   bowel in the cecal area, with the extension and encasement into the terminal   ileum and the proximal ascending colon. 2.  Presence of satellite mesenteric adenopathy adjacent the cecal area, most   likely metastatic. 3.  Some inflammatory reaction surrounds the pericecal spaces. The could not   conspicuously identified the appendix. The small well-contained air density   seen peripherally to the cecum which can represent remnant of an appendix   also involved by cecal mass or a small area of well contained localized   perforation (images: A2/34; A3/90 A1/121). .         CT HEAD WO CONTRAST   Final Result   No acute intracranial abnormality. Age-related loss of brain volume and   chronic periventricular and subcortical white matter ischemic changes. Multifocal chronic infarcts as described. Multiple chronic small basal ganglia and corona radiata lacunar infarcts. XR CHEST PORTABLE   Final Result   No acute cardiopulmonary abnormality. XR HIP BILATERAL W AP PELVIS (2 VIEWS)   Final Result   1. No acute abnormality. 2. Calcified fibroid uterus. CT CHEST W CONTRAST    (Results Pending)       Assessment:    Principal Problem:    Malignant neoplasm of ascending colon (HCC)  Active Problems:    UTI (urinary tract infection)  Resolved Problems:    * No resolved hospital problems.  *      Plan:    1.  Mental status changes  Due to sepsis - bacteremia, and large cecal tumor.     Also had right parietal stroke in May, symptoms seem to have been mostly mental status related and not motor.     2.  Sepsis/bateremia -   Thought initially secondary to UTI. Urine culture negative     2/2 blood cultures positive for gram variable rods - clostridium septicum  Antibiotics changed to ertapenem and CT abdomen shows large cecal mass likely a neoplasm with extension and encasement of the terminal ileum and proximal ascending colon.     3.  Anemia  Hgb 8.2 on admission, but had IVF and dropped to 6.2 - transfused one unit and now up to 7.2, transfused again and now hgb 8. 4.       No bleeding seen.  However iron level is very low.  Will give iv iron as well. Watch hgb daily. Likely bleeding chronically due to cecal mass.     Stopping eliquis.     4.  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 Patient should be on eliquis. Also on plavix and if embolic I don't see this as additive - will stop.     5.  History of DVT  Stopping eliquis.     Lower extremities now negative for DVT. Will watch, patient at very high risk for recurrence, but no immediate indication for IVC filter.     6.  Large cecal mass  Surgery consulted, colonoscopy now scheduled, staging CT chest pending.   Colonoscopy shows large mass that is almost totally obstructing. Plan is surgery tomorrow. CEA very high. In terms of medical optimization, she is optimized. No additional testing needed.     Electronically signed by Kennedy Dominguez MD on 8/22/2021 at 11:42 AM

## 2021-08-22 NOTE — OP NOTE
PROCEDURE NOTE    DATE OF PROCEDURE: 8/22/2021    SURGEON: Mahsa Mccauley MD    ASSISTANT: None    PREOPERATIVE DIAGNOSIS: Diagnostic colonoscopy for cecal mass    POSTOPERATIVE DIAGNOSIS: Same. Cecal and ascending colon cancer spanning 10cm, fair to poor prep, severe sigmoid diverticulosis    OPERATION: Total colonoscopy to the cecum with cold forcep biopsy and tattooing for total of 5mL,     ANESTHESIA: Local monitored anesthesia. ESTIMATED BLOOD LOSS (ml): less than 50     COMPLICATIONS: None    SPECIMENS:  Was Obtained:     HISTORY: The patient is a 73y.o. year old female with history of above preop diagnosis. I recommended colonoscopy with possible biopsy or polypectomy and I explained the risk, benefits, expected outcome, and alternatives to the procedure. Risks included but are not limited to bleeding, infection, respiratory distress, hypotension, and perforation of the colon. The patient understands and is in agreement. PROCEDURE: The patient was given IV conscious sedation per anesthesia. The patient was given supplemental oxygen by nasal cannula. The colonoscope was inserted per rectum and advanced under direct vision to the cecum without difficulty. The prep was fair so exam was suboptimal and a small mucosal lesion could have been missed. FINDINGS:  Cecum/Ascending colon: 10cm cecal cancer extending in the ascending colon. Multiple cold forcep biopsies were taken. The upper limit of the mass was tattoo'd with spot so Dr. Jordi Trammell can see the upper limit of the cancer. Transverse colon: normal    Descending/Sigmoid colon: severe sigmoid diverticulosis    Rectum/Anus: examined in normal and retroflexed positions and was normal    The colon was decompressed and the scope was removed. The withdraw time was approximately 12 minutes. The patient tolerated the procedure well. ASSESSMENT/PLAN:   1. OR tomorrow with Dr. Jordi Trammell  2.  Called patient's son and left a voicemail about the above findings  3. CT chest must be done prior to OR  4.  Clears today, NPO after midnight    Electronically signed by Ana Lilia Lima MD on 8/22/21 at 8:22 AM EDT

## 2021-08-23 ENCOUNTER — ANESTHESIA (OUTPATIENT)
Dept: OPERATING ROOM | Age: 66
DRG: 854 | End: 2021-08-23
Payer: MEDICARE

## 2021-08-23 VITALS — OXYGEN SATURATION: 100 % | SYSTOLIC BLOOD PRESSURE: 159 MMHG | DIASTOLIC BLOOD PRESSURE: 66 MMHG

## 2021-08-23 LAB
ACANTHOCYTES: ABNORMAL
ANION GAP SERPL CALCULATED.3IONS-SCNC: 8 MMOL/L (ref 7–16)
ANISOCYTOSIS: ABNORMAL
BASOPHILS ABSOLUTE: 0.04 E9/L (ref 0–0.2)
BASOPHILS RELATIVE PERCENT: 0.7 % (ref 0–2)
BLOOD CULTURE, ROUTINE: ABNORMAL
BUN BLDV-MCNC: 5 MG/DL (ref 6–23)
CALCIUM SERPL-MCNC: 8.3 MG/DL (ref 8.6–10.2)
CHLORIDE BLD-SCNC: 102 MMOL/L (ref 98–107)
CO2: 28 MMOL/L (ref 22–29)
CREAT SERPL-MCNC: 0.8 MG/DL (ref 0.5–1)
CULTURE, BLOOD 2: ABNORMAL
CULTURE, BLOOD 2: ABNORMAL
EOSINOPHILS ABSOLUTE: 0.26 E9/L (ref 0.05–0.5)
EOSINOPHILS RELATIVE PERCENT: 4.6 % (ref 0–6)
GFR AFRICAN AMERICAN: >60
GFR NON-AFRICAN AMERICAN: >60 ML/MIN/1.73
GLUCOSE BLD-MCNC: 94 MG/DL (ref 74–99)
HCT VFR BLD CALC: 28 % (ref 34–48)
HEMOGLOBIN: 7.8 G/DL (ref 11.5–15.5)
IMMATURE GRANULOCYTES #: 0.03 E9/L
IMMATURE GRANULOCYTES %: 0.5 % (ref 0–5)
INR BLD: 1.2
LYMPHOCYTES ABSOLUTE: 1.27 E9/L (ref 1.5–4)
LYMPHOCYTES RELATIVE PERCENT: 22.2 % (ref 20–42)
MCH RBC QN AUTO: 20.6 PG (ref 26–35)
MCHC RBC AUTO-ENTMCNC: 27.9 % (ref 32–34.5)
MCV RBC AUTO: 74.1 FL (ref 80–99.9)
MONOCYTES ABSOLUTE: 0.27 E9/L (ref 0.1–0.95)
MONOCYTES RELATIVE PERCENT: 4.7 % (ref 2–12)
NEUTROPHILS ABSOLUTE: 3.84 E9/L (ref 1.8–7.3)
NEUTROPHILS RELATIVE PERCENT: 67.3 % (ref 43–80)
ORGANISM: ABNORMAL
ORGANISM: ABNORMAL
OVALOCYTES: ABNORMAL
PDW BLD-RTO: 23.7 FL (ref 11.5–15)
PLATELET # BLD: 278 E9/L (ref 130–450)
PMV BLD AUTO: 9 FL (ref 7–12)
POIKILOCYTES: ABNORMAL
POLYCHROMASIA: ABNORMAL
POTASSIUM REFLEX MAGNESIUM: 3.6 MMOL/L (ref 3.5–5)
PROTHROMBIN TIME: 12.9 SEC (ref 9.3–12.4)
RBC # BLD: 3.78 E12/L (ref 3.5–5.5)
SCHISTOCYTES: ABNORMAL
SODIUM BLD-SCNC: 138 MMOL/L (ref 132–146)
TEAR DROP CELLS: ABNORMAL
WBC # BLD: 5.7 E9/L (ref 4.5–11.5)

## 2021-08-23 PROCEDURE — 36415 COLL VENOUS BLD VENIPUNCTURE: CPT

## 2021-08-23 PROCEDURE — 6360000002 HC RX W HCPCS: Performed by: SURGERY

## 2021-08-23 PROCEDURE — 0DUH07Z SUPPLEMENT CECUM WITH AUTOLOGOUS TISSUE SUBSTITUTE, OPEN APPROACH: ICD-10-PCS | Performed by: SURGERY

## 2021-08-23 PROCEDURE — 3700000000 HC ANESTHESIA ATTENDED CARE: Performed by: SURGERY

## 2021-08-23 PROCEDURE — 3600000004 HC SURGERY LEVEL 4 BASE: Performed by: SURGERY

## 2021-08-23 PROCEDURE — 6370000000 HC RX 637 (ALT 250 FOR IP): Performed by: TRANSPLANT SURGERY

## 2021-08-23 PROCEDURE — 3600000014 HC SURGERY LEVEL 4 ADDTL 15MIN: Performed by: SURGERY

## 2021-08-23 PROCEDURE — 7100000000 HC PACU RECOVERY - FIRST 15 MIN: Performed by: SURGERY

## 2021-08-23 PROCEDURE — 99233 SBSQ HOSP IP/OBS HIGH 50: CPT | Performed by: INTERNAL MEDICINE

## 2021-08-23 PROCEDURE — 0DTF0ZZ RESECTION OF RIGHT LARGE INTESTINE, OPEN APPROACH: ICD-10-PCS | Performed by: SURGERY

## 2021-08-23 PROCEDURE — 3700000001 HC ADD 15 MINUTES (ANESTHESIA): Performed by: SURGERY

## 2021-08-23 PROCEDURE — 87081 CULTURE SCREEN ONLY: CPT

## 2021-08-23 PROCEDURE — 85025 COMPLETE CBC W/AUTO DIFF WBC: CPT

## 2021-08-23 PROCEDURE — 1200000000 HC SEMI PRIVATE

## 2021-08-23 PROCEDURE — 49905 OMENTAL FLAP INTRA-ABDOM: CPT | Performed by: SURGERY

## 2021-08-23 PROCEDURE — 2500000003 HC RX 250 WO HCPCS: Performed by: SURGERY

## 2021-08-23 PROCEDURE — 6370000000 HC RX 637 (ALT 250 FOR IP): Performed by: SPECIALIST

## 2021-08-23 PROCEDURE — 2580000003 HC RX 258: Performed by: TRANSPLANT SURGERY

## 2021-08-23 PROCEDURE — 85610 PROTHROMBIN TIME: CPT

## 2021-08-23 PROCEDURE — 2580000003 HC RX 258: Performed by: NURSE ANESTHETIST, CERTIFIED REGISTERED

## 2021-08-23 PROCEDURE — 2580000003 HC RX 258: Performed by: STUDENT IN AN ORGANIZED HEALTH CARE EDUCATION/TRAINING PROGRAM

## 2021-08-23 PROCEDURE — 2709999900 HC NON-CHARGEABLE SUPPLY: Performed by: SURGERY

## 2021-08-23 PROCEDURE — 6360000002 HC RX W HCPCS: Performed by: STUDENT IN AN ORGANIZED HEALTH CARE EDUCATION/TRAINING PROGRAM

## 2021-08-23 PROCEDURE — 2500000003 HC RX 250 WO HCPCS: Performed by: NURSE ANESTHETIST, CERTIFIED REGISTERED

## 2021-08-23 PROCEDURE — 6360000002 HC RX W HCPCS: Performed by: NURSE ANESTHETIST, CERTIFIED REGISTERED

## 2021-08-23 PROCEDURE — 80048 BASIC METABOLIC PNL TOTAL CA: CPT

## 2021-08-23 PROCEDURE — 88309 TISSUE EXAM BY PATHOLOGIST: CPT

## 2021-08-23 PROCEDURE — 2720000010 HC SURG SUPPLY STERILE: Performed by: SURGERY

## 2021-08-23 PROCEDURE — 7100000001 HC PACU RECOVERY - ADDTL 15 MIN: Performed by: SURGERY

## 2021-08-23 PROCEDURE — 44160 REMOVAL OF COLON: CPT | Performed by: SURGERY

## 2021-08-23 RX ORDER — PROPOFOL 10 MG/ML
INJECTION, EMULSION INTRAVENOUS PRN
Status: DISCONTINUED | OUTPATIENT
Start: 2021-08-23 | End: 2021-08-23 | Stop reason: SDUPTHER

## 2021-08-23 RX ORDER — NALOXONE HYDROCHLORIDE 0.4 MG/ML
0.4 INJECTION, SOLUTION INTRAMUSCULAR; INTRAVENOUS; SUBCUTANEOUS PRN
Status: DISCONTINUED | OUTPATIENT
Start: 2021-08-23 | End: 2021-08-25

## 2021-08-23 RX ORDER — FENTANYL CITRATE 50 UG/ML
25 INJECTION, SOLUTION INTRAMUSCULAR; INTRAVENOUS EVERY 5 MIN PRN
Status: DISCONTINUED | OUTPATIENT
Start: 2021-08-23 | End: 2021-08-23 | Stop reason: HOSPADM

## 2021-08-23 RX ORDER — ONDANSETRON 2 MG/ML
INJECTION INTRAMUSCULAR; INTRAVENOUS PRN
Status: DISCONTINUED | OUTPATIENT
Start: 2021-08-23 | End: 2021-08-23 | Stop reason: SDUPTHER

## 2021-08-23 RX ORDER — ROCURONIUM BROMIDE 10 MG/ML
INJECTION, SOLUTION INTRAVENOUS PRN
Status: DISCONTINUED | OUTPATIENT
Start: 2021-08-23 | End: 2021-08-23 | Stop reason: SDUPTHER

## 2021-08-23 RX ORDER — NEOSTIGMINE METHYLSULFATE 1 MG/ML
INJECTION, SOLUTION INTRAVENOUS PRN
Status: DISCONTINUED | OUTPATIENT
Start: 2021-08-23 | End: 2021-08-23 | Stop reason: SDUPTHER

## 2021-08-23 RX ORDER — LIDOCAINE HYDROCHLORIDE 20 MG/ML
INJECTION, SOLUTION EPIDURAL; INFILTRATION; INTRACAUDAL; PERINEURAL PRN
Status: DISCONTINUED | OUTPATIENT
Start: 2021-08-23 | End: 2021-08-23 | Stop reason: SDUPTHER

## 2021-08-23 RX ORDER — FENTANYL CITRATE 50 UG/ML
INJECTION, SOLUTION INTRAMUSCULAR; INTRAVENOUS PRN
Status: DISCONTINUED | OUTPATIENT
Start: 2021-08-23 | End: 2021-08-23 | Stop reason: SDUPTHER

## 2021-08-23 RX ORDER — SODIUM CHLORIDE 9 MG/ML
INJECTION, SOLUTION INTRAVENOUS CONTINUOUS PRN
Status: DISCONTINUED | OUTPATIENT
Start: 2021-08-23 | End: 2021-08-23 | Stop reason: SDUPTHER

## 2021-08-23 RX ORDER — GLYCOPYRROLATE 1 MG/5 ML
SYRINGE (ML) INTRAVENOUS PRN
Status: DISCONTINUED | OUTPATIENT
Start: 2021-08-23 | End: 2021-08-23 | Stop reason: SDUPTHER

## 2021-08-23 RX ORDER — SODIUM CHLORIDE, SODIUM LACTATE, POTASSIUM CHLORIDE, CALCIUM CHLORIDE 600; 310; 30; 20 MG/100ML; MG/100ML; MG/100ML; MG/100ML
INJECTION, SOLUTION INTRAVENOUS CONTINUOUS
Status: DISCONTINUED | OUTPATIENT
Start: 2021-08-23 | End: 2021-08-28 | Stop reason: HOSPADM

## 2021-08-23 RX ORDER — DEXAMETHASONE SODIUM PHOSPHATE 4 MG/ML
INJECTION, SOLUTION INTRA-ARTICULAR; INTRALESIONAL; INTRAMUSCULAR; INTRAVENOUS; SOFT TISSUE PRN
Status: DISCONTINUED | OUTPATIENT
Start: 2021-08-23 | End: 2021-08-23 | Stop reason: SDUPTHER

## 2021-08-23 RX ADMIN — SODIUM CHLORIDE, POTASSIUM CHLORIDE, SODIUM LACTATE AND CALCIUM CHLORIDE: 600; 310; 30; 20 INJECTION, SOLUTION INTRAVENOUS at 20:13

## 2021-08-23 RX ADMIN — Medication: at 18:32

## 2021-08-23 RX ADMIN — Medication 0.6 MG: at 17:30

## 2021-08-23 RX ADMIN — ROCURONIUM BROMIDE 50 MG: 10 INJECTION, SOLUTION INTRAVENOUS at 16:10

## 2021-08-23 RX ADMIN — DEXAMETHASONE SODIUM PHOSPHATE 10 MG: 4 INJECTION, SOLUTION INTRAMUSCULAR; INTRAVENOUS at 16:27

## 2021-08-23 RX ADMIN — FERROUS SULFATE TAB 325 MG (65 MG ELEMENTAL FE) 325 MG: 325 (65 FE) TAB at 08:47

## 2021-08-23 RX ADMIN — FENTANYL CITRATE 100 MCG: 50 INJECTION, SOLUTION INTRAMUSCULAR; INTRAVENOUS at 16:10

## 2021-08-23 RX ADMIN — LIDOCAINE HYDROCHLORIDE 100 MG: 20 INJECTION, SOLUTION EPIDURAL; INFILTRATION; INTRACAUDAL; PERINEURAL at 16:10

## 2021-08-23 RX ADMIN — AMOXICILLIN AND CLAVULANATE POTASSIUM 1 TABLET: 875; 125 TABLET, FILM COATED ORAL at 02:00

## 2021-08-23 RX ADMIN — ONDANSETRON 4 MG: 2 INJECTION INTRAMUSCULAR; INTRAVENOUS at 16:27

## 2021-08-23 RX ADMIN — METRONIDAZOLE 500 MG: 500 INJECTION, SOLUTION INTRAVENOUS at 16:05

## 2021-08-23 RX ADMIN — PROPOFOL 100 MG: 10 INJECTION, EMULSION INTRAVENOUS at 16:10

## 2021-08-23 RX ADMIN — Medication 2000 MG: at 16:05

## 2021-08-23 RX ADMIN — Medication 10 ML: at 10:51

## 2021-08-23 RX ADMIN — Medication 3 MG: at 17:30

## 2021-08-23 RX ADMIN — SODIUM CHLORIDE: 9 INJECTION, SOLUTION INTRAVENOUS at 16:05

## 2021-08-23 ASSESSMENT — PULMONARY FUNCTION TESTS
PIF_VALUE: 7
PIF_VALUE: 3
PIF_VALUE: 30
PIF_VALUE: 14
PIF_VALUE: 2
PIF_VALUE: 18
PIF_VALUE: 14
PIF_VALUE: 2
PIF_VALUE: 17
PIF_VALUE: 18
PIF_VALUE: 25
PIF_VALUE: 10
PIF_VALUE: 10
PIF_VALUE: 2
PIF_VALUE: 16
PIF_VALUE: 16
PIF_VALUE: 18
PIF_VALUE: 17
PIF_VALUE: 0
PIF_VALUE: 1
PIF_VALUE: 17
PIF_VALUE: 14
PIF_VALUE: 17
PIF_VALUE: 18
PIF_VALUE: 5
PIF_VALUE: 7
PIF_VALUE: 0
PIF_VALUE: 16
PIF_VALUE: 18
PIF_VALUE: 18
PIF_VALUE: 17
PIF_VALUE: 7
PIF_VALUE: 10
PIF_VALUE: 2
PIF_VALUE: 3
PIF_VALUE: 7
PIF_VALUE: 18
PIF_VALUE: 2
PIF_VALUE: 18
PIF_VALUE: 18
PIF_VALUE: 17
PIF_VALUE: 18
PIF_VALUE: 14
PIF_VALUE: 18
PIF_VALUE: 1
PIF_VALUE: 0
PIF_VALUE: 18
PIF_VALUE: 18
PIF_VALUE: 20
PIF_VALUE: 17
PIF_VALUE: 10
PIF_VALUE: 17
PIF_VALUE: 17
PIF_VALUE: 14
PIF_VALUE: 17
PIF_VALUE: 3
PIF_VALUE: 2
PIF_VALUE: 7
PIF_VALUE: 19
PIF_VALUE: 14
PIF_VALUE: 18
PIF_VALUE: 11
PIF_VALUE: 2
PIF_VALUE: 18
PIF_VALUE: 2
PIF_VALUE: 14
PIF_VALUE: 18
PIF_VALUE: 3
PIF_VALUE: 10
PIF_VALUE: 1
PIF_VALUE: 2
PIF_VALUE: 14
PIF_VALUE: 7
PIF_VALUE: 16
PIF_VALUE: 1
PIF_VALUE: 0
PIF_VALUE: 17
PIF_VALUE: 2
PIF_VALUE: 18
PIF_VALUE: 0
PIF_VALUE: 18
PIF_VALUE: 18
PIF_VALUE: 2
PIF_VALUE: 2
PIF_VALUE: 14
PIF_VALUE: 16
PIF_VALUE: 18
PIF_VALUE: 17
PIF_VALUE: 17
PIF_VALUE: 18
PIF_VALUE: 10
PIF_VALUE: 18
PIF_VALUE: 17
PIF_VALUE: 18
PIF_VALUE: 18
PIF_VALUE: 16
PIF_VALUE: 3
PIF_VALUE: 17
PIF_VALUE: 18
PIF_VALUE: 2
PIF_VALUE: 17
PIF_VALUE: 14
PIF_VALUE: 18

## 2021-08-23 ASSESSMENT — PAIN SCALES - GENERAL
PAINLEVEL_OUTOF10: 0
PAINLEVEL_OUTOF10: 0
PAINLEVEL_OUTOF10: 3
PAINLEVEL_OUTOF10: 0
PAINLEVEL_OUTOF10: 10

## 2021-08-23 ASSESSMENT — PAIN DESCRIPTION - PAIN TYPE: TYPE: SURGICAL PAIN

## 2021-08-23 ASSESSMENT — PAIN DESCRIPTION - LOCATION: LOCATION: ABDOMEN

## 2021-08-23 NOTE — ADT AUTH CERT
Utilization Reviews       Sepsis and Other Febrile Illness, without Focal Infection - Care Day 4 (8/20/2021) by Dawson Chase RN       Review Status Review Entered   Completed 8/23/2021 16:25      Criteria Review      Care Day: 4 Care Date: 8/20/2021 Level of Care: Telemetry    Guideline Day 4    Level Of Care    (X) Floor to discharge    8/23/2021 4:25 PM EDT by Stewart Don      tele    Clinical Status    ( ) * Hemodynamic stability    (X) * Afebrile or temperature acceptable for next level of care    (X) * Hypoxemia absent    (X) * Tachypnea absent    (X) * Cultures negative or infection identified and under adequate treatment    8/23/2021 4:25 PM EDT by Stewart Don      blood cx.  no growth to date    (X) * Mental status at baseline    (X) * Metabolic derangement (eg, dehydration, acidosis) absent    8/23/2021 4:25 PM EDT by Stewart Don      bun 6  creatinine 0.6    (X) * End organ dysfunction (eg, myocardial ischemia, renal failure) absent    8/23/2021 4:25 PM EDT by Stewart Don      bun 6  creatinine 0.6    ( ) * Discharge plans and education understood    Activity    (X) * Ambulatory or acceptable for next level of care    8/23/2021 4:25 PM EDT by Stewart Don      up with assistance    Routes    (X) * Oral hydration    8/23/2021 4:25 PM EDT by Stewart Don      regular diet    (X) * Oral medications or regimen acceptable for next level of care    8/23/2021 4:25 PM EDT by Stewart Don      po and iv antibiotics    (X) * Oral diet or acceptable for next level of care    8/23/2021 4:25 PM EDT by Stewart Don      regular diet    Interventions    (X) * Isolation not indicated, or is performable at next level of care    8/23/2021 4:25 PM EDT by Stewart Don      no isolation    (X) WBC    8/23/2021 4:25 PM EDT by Stewart Don      4.3    Medications    (X) * Antimicrobial treatment not necessary or treatment at next level of care arranged    8/23/2021 4:25 PM EDT by Katelyn reno ivpb  flagyl ivpb    * Milestone   Additional Notes   8/20  CD 4      VS:   97.8   18    80   140/95      98% on Ra      Abn labs:   Potassium 3.3   Glucose 128   Calcium 7.9   WBC 4.3    Hemoglobin 7.1Low      Hematocrit 25.2Low      MCV 71.4Low      MCH 20.1Low      MCHC 28.2Low      RDW 21.7High    Lymphocytes absolute 0.86   Crp 2.1   Sed rate 35   Cea 92.3         Imaging:   Ct abd   1.  Fair large cecal neoplasm causing encasement of the central lumen of the   bowel in the cecal area, with the extension and encasement into the terminal   ileum and the proximal ascending colon.       2.  Presence of satellite mesenteric adenopathy adjacent the cecal area, most   likely metastatic.       3.  Some inflammatory reaction surrounds the pericecal spaces.  The could not   conspicuously identified the appendix.  The small well-contained air density   seen peripherally to the cecum which can represent remnant of an appendix   also involved by cecal mass or a small area of well contained localized   perforation (images: A2/34; A3/90 A1/121). .      US lower extrem bilat venous   No evidence of DVT in either lower extremity.       Floor orders:   Eliquis 5mg po bid   Lipitor 50mg po nightly   Ferrlecit 125mg ivpb daily   Ferrlecit 125mg ivpb daily   Ferrous sulfate 325mg po bid      IM PN:   /65   Pulse 85   Temp 97.8 °F (36.6 °C) (Oral)   Resp 16   Wt 170 lb (77.1 kg)   LMP 02/14/2011   SpO2 95%   BMI 30.11 kg/m²    General Appearance: alert and oriented to person, place and time, in no acute distress    Skin: warm and dry, no rash or erythema   Head: normocephalic and atraumatic   Neck: supple and non-tender    Pulmonary/Chest: clear to auscultation bilaterally   Cardiovascular: normal rate, regular rhythm, normal S1 and S2, no murmurs   Abdomen: soft, non-tender, non-distended   Extremities: no edema    Assessment:       Active Problems:     UTI (urinary tract infection)   Resolved Problems:     * No resolved hospital problems. *           Plan:       1.  Mental status changes   Due to sepsis - bacteremia.       Also had right parietal stroke in May, symptoms seem to have been mostly mental status related and not motor.       2.  Sepsis/bateremia -    Thought initially secondary to UTI. Urine culture negative       2/2 blood cultures positive for gram variable rods. Antibiotics changed yesterday to ertapenem and now CT abdomen done to see if there is an intra-abdominal source shows large cecal mass likely a neoplasm with extension and encasement of the terminal ileum and proximal ascending colon.       3.  Anemia   Hgb 8.2 on admission, but had IVF and dropped to 6.2 - transfused one unit and now up to 7.2 for 2 days.       No bleeding seen.  However iron level is very low.  Will give iv iron as well. Watch hgb daily.       Likely bleeding chronically due to cecal mass.       Stopping eliquis.       4.  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 Patient should be on eliquis. Also on plavix and if embolic I don't see this as additive. Given low hgb will stop plavix but eliquis necessary at this time.        However now with mass, and        5.  History of DVT   Stopping eliquis.       Will duplex lower extremities, may need IVC filter. Cause of DVT almost certainly colon cancer.       6.  Large cecal mass   Surgery consulted, colonoscopy now scheduled, staging CT done.        Infectious disease PN:   ASSESSMENT:   · Fever.  Resolved   · Anaerobic gram-positive eladio bacteremia associated to the above   · Hypokalemia   · Anemia   · History of recent CVA leukopenia       PLAN:   · Continue Ertapenem   · Repeat blood cultures   · CT of the abdomen and pelvis results pending   · Monitor stools      Gen surgery PN:   ASSESSMENT:   73 y.o. female with cecal mass on CTAP        PLAN:   -Colonoscopy 8/22 with Dr. Wil Stanley   - Prep with billie 8/21, clear liquid diet only 8/21 NPO @ midnight 8/22   -f/u Tumor marks- CEA    -INR    - Stop eliquis for now   - OR 8/23   - Chest CT w/ contrast 8/21            Sepsis and Other Febrile Illness, without Focal Infection - Care Day 3 (8/19/2021) by Raquel Bergman RN       Review Status Review Entered   Completed 8/23/2021 15:17      Criteria Review      Care Day: 3 Care Date: 8/19/2021 Level of Care: Telemetry    Guideline Day 3    Level Of Care    (X) Floor    8/23/2021 3:17 PM EDT by Janiya Newton      med/surg tele    Clinical Status    (X) * Mental status at baseline    (X) * Afebrile or fever improved    Activity    (X) Activity as tolerated    8/23/2021 3:17 PM EDT by Janiya Newton      up with assistance    Routes    (X) * Oral hydration    8/23/2021 3:17 PM EDT by Jason Majano regular, npo after midnight    (X) Diet as tolerated    8/23/2021 3:17 PM EDT by Jason Majano regular, npo after midnight    Interventions    (X) WBC    8/23/2021 3:17 PM EDT by Janiya Newton      3.4    Medications    (X) Possible antimicrobial treatment    8/23/2021 3:17 PM EDT by Janiya Newton      invanz, flagyl    (X) Possible DVT prophylaxis    8/23/2021 3:17 PM EDT by Janiya Newton      eliquis, lovenox, plavix,    * Milestone   Additional Notes   8/19 CD 3      VS:   97.7  66  18  136/63   96% on RA      Abn labs:   Glucose 113   Calcium 8.1   Wbc 3.4   Hgb 7.2   Hct 25.5   Mcv 71.2   Mch 20.1   Mchc 28.2   Rdw 21.5   Lymphocytes absolute 0.73      Floor orders:   Npo after midnight   Eliquis 5mg po bid   Lipitor 50mg po nightly   Plavix 75mg po daily   Invanz 1000mg ivpb q24hrs   Ferrlecit 125mg ivpb daily   Ferrous sulfate 325mg po bid      IM PN:   /63   Pulse 66   Temp 97.7 °F (36.5 °C) (Oral)   Resp 18   Wt 162 lb (73.5 kg)   LMP 02/14/2011   SpO2 96%   BMI 28.70 kg/m²    General Appearance: alert and oriented to person, place and time, in no acute distress    Skin: warm and dry, no rash or erythema   Head: normocephalic and atraumatic   Neck: supple and non-tender    Pulmonary/Chest: clear to auscultation bilaterally   Cardiovascular: normal rate, regular rhythm, normal S1 and S2, no murmurs   Abdomen: soft, non-tender, non-distended   Extremities: no edema   Neurologic: speech seems a little thick, slow to respond but awake, aware, alert, does have decent memory for past events.       Upper extremities 4+ to 5/5 strength, lower extremities 4/5 strength but may be poor effort as well. Assessment:       Active Problems:     UTI (urinary tract infection)   Resolved Problems:     * No resolved hospital problems. *           Plan:       1.  Mental status changes   Due to sepsis - bacteremia and UTI.       Also had right parietal stroke in May, symptoms seem to have been mostly mental status related and not motor.       2.  Sepsis/bateremia   Secondary to UTI. Urine culture not done until 8/17 and is pending.       2/2 blood cultures positive for gram variable rods. ID following. Patient on rocephin at this time. No fever.       3.  Anemia   Hgb 8.2 on admission, but had IVF and dropped to 6.2 - transfused one unit and now up to 7.2 for 2 days.       No bleeding seen.  However iron level is very low.  Will give iv iron as well. Watch hgb daily.       4.  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 Patient should be on eliquis. Also on plavix and if embolic I don't see this as additive. Given low hgb will stop plavix but eliquis necessary at this time.        5.  History of DVT   On eliquis as above.       Infectious Disease PN:   ASSESSMENT:   · Fever.  Resolved   · Anaerobic gram-positive eladio bacteremia associated to the above   · Hypokalemia   · Anemia   · History of recent CVA leukopenia       PLAN:   · Change Ceftriaxone to Ertapenem   · Repeat blood cultures   · CT of the abdomen and pelvis

## 2021-08-23 NOTE — PROGRESS NOTES
Consult for ostomy marking. Patient awake and verbal. Talking to brother on the phone  Aware of surgery today with possible bag. Bilateral upper quadrants marked. poor appetite with recent weight loss. States oldest son and wife will help at home  Concerns for nutrition and skin post op  Will follow  Qing Mancia.  Flaquita Benjamin, CNS, Wound Care

## 2021-08-23 NOTE — PROGRESS NOTES
NEUTROABS 4.31 08/21/2021     No results found for: CRPHS  Lab Results   Component Value Date    ALT <5 08/16/2021    AST 13 08/16/2021    ALKPHOS 110 (H) 08/16/2021    BILITOT 0.7 08/16/2021     Lab Results   Component Value Date     08/23/2021    K 3.6 08/23/2021     08/23/2021    CO2 28 08/23/2021    BUN 5 08/23/2021    CREATININE 0.8 08/23/2021    CREATININE 0.7 08/22/2021    CREATININE 0.8 08/21/2021    GFRAA >60 08/23/2021    LABGLOM >60 08/23/2021    GLUCOSE 94 08/23/2021    PROT 6.9 08/16/2021    LABALBU 3.3 08/16/2021    CALCIUM 8.3 08/23/2021    BILITOT 0.7 08/16/2021    ALKPHOS 110 08/16/2021    AST 13 08/16/2021    ALT <5 08/16/2021     Lab Results   Component Value Date    CRP 2.1 (H) 08/20/2021     Lab Results   Component Value Date    SEDRATE 35 (H) 08/20/2021     Radiology:  CT abd/pelvis   Impression:        1.  Fair large cecal neoplasm causing encasement of the central lumen of the   bowel in the cecal area, with the extension and encasement into the terminal   ileum and the proximal ascending colon. 2.  Presence of satellite mesenteric adenopathy adjacent the cecal area, most   likely metastatic. 3.  Some inflammatory reaction surrounds the pericecal spaces.  The could not   conspicuously identified the appendix.  The small well-contained air density   seen peripherally to the cecum which can represent remnant of an appendix   also involved by cecal mass or a small area of well contained localized   perforation (images: A2/34; A3/90 A1/121). .        Microbiology:   Blood cultures 8/16/2021: Clostridium septicum 2/4 bottles    Urine culture 8/20/2021: Negative so far  Rapid SARS-CoV-2: Not detected  8/20/2021 blood cultures: negative so far      ASSESSMENT:  · Fever. Resolved   · Large cecal neoplasm.   Status post colonoscopy with biopsy and tattooing 8/22/2021  · Anaerobic gram-positive eladio bacteremia associated to cecal neoplasm  · Hypokalemia  · History of recent CVA PLAN:  · Continue Augmentin for a few more days  · Surgery following - for open right colon resction today   · We will follow with you    LEYDA Gallagher CNP  12:11 PM  8/23/2021     Patient seen and examined. I had a face to face encounter with the patient. Agree with exam.  Assessment and plan as outlined above and directed by me. Addition and corrections were done as deemed appropriate. My exam and plan include: Patient is doing well. Tolerating current antibiotics. She is to have a hemicolectomy. Bisi Calvo MD  8/23/2021  4:53 PM

## 2021-08-23 NOTE — BRIEF OP NOTE
Brief Postoperative Note      Patient: Nohemi Wilson  YOB: 1955  MRN: [id number]    Date of Procedure: 8/23/2021    Pre-Op Diagnosis: Colonic Mass    Post-Op Diagnosis: Same       Procedure(s):  OPEN RIGHT HEMICOLECTOMY    Surgeon(s):  Zohreh Abarca MD    Assistant:  Resident: Carmelita Staton MD    Anesthesia: General    Estimated Blood Loss (mL): 50 mL    Complications: None    Specimens:   ID Type Source Tests Collected by Time Destination   A : RIGHT COLON AND SMALL BOWEL Tissue Tissue SURGICAL PATHOLOGY Zohreh Abarca MD 8/23/2021 1707        Implants:  * No implants in log *      Drains:   Urethral Catheter (Active)   Catheter Indications Need for fluid volume management of the critically ill patient in a critical care setting 08/17/21 0802   Site Assessment No urethral drainage 08/17/21 0802   Urine Color Yellow 08/20/21 2300   Urine Appearance Clear 08/20/21 2300   Urine Odor Malodorous 08/20/21 2300   Output (mL) 550 mL 08/19/21 0912       Urethral Catheter 16 fr (Active)       Findings: large pedunculated near obstructing cecal mass, s/p right hemicolectomy     Electronically signed by Carmelita Staton MD on 8/23/2021 at 6:09 PM

## 2021-08-23 NOTE — PROGRESS NOTES
GENERAL SURGERY  DAILY PROGRESS NOTE  8/23/2021    Subjective: This morning patient is anxious and states that she would like to go [facility] for her surgery. She says that she will talk to her son to further discuss this. No further complaints today. ROS negative except as stated above. Objective:  /72   Pulse 75   Temp 98 °F (36.7 °C) (Oral)   Resp 20   Ht 5' 3\" (1.6 m)   Wt 158 lb 4.8 oz (71.8 kg)   LMP 02/14/2011   SpO2 98%   BMI 28.04 kg/m²     GENERAL:  No acute distress. Alert and interactive. Oriented x3. LUNGS:  No cough. Nonlabored breathing on room air. CARDIOVASC:  Normal rate, no cyanosis. ABDOMEN:  Soft, non-distended, non-tender. No guarding / rigidity / rebound. EXTREMITIES: Moves all extremities. No swelling or edema. Assessment/Plan:  73 y.o. female with cecal mass on CTAP    - Discuss plans for surgery with her son   - Is scheduled for today    Plan discussed with Dr. Cee Stanton. Electronically signed by Steven Jackson MD on 8/23/2021 at 7:55 AM       Attending Physician Statement:    Chief Complaint:   Chief Complaint   Patient presents with    Fall     frequent falls.  Nausea       I have examined the patient and performed the key aspects of physical exam, reviewed the record (including all pertinent and new radiology images and laboratory findings), and discussed the case with the surgical team.  I agree with the assessment and plan with the following additions, corrections, and changes. 14pt review of symptoms completed and negative except as mentioned. Case discussed at length with patient and her son. They are agreeable to proceed today. Melissa Handy MD  08/23/21  11:44 AM    NOTE: This report, in part or full, may have been transcribed using voice recognition software. Every effort was made to ensure accuracy; however, inadvertent computerized transcription errors may be present.  Please excuse any transcriptional grammatical or spelling errors that may have escaped my editorial review.

## 2021-08-23 NOTE — CARE COORDINATION
Recent c-scope reveals cecal mass; scheduled for OR today. Pt to d/w son/ ; [facility] living following for Steffi 79 ; orders on chart. Course TBD-Inessa Arguelles RN,CM.

## 2021-08-23 NOTE — PROGRESS NOTES
[facility] Hospitalist   Progress Note    Admitting Date and Time: 8/16/2021  5:28 PM  Admit Dx: UTI (urinary tract infection) [N39.0]  Generalized weakness [R53.1]  Ambulatory dysfunction [R26.2]  Urinary tract infection without hematuria, site unspecified [N39.0]    Subjective:    Patient was admitted with UTI (urinary tract infection) [N39.0]  Generalized weakness [R53.1]  Ambulatory dysfunction [R26.2]  Urinary tract infection without hematuria, site unspecified [N39.0]. Patient awake and more alert. Knows she has surgery today and knows why. Spoke to son Penny Melvin on the phone. No real complaints otherwise. ROS: denies fever, chills, cp, sob, n/v, HA unless stated above.      amoxicillin-clavulanate  1 tablet Oral Q12H    atorvastatin  40 mg Oral Nightly    sodium chloride flush  10 mL Intravenous 2 times per day    ferrous sulfate  325 mg Oral BID WC     sodium chloride flush, 10 mL, PRN  sodium chloride, 25 mL, PRN  polyethylene glycol, 17 g, Daily PRN  acetaminophen, 650 mg, Q6H PRN   Or  acetaminophen, 650 mg, Q6H PRN         Objective:    BP (!) 151/69   Pulse 74   Temp 97.6 °F (36.4 °C) (Oral)   Resp 16   Ht 5' 3\" (1.6 m)   Wt 158 lb 4.8 oz (71.8 kg)   LMP 02/14/2011   SpO2 97%   BMI 28.04 kg/m²   General Appearance: alert and oriented to person, place and time, in no acute distress   Skin: warm and dry, no rash or erythema  Head: normocephalic and atraumatic  Neck: supple and non-tender   Pulmonary/Chest: clear to auscultation bilaterally  Cardiovascular: normal rate, regular rhythm, normal S1 and S2, no murmurs  Abdomen: soft, non-tender, non-distended  Extremities: no edema      Recent Labs     08/21/21  0425 08/22/21  0350 08/23/21  0425    139 138   K 3.8 3.5 3.6    104 102   CO2 29 31* 28   BUN 6 5* 5*   CREATININE 0.8 0.7 0.8   GLUCOSE 116* 92 94   CALCIUM 8.6 8.7 8.3*       Recent Labs     08/21/21  0425 08/22/21  0350 08/23/21  0425   WBC 5.9 5.2 5.7 RBC 3.94 4.04 3.78   HGB 8.2* 8.4* 7.8*   HCT 28.5* 29.5* 28.0*   MCV 72.3* 73.0* 74.1*   MCH 20.8* 20.8* 20.6*   MCHC 28.8* 28.5* 27.9*   RDW 22.3* 22.9* 23.7*    265 278   MPV 9.1 8.9 9.0       Radiology:   CT CHEST W CONTRAST   Final Result   Cardiomegaly with coronary artery calcification and moderate pericardial   effusion. COPD with pleural based nodular density in the right lower lobe probably   scarring. Malignancy is less likely. 3-5 mm additional nodules are noted in   the right lower lobe. Surveillance according to Fleischner society   guidelines are recommended. RECOMMENDATIONS:   Nodular density in the right lung base. The surveillance according to   Fleischner society guidelines is recommended         US DUP LOWER EXTREMITIES BILATERAL VENOUS   Final Result   No evidence of DVT in either lower extremity. CT ABDOMEN PELVIS W IV CONTRAST Additional Contrast? Oral   Final Result   1. Fair large cecal neoplasm causing encasement of the central lumen of the   bowel in the cecal area, with the extension and encasement into the terminal   ileum and the proximal ascending colon. 2.  Presence of satellite mesenteric adenopathy adjacent the cecal area, most   likely metastatic. 3.  Some inflammatory reaction surrounds the pericecal spaces. The could not   conspicuously identified the appendix. The small well-contained air density   seen peripherally to the cecum which can represent remnant of an appendix   also involved by cecal mass or a small area of well contained localized   perforation (images: A2/34; A3/90 A1/121). .         CT HEAD WO CONTRAST   Final Result   No acute intracranial abnormality. Age-related loss of brain volume and   chronic periventricular and subcortical white matter ischemic changes. Multifocal chronic infarcts as described. Multiple chronic small basal ganglia and corona radiata lacunar infarcts.          XR CHEST PORTABLE   Final standpoint.     6.  Large cecal mass  Surgery consulted, colonoscopy now scheduled, staging CT chest pending.   Colonoscopy shows large mass that is almost totally obstructing. Plan is surgery later today. Discussed briefly with surgery today. CEA very high.     In terms of medical optimization, she is optimized. No additional testing needed.     Electronically signed by Dedra Georges MD on 8/23/2021 at 9:18 AM

## 2021-08-23 NOTE — PROGRESS NOTES
Physician Progress Note      Fabrice Rojas  [facility] #:                  [id number]  :                       1955  ADMIT DATE:       2021 5:28 PM  [address] [place] DATE:  RESPONDING  PROVIDER #:        Frida Ortiz TEXT:    Patient admitted with weakness. Noted documentation of sepsis/UTI in H&P and   PN's. In order to support the diagnosis of sepsis, please include additional   clinical indicators in your documentation. Or please document if the   diagnosis of sepsis has been ruled out after further study    The medical record reflects the following:  Risk Factors: fall, old CVA  Clinical Indicators: Per H&P \"Acute cystitis with sepsis. \"  PN   \"Sepsis/bacteremia -Thought initially secondary to UTI. Urine culture negative. ..CT abdomen done to see if there is an intra-abdominal   source shows large cecal mass likely a neoplasm with extension and encasement   of the terminal ileum and proximal ascending colon. \"  Treatment: surgical intervention for cecal malignancy    Thank you,  Jeremiah Taylor RN, CCDS  Clinical Documentation Improvement Specialist  [email or web address]  [phone]  Options provided:  -- Sepsis and UTI were ruled out after study  -- Sepsis and UTI present as evidenced by, Please document evidence. -- Other - I will add my own diagnosis  -- Disagree - Not applicable / Not valid  -- Disagree - Clinically unable to determine / Unknown  -- Refer to Clinical Documentation Reviewer    PROVIDER RESPONSE TEXT:    Paatient does have sepsis and bacteremia, but it is not from UTI.     Query created by: Yadira Wilks on 2021 1:29 PM      Electronically signed by:  Jamie Richards 2021 1:57 PM

## 2021-08-24 PROCEDURE — 99233 SBSQ HOSP IP/OBS HIGH 50: CPT | Performed by: INTERNAL MEDICINE

## 2021-08-24 PROCEDURE — 2700000000 HC OXYGEN THERAPY PER DAY

## 2021-08-24 PROCEDURE — 1200000000 HC SEMI PRIVATE

## 2021-08-24 PROCEDURE — 6370000000 HC RX 637 (ALT 250 FOR IP): Performed by: STUDENT IN AN ORGANIZED HEALTH CARE EDUCATION/TRAINING PROGRAM

## 2021-08-24 PROCEDURE — 97164 PT RE-EVAL EST PLAN CARE: CPT

## 2021-08-24 PROCEDURE — 6360000002 HC RX W HCPCS: Performed by: STUDENT IN AN ORGANIZED HEALTH CARE EDUCATION/TRAINING PROGRAM

## 2021-08-24 PROCEDURE — 2580000003 HC RX 258: Performed by: STUDENT IN AN ORGANIZED HEALTH CARE EDUCATION/TRAINING PROGRAM

## 2021-08-24 PROCEDURE — 97168 OT RE-EVAL EST PLAN CARE: CPT

## 2021-08-24 RX ADMIN — AMOXICILLIN AND CLAVULANATE POTASSIUM 1 TABLET: 875; 125 TABLET, FILM COATED ORAL at 01:59

## 2021-08-24 RX ADMIN — FERROUS SULFATE TAB 325 MG (65 MG ELEMENTAL FE) 325 MG: 325 (65 FE) TAB at 17:00

## 2021-08-24 RX ADMIN — ENOXAPARIN SODIUM 40 MG: 40 INJECTION SUBCUTANEOUS at 09:18

## 2021-08-24 RX ADMIN — AMOXICILLIN AND CLAVULANATE POTASSIUM 1 TABLET: 875; 125 TABLET, FILM COATED ORAL at 13:04

## 2021-08-24 RX ADMIN — SODIUM CHLORIDE, POTASSIUM CHLORIDE, SODIUM LACTATE AND CALCIUM CHLORIDE: 600; 310; 30; 20 INJECTION, SOLUTION INTRAVENOUS at 05:57

## 2021-08-24 RX ADMIN — FERROUS SULFATE TAB 325 MG (65 MG ELEMENTAL FE) 325 MG: 325 (65 FE) TAB at 09:18

## 2021-08-24 RX ADMIN — ATORVASTATIN CALCIUM 40 MG: 40 TABLET, FILM COATED ORAL at 20:45

## 2021-08-24 ASSESSMENT — PAIN SCALES - GENERAL
PAINLEVEL_OUTOF10: 0
PAINLEVEL_OUTOF10: 5
PAINLEVEL_OUTOF10: 2

## 2021-08-24 ASSESSMENT — PAIN DESCRIPTION - LOCATION
LOCATION: ABDOMEN
LOCATION: ABDOMEN

## 2021-08-24 ASSESSMENT — PAIN DESCRIPTION - PAIN TYPE
TYPE: SURGICAL PAIN
TYPE: SURGICAL PAIN

## 2021-08-24 NOTE — OP NOTE
Operative Note      Patient: Gloria Finley  YOB: 1955  MRN: [id number]    Date of Procedure: 8/23/2021    Pre-Op Diagnosis: Colonic cecal mass    Post-Op Diagnosis: Same       Procedure(s):  OPEN RIGHT HEMICOLECTOMY WITH OMENTAL PEDICLE FLAP    Surgeon(s):  Carroll Klein MD    Assistant:   Resident: Jitendra Brady MD    Anesthesia: General    Estimated Blood Loss (mL): <77 mL    Complications: None    Specimens:   ID Type Source Tests Collected by Time Destination   A : RIGHT COLON AND SMALL BOWEL Tissue Tissue SURGICAL PATHOLOGY Carroll Klein MD 8/23/2021 1707        Implants:  * No implants in log *      Drains:   Negative Pressure Wound Therapy Abdomen (Active)   Dressing Status Clean;Dry; Intact 08/23/21 1905       Urethral Catheter (Active)   Catheter Indications Need for fluid volume management of the critically ill patient in a critical care setting 08/17/21 0802   Site Assessment No urethral drainage 08/17/21 0802   Urine Color Yellow 08/20/21 2300   Urine Appearance Clear 08/20/21 2300   Urine Odor Malodorous 08/20/21 2300   Output (mL) 550 mL 08/19/21 0912       Urethral Catheter 16 fr (Active)   Urine Color Yellow 08/23/21 1905   Urine Appearance Clear 08/23/21 1905       Findings: large pedunculated near obstructing cecal mass, s/p right hemicolectomy     Detailed Description of Procedure:     HISTORY: Gloria Finlye is a 73 y.o. female who presented to the hospital for altered mental status and fever. She underwent a CAT scan of the abdomen pelvis which demonstrated a large cecal mass. She underwent a colonoscopy which was concerning for colon cancer. Metastatic work-up was otherwise negative. Open right hemicolectomy possible ostomy was recommended. The risks benefits and alternatives of the procedure were discussed with the patient who stated understanding and agreed to proceed.     DESCRIPTION OF PROCEDURE: The patient was brought to the operating room and positioned supine on the OR table. Sequential compression devices were placed on the patient's lower extremities and functioning. Preoperative antibiotics were administered. Anesthesia was obtained without complication as per the anesthesia record. A Ramirez was inserted. Immediately prior to the procedure a time-out was called and the surgical checklist was reviewed and agreed upon by all present. The patient was prepped and draped in the usual sterile fashion. Midline incision was carried through the dermis down to subcutaneous fat with a 15 blade scalpel. The subcutaneous fat was divided with the Bovie electrocautery down to the fascia. Fascia was scored and the peritoneal cavity was entered bluntly with a finger the fascia was then opened in a cephalad and caudad fashion. Hemostasis was obtained. The self-retaining Bookwalter retractor was assembled and the abdominal wall was retracted open. Attention turned to the right lower quadrant where the tattooed and large palpable cecal mass was palpated. A green towel was placed into the abdomen and the small bowel packed into the left lateral abdomen. The cecum was grasped elevated and the white line of Toldt was scored open with electrocautery and the lateral attachments were taken down towards the hepatic flexure. There was constant traction on the cecum and ascending colon medially the attachments were taken down bluntly and with electrocautery. The duodenum was identified and remained outside of our dissection field. Once up to the hepatic flexure the attachments were released to approximately the right half of the transverse colon. The transverse colon was elevated and the middle colic artery was identified. A window was made in the colonic mesentery just to the right of the middle colic artery and the colon was divided with a AZEEM 75 blue load stapler.   Our attention then turned to the terminal ileum, approximately 10 cm from the ileocecal valve a window was made within the small bowel mesentery with electrocautery and the distal ileum was divided with AZEEM 75 blue load. The remaining mesentery attachments including the ileocolic pedicle was then divided with the LigaSure device. Once the specimen was completely freed and divided it was passed off to the back table for further pathological review. The resection area was then reinspected and hemostasis was achieved. Attention then turned to making the anastomosis  An enterotomy was made on the antimesenteric border of the distal terminal ileum, and a colotomy was made on the free tinea. Utilizing a AZEEM 75 blue load and a antiperistaltic, side to side, functional end to end anastomosis was created. The common channel was grasped with Allis graspers and utilizing a TA 60 stapler was divided. A crotch stitch was placed with a 3-0 silk. The common channel staple line was imbricated in a Lembert fashion. The omentum was used to fashion a pedicle flap with the LigaSure device and was then secured over the staple line as a omental pedicle flap with 3-0 silk to further buttress the repair. There was no tension on the anastomosis. The midline was then closed with 0 looped PDS x2. Skin was then closed with 3-0 Vicryl in a deep dermal fashion and a 4-0 Monocryl in a running subcuticular fashion. The midline wound was then glued which was allowed to dry and a sterile KALIE wound VAC was applied. Needle, sponge, and instrument counts were reported as correct x2. Dr. Alesha Faye was present and scrubbed throughout the case. The patient tolerated the procedure well without complications. She was transferred to the recovery area in good condition. Electronically signed by Shani Gonzalez MD on 8/23/2021 at 8:57 PM    Attestation:    I was present during the procedure and participated in all caal aspects.     Ángel Acosta MD

## 2021-08-24 NOTE — ANESTHESIA POSTPROCEDURE EVALUATION
Department of Anesthesiology  Postprocedure Note    Patient: Steffen Pate  MRN: [id number]  YOB: 1955  Date of evaluation: 8/23/2021  Time:  8:20 PM     Procedure Summary     Date: 08/23/21 Room / Location: [facility] OR 10 / [facility]    Anesthesia Start: 8815 Anesthesia Stop: 9486    Procedure: OPEN RIGHT HEMICOLECTOMY (Right Abdomen) Diagnosis: (/)    Surgeons: Oneil Hamman, MD Responsible Provider: Latricia Gilbert DO    Anesthesia Type: general ASA Status: 4          Anesthesia Type: general    Filomena Phase I: Filomena Score: 8    Filomena Phase II: Filomena Score: 10    Last vitals: Reviewed and per EMR flowsheets.        Anesthesia Post Evaluation    Patient location during evaluation: PACU  Patient participation: complete - patient participated  Level of consciousness: awake and alert  Airway patency: patent  Nausea & Vomiting: no nausea and no vomiting  Complications: no  Cardiovascular status: hemodynamically stable  Respiratory status: acceptable  Hydration status: euvolemic

## 2021-08-24 NOTE — CARE COORDINATION
POD #1 colon resection; CLD today; await PT/OT evals ; for now set up with [facility] orders on chart. Iliana Chakraborty.

## 2021-08-24 NOTE — PROGRESS NOTES
Britney Yeung Hospitalist   Progress Note    Admitting Date and Time: 8/16/2021  5:28 PM  Admit Dx: UTI (urinary tract infection) [N39.0]  Generalized weakness [R53.1]  Ambulatory dysfunction [R26.2]  Urinary tract infection without hematuria, site unspecified [N39.0]    Subjective:    Patient was admitted with UTI (urinary tract infection) [N39.0]  Generalized weakness [R53.1]  Ambulatory dysfunction [R26.2]  Urinary tract infection without hematuria, site unspecified [N39.0]. POD #1 open hemicolectomy with anastamosis. Seems comfortable, although pain at incision. Tolerating some sips of clears. ROS: denies fever, chills, cp, sob, n/v, HA unless stated above.      enoxaparin  40 mg Subcutaneous Daily    amoxicillin-clavulanate  1 tablet Oral Q12H    atorvastatin  40 mg Oral Nightly    sodium chloride flush  10 mL Intravenous 2 times per day    ferrous sulfate  325 mg Oral BID WC     naloxone, 0.4 mg, PRN  sodium chloride flush, 10 mL, PRN  sodium chloride, 25 mL, PRN  polyethylene glycol, 17 g, Daily PRN  acetaminophen, 650 mg, Q6H PRN   Or  acetaminophen, 650 mg, Q6H PRN         Objective:    BP (!) 159/70   Pulse 60   Temp 97.8 °F (36.6 °C) (Oral)   Resp 16   Ht 5' 3\" (1.6 m)   Wt 158 lb 4.8 oz (71.8 kg)   LMP 02/14/2011   SpO2 97%   BMI 28.04 kg/m²   General Appearance: alert and oriented to person, place and time, in no acute distress   Skin: warm and dry, no rash or erythema  Head: normocephalic and atraumatic  Neck: supple and non-tender   Pulmonary/Chest: clear to auscultation bilaterally  Cardiovascular: normal rate, regular rhythm, normal S1 and S2, no murmurs  Abdomen: midline incision covered, no redness, belly soft, no guarding, very tender near incision  Extremities: no edema      Recent Labs     08/22/21  0350 08/23/21  0425    138   K 3.5 3.6    102   CO2 31* 28   BUN 5* 5*   CREATININE 0.7 0.8   GLUCOSE 92 94   CALCIUM 8.7 8.3*       Recent Labs 08/22/21  0350 08/23/21  0425   WBC 5.2 5.7   RBC 4.04 3.78   HGB 8.4* 7.8*   HCT 29.5* 28.0*   MCV 73.0* 74.1*   MCH 20.8* 20.6*   MCHC 28.5* 27.9*   RDW 22.9* 23.7*    278   MPV 8.9 9.0         Radiology:   CT CHEST W CONTRAST   Final Result   Cardiomegaly with coronary artery calcification and moderate pericardial   effusion. COPD with pleural based nodular density in the right lower lobe probably   scarring. Malignancy is less likely. 3-5 mm additional nodules are noted in   the right lower lobe. Surveillance according to Fleischner society   guidelines are recommended. RECOMMENDATIONS:   Nodular density in the right lung base. The surveillance according to   Fleischner society guidelines is recommended         US DUP LOWER EXTREMITIES BILATERAL VENOUS   Final Result   No evidence of DVT in either lower extremity. CT ABDOMEN PELVIS W IV CONTRAST Additional Contrast? Oral   Final Result   1. Fair large cecal neoplasm causing encasement of the central lumen of the   bowel in the cecal area, with the extension and encasement into the terminal   ileum and the proximal ascending colon. 2.  Presence of satellite mesenteric adenopathy adjacent the cecal area, most   likely metastatic. 3.  Some inflammatory reaction surrounds the pericecal spaces. The could not   conspicuously identified the appendix. The small well-contained air density   seen peripherally to the cecum which can represent remnant of an appendix   also involved by cecal mass or a small area of well contained localized   perforation (images: A2/34; A3/90 A1/121). .         CT HEAD WO CONTRAST   Final Result   No acute intracranial abnormality. Age-related loss of brain volume and   chronic periventricular and subcortical white matter ischemic changes. Multifocal chronic infarcts as described. Multiple chronic small basal ganglia and corona radiata lacunar infarcts.          XR CHEST PORTABLE   Final Result   No acute cardiopulmonary abnormality. XR HIP BILATERAL W AP PELVIS (2 VIEWS)   Final Result   1. No acute abnormality. 2. Calcified fibroid uterus. Assessment:    Principal Problem:    Malignant neoplasm of ascending colon (HCC)  Active Problems:    UTI (urinary tract infection)  Resolved Problems:    * No resolved hospital problems. *      Plan:    1.  Mental status changes  Due to sepsis - bacteremia, and large cecal tumor.     Also had right parietal stroke in May, symptoms seem to have been mostly mental status related and not motor.     I am assuming patient at baseline at this time, although would need to confirm in person with family member.     2.  Sepsis/bateremia -   Thought initially secondary to UTI. Urine culture negative     2/2 blood cultures positive for gram variable rods - clostridium septicum  Antibiotics changed to ertapenem and CT abdomen shows large cecal mass likely a neoplasm with extension and encasement of the terminal ileum and proximal ascending colon.     3.  Anemia  Hgb 8.2 on admission, but had IVF and dropped to 6.2 - transfused one unit and now up to 7.2, transfused up to hgb 8.4,yesterday 7.8.    Did have surgery yesterday, labs still pending today.     No bleeding seen.  However iron level is very low.  Received iv iron X 2 as well. Watch hgb daily. Likely bleeding chronically due to cecal mass.     Stopping eliquis for now.     4.  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 Patient should be on eliquis. Also on plavix and if embolic I don't see this as additive - will stop.     5.  History of DVT  Stopping eliquis.     Lower extremities now negative for DVT.   Will watch, patient at very high risk for recurrence, but no immediate indication for IVC filter.     Spoke with surgery, prophylactic dose lovenox post op and then will transition back

## 2021-08-24 NOTE — PROGRESS NOTES
[address] [place] [address] [facility]  JALYNIDA  Progress Note    SUBJECTIVE:  Chief Complaint   Patient presents with    Fall     frequent falls.  Nausea     Lying in bed. Has PCA pump  Pain is controlled. Feels bloated. Tolerating CLD    Review of systems:  As stated above in the chief complaint, otherwise negative. Medications:  Scheduled Meds:   enoxaparin  40 mg Subcutaneous Daily    amoxicillin-clavulanate  1 tablet Oral Q12H    atorvastatin  40 mg Oral Nightly    sodium chloride flush  10 mL Intravenous 2 times per day    ferrous sulfate  325 mg Oral BID WC     Continuous Infusions:   lactated ringers 100 mL/hr at 21 0557    HYDROmorphone      sodium chloride       PRN Meds:naloxone, sodium chloride flush, sodium chloride, polyethylene glycol, acetaminophen **OR** acetaminophen    OBJECTIVE:  BP (!) 140/63   Pulse 61   Temp 98.2 °F (36.8 °C) (Oral)   Resp 16   Ht 5' 3\" (1.6 m)   Wt 158 lb 4.8 oz (71.8 kg)   LMP 2011   SpO2 98%   BMI 28.04 kg/m²   Temp  Av.3 °F (36.3 °C)  Min: 96.4 °F (35.8 °C)  Max: 98.2 °F (36.8 °C)  Constitutional: The patient is awake, alert, and oriented. Lying in bed. Comfortable. Skin: Warm and dry. No rashes were noted. HEENT: Round and reactive pupils. Moist mucous membranes. No ulcerations or thrush. Neck: Supple to movements. Chest: No use of accessory muscles to breathe. Symmetrical expansion. No wheezing, crackles or rhonchi. Cardiovascular: S1 and S2 are rhythmic and regular. No murmurs appreciated. Abdomen: hypoactive bowel sounds. Tender, softly distended. KALIE dressing to the midline - no drainage noted. Extremities: trace edema.   Lines: peripheral    Laboratory and Tests Review:  Lab Results   Component Value Date    WBC 5.7 2021    WBC 5.2 2021    WBC 5.9 2021    HGB 7.8 (L) 2021    HCT 28.0 (L) 2021    MCV 74.1 (L) 2021     2021     Lab Results   Component Value Date    NEUTROABS 3.84 08/23/2021    NEUTROABS 3.85 08/22/2021    NEUTROABS 4.31 08/21/2021     No results found for: CRPHS  Lab Results   Component Value Date    ALT <5 08/16/2021    AST 13 08/16/2021    ALKPHOS 110 (H) 08/16/2021    BILITOT 0.7 08/16/2021     Lab Results   Component Value Date     08/23/2021    K 3.6 08/23/2021     08/23/2021    CO2 28 08/23/2021    BUN 5 08/23/2021    CREATININE 0.8 08/23/2021    CREATININE 0.7 08/22/2021    CREATININE 0.8 08/21/2021    GFRAA >60 08/23/2021    LABGLOM >60 08/23/2021    GLUCOSE 94 08/23/2021    PROT 6.9 08/16/2021    LABALBU 3.3 08/16/2021    CALCIUM 8.3 08/23/2021    BILITOT 0.7 08/16/2021    ALKPHOS 110 08/16/2021    AST 13 08/16/2021    ALT <5 08/16/2021     Lab Results   Component Value Date    CRP 2.1 (H) 08/20/2021     Lab Results   Component Value Date    SEDRATE 35 (H) 08/20/2021     Radiology:  CT abd/pelvis   Impression:        1.  Fair large cecal neoplasm causing encasement of the central lumen of the   bowel in the cecal area, with the extension and encasement into the terminal   ileum and the proximal ascending colon. 2.  Presence of satellite mesenteric adenopathy adjacent the cecal area, most   likely metastatic. 3.  Some inflammatory reaction surrounds the pericecal spaces.  The could not   conspicuously identified the appendix.  The small well-contained air density   seen peripherally to the cecum which can represent remnant of an appendix   also involved by cecal mass or a small area of well contained localized   perforation (images: A2/34; A3/90 A1/121). .        Microbiology:   Blood cultures 8/16/2021: Clostridium septicum 2/4 bottles    Urine culture 8/20/2021: Negative so far  Rapid SARS-CoV-2: Not detected  8/20/2021 blood cultures: negative so far  MRSA screening: pending       ASSESSMENT:  · Fever. Resolved   · Large cecal neoplasm.   Status post colonoscopy with biopsy and tattooing 8/22/2021  · Status post right hemicolectomy 8/23/2021  · Anaerobic gram-positive eladio bacteremia associated to cecal neoplasm  · Hypokalemia  · History of recent CVA     PLAN:  · Continue Augmentin for a few more days  · Surgery following - status post right hemicolectomy    · We will follow with you    LEYDA Schultz - CNP  11:54 AM  8/24/2021     Patient seen and examined. I had a face to face encounter with the patient. Agree with exam.  Assessment and plan as outlined above and directed by me. Addition and corrections were done as deemed appropriate. My exam and plan include: The patient had surgery and had a hemicolectomy. She is sitting up in a chair and falling asleep. She is in no distress. Continue Augmentin.     Jean Griggs MD  8/24/2021  3:48 PM

## 2021-08-24 NOTE — PROGRESS NOTES
errors may be present. Please excuse any transcriptional grammatical or spelling errors that may have escaped my editorial review.

## 2021-08-24 NOTE — PROGRESS NOTES
Occupational Therapy  OCCUPATIONAL THERAPY RE-EVALUATION  [facility] [address]    Date: 2021     Patient Name: Jolanta Orozco  MRN: [id number]  : 1955  Room: [address] [place] [address]    Evaluating/Re-Evaluating OT: Lee Ann Mei, OTR/L - [id number]    Referring Provider: Pablo Lopes MD  Specific Provider Orders/Date: \"OT eval and treat\" - 2021    Diagnosis: UTI (urinary tract infection) [N39.0], Generalized weakness [R53.1], Ambulatory dysfunction [R26.2], Urinary tract infection without hematuria, site unspecified [N39.0]     Surgery: Patient underwent OPEN RIGHT HEMICOLECTOMY WITH OMENTAL PEDICLE FLAP on 2021. Pertinent Medical History: CVA, obesity, HTN, OA, chronic back pain      Precautions: fall risk, abdominal splinting, O2 via nasal cannula, PCA pump, KALIE    Assessment of Current Deficits:   [x] Functional mobility   [x]ADLs  [x] Strength               [x]Cognition   [x] Functional transfers   [x] IADLs         [x] Safety Awareness   [x]Endurance   [] Fine Coordination              [x] Balance      [] Vision/perception   [x]Sensation    []Gross Motor Coordination  [] ROM  [] Delirium                   [] Motor Control     OT PLAN OF CARE (UPDATED 2021)   OT POC is based on physician orders, patient diagnosis, and results of clinical assessment.   Frequency/Duration 2-5 days/week for additional 2 weeks PRN   Specific OT Treatment Interventions to Include:   * Instruction/training on adapted ADL techniques and AE recommendations to increase functional independence within precautions       * Training on energy conservation strategies, correct breathing pattern and techniques to improve independence/tolerance for self-care routine  * Functional transfer/mobility training/DME recommendations for increased independence, safety, and fall prevention  * Patient/Family education to increase follow through with safety techniques and functional independence  * Recommendation of environmental modifications for increased safety with functional transfers/mobility and ADLs  * Therapeutic exercise to improve motor endurance, ROM, and functional strength for ADLs/functional transfers  * Therapeutic activities to facilitate/challenge dynamic balance, stand tolerance for increased safety and independence with ADLs  * Neuro-muscular re-education: facilitation of righting/equilibrium reactions, midline orientation, scapular stability/mobility, normalization of muscle tone, and facilitation of volitional active controled movement    Recommended Adaptive Equipment: TBD    Home Living: Patient reported that she lives with her son (who works) in a two-floor home (few steps to enter); patient noted that she stays on the main living level where she sleeps on the couch. Bathroom Setup: tub - patient reported that she sits down in the tub to bathe  Equipment Owned: walker     Prior Level of Function (PLOF): Patient reported that she was independent with ADLs and some light, home-based IADLs (e.g. meal prep tasks); patient noted that her son assists with laundry and other IADLs. Patient reported that she was independent with functional mobility (with walker) prior to this hospitalization. Patient is a questionable historian; no family/caregiver present to verify information gathered. Driving: No    Pain Level: Patient reported experiencing abdominal pain, but did not rate her pain. Cognition: Patient alert and oriented grossly; confusion noted occasionally. Fair command follow demonstrated. Slowed processing demonstrated.   Memory: Fair  Sequencing: Fair  Problem Solving: Impaired  Judgement/Safety: Impaired    Functional Assessment:  AM-PAC Daily Activity Raw Score: 14/24   Initial Eval Status  Date: 8/17/2021 Re-Eval Status:  Date: 8/24/2021 Treatment Status  Date:  Short Term Goals = Long Term Goals   Feeding SBA SBA  Independent Grooming Mod A Mod A  SBA  (seated/standing at sink)   UB Dressing Min A to doff/don hospital gown. Min A  Setup   LB Dressing Max A needed to don socks and L surgical shoe. Max A  SBA - with use of AE, as needed/appropriate   Bathing Max A  SBA for UB sponge bathing tasks while seated at EOB. Mod A  SBA - with use of AE/DME, as needed/appropriate   Toileting Max A for hygiene (while standing with walker). Patient with suazo catheter currently. Max A  Patient with suazo catheter currently. Supervision   Bed Mobility  Supine-to-Sit: Mod A  Sit-to-Supine: Min A  Supine-to-Sit: Mod A  Cues given to facilitate log rolling technique. Functional Transfers Sit-to-Stands: Mod A   from EOB  Cues needed to maximize safety with functional transfers. Sit-to-Stand: Min A   from EOB  Supervision   Functional Mobility Mod A   (with walker) for short distance within patient's room; increased time needed. Consistent assistance needed with management of walker, particularly during turns, to ensure safety. Min A to Mod A   (with walker) within patient's room; increased time needed. Assistance needed with management of walker at times, particularly during turns. Increased assistance needed as patient fatigued. Supervision with functional mobility (with device, as needed/appropriate) in order to maximize independence with ADLs/IADLs and other functional tasks. Balance Sitting: Fair+  (at EOB)  Standing: Poor+  (with walker) for short distance within patient's room. Sitting: Fair+  (at EOB)  Standing: Fair- to Poor+  (with walker)    Fair+ dynamic standing balance during completion of ADLs/IADLs and other functional tasks. Activity Tolerance Limited Fair-  Patient will demonstrate Good understanding and consistent implementation of energy conservation techniques and work simplification techniques into ADL routines.    Visual/  Perceptual Fair      N/A   B UE Strength R UE: 4/5 grossly  L Shoulder: 3+/5   Distal L UE: 4-/5 R UE: 4/5 grossly  L Shoulder: 3+/5   Distal L UE: 4-/5  Patient will demonstrate 4/5 B UE strength in order to maximize independence with ADLs and functional transfers. Additional Long-Term Goal: Patient will increase functional independence to PLOF in order to allow patient to live in least restrictive environment. ROM: Additional Information:    R UE  AROM WFL    L UE AROM WFL grossly      Hearing: WFL  Sensation: No complaints of numbness/tingling in B UEs. Tone: WFL  Edema: No    Comments: Upon arrival, patient supine in bed. At end of session, patient seated in bedside chair with call light and phone within reach and all lines and tubes intact. Patient would benefit from continued skilled OT to increase safety and independence with completion of ADL/IADL tasks for functional independence and quality of life. OT Re-Evaluation indicated secondary to patient now s/p OPEN RIGHT HEMICOLECTOMY WITH OMENTAL PEDICLE FLAP (8/23/2021). Rehab Potential: Good for established goals. Patient / Family Goal: No goal stated. Patient and/or family were instructed on functional diagnosis, prognosis/goals, and OT plan of care. Demonstrated Fair understanding. Time In: 1448  Time Out: 1508  Total Treatment Time: 0 minutes      Minutes Units   OT Eval Low 06987     OT Eval Medium 82703     OT Eval High 59811     OT Re-Eval 64200 20 1   Therapeutic Ex 50470     Therapeutic Activities 80608     ADL/Self Care 86815     Orthotic Management 05344     Neuro Re-Ed 71790     Non-Billable Time N/A ---     Evaluation time includes thorough review of current medical information, gathering information on past medical history/social history and prior level of function, completion of standardized testing/informal observation of tasks, assessment of data, and education on plan of care and goals. Yolande Alvarez, CANDIE/L  License Number: [id number]

## 2021-08-24 NOTE — PROGRESS NOTES
Physical Therapy    Facility/Department: [address] MED SURG/TELE  Re-Assessment    NAME: Tesfaye Olvera  : 1955  MRN: [id number]    Date of Service: 2021            Patient Diagnosis(es): The primary encounter diagnosis was Urinary tract infection without hematuria, site unspecified. Diagnoses of Generalized weakness and Ambulatory dysfunction were also pertinent to this visit. has a past medical history of Acute gastroenteritis, Cerebral artery occlusion with cerebral infarction [facility]), Chronic back pain, Headache(784.0), Hyperlipidemia, Hypertension, Obesity, Osteoarthritis, and Tobacco abuse.   has a past surgical history that includes Tubal ligation (); Upper gastrointestinal endoscopy (N/A, 2021); and colectomy (Right, 2021). Evaluating Therapist: Lennox Moros, PT      Referring Provider:  Julián Arnold MD     PT order : PT eval and treat      Room #:  429   DIAGNOSIS:  UTI . Recent falls . Re-assessment  due to R hemicolectomy   L 5th metatarsal fx 2021  - pt wears surgical shoe      PRECAUTIONS: falls, splinting        Social:  Pt lives with  Son  in a  2  floor plan with first floor set up,   2  steps and  1  rails to enter. Prior to admission pt walked with ww        Re-Evaluation  Date:  2021  Treatment      Short Term/ Long Term   Goals   Was pt agreeable to Eval/treatment?   yes        Does pt have pain? abd       Bed Mobility  Rolling:  min assist    Supine to sit:  mod assist   Sit to supine: NT   Scooting: min assist     SBA    Transfers Sit to stand: min assist   Stand to sit: min  assist   Stand pivot: NT     SBA    Ambulation     30  feet with ww  with  min assist progressing Mod assist with fatigue     100  feet with  ww  with  SBA          Stair negotiation: ascended and descended NT     4  steps with  1  rail with  CGA    LE ROM  WFL       LE strength  R LE 4/ 5   L LE 4-/ 5        AM- PAC RAW score  15/ 24                 Pt is alert and Oriented x  3, but confusion noted throughout eval       Balance:  Min assist, high fall risk due to decreased balance and poor safety awareness     Endurance: decreased   Bed/Chair alarm: Yes      ASSESSMENT    Pt displays functional ability as noted in the objective portion of this re-evaluation.          Conditions Requiring Skilled Therapeutic Intervention:     [x]? Decreased strength                                      []?Decreased ROM  [x]? Decreased functional mobility  [x]? Decreased balance   [x]? Decreased endurance   [x]? Decreased posture  []? Decreased sensation  [x]? Decreased coordination   []? Decreased vision  [x]? Decreased safety awareness   []? Increased pain                Treatment/Education:    Mobility as above. Pt displayed delayed processing throughout eval. Instructed on splinting for comfort and log rolling with bed mobility. L surgical shoe donned prior to mobility. Cues and assist needed for hand placement with transfers, posture in stand, and safe ww maneuvering and use. Needed assist with ww maneuvering, especially with turns . Tactile cues needed for postural correction in stand and gait and constant cues needed to stay within DEANDRA of ww. Heany labored breathing after mobility. Pulse on on RA 96%. Cues for proper breathing technique given     Pt educated on fall risk,  Safety and mobility         Patient response to education:   Pt verbalized understanding Pt demonstrated skill Pt requires further education in this area   x  with assist   x         Comments:  Pt left  In chair after session, with call light in reach.        Rehab potential is Good for reaching above PT goals.       Pts/ family goals     1. None stated     Patient and or family understand(s) diagnosis, prognosis, and plan of care. -  Questionable      PLAN    PT care will be provided in accordance with the objectives noted above. Whenever appropriate, clear delegation orders will be provided for nursing staff.   Exercises and functional mobility practice will be used as well as appropriate assistive devices or modalities to obtain goals. Patient and family education will also be administered as needed.           PLAN OF CARE:     Current Treatment Recommendations      [x]? Strengthening to improve independence with functional mobility   []? ROM to improve independence with functional mobility   [x]? Balance Training to improve static/dynamic balance and to reduce fall risk  [x]? Endurance Training to improve activity tolerance during functional mobility   [x]? Transfer Training to improve safety and independence with all functional transfers   [x]? Gait Training to improve gait mechanics, endurance and assess need for appropriate assistive device  [x]? Stair Training in preparation for safe discharge home and/or into the community   [x]? Positioning to prevent skin breakdown and contractures  [x]? Safety and Education Training   [x]? Patient/Caregiver Education   []? HEP  []? Other      Frequency of treatments will be 2-5x/week x  7-10 days.     Time in: 1445   Time out: 1505         Re-Evaluation Time includes thorough review of current medical information, gathering information on past medical history/social history and prior level of function, completion of standardized testing/informal observation of tasks, assessment of data and education on plan of care and goals.     CPT codes:  []? Low Complexity PT evaluation [id number]  []? Moderate Complexity PT evaluation 11455  []? High Complexity PT evaluation [id number]  [x]? PT Re-evaluation [id number]  []? Gait training 43506  minutes  []? Therapeutic activities 39076  minutes  []? Therapeutic exercises 31455  minutes  []?  Neuromuscular reeducation 70648  minutes         Melissa Cabrera number:  PT [phone]

## 2021-08-24 NOTE — PROGRESS NOTES
Comprehensive Nutrition Assessment    Type and Reason for Visit:  Initial    Nutrition Recommendations/Plan: Continue Current diet and added ONS to help optimize nutrient needs. Nutrition Assessment:  Pt adm with Sepsis secondary to UTI. Pt Iron deficiency anemia and recieved blood transfusion 8/18. Large cecal neoplasm. S/p colonoscopy with biopsy and tattooing 8/22 Dx Cecal and ascending colon cancer. Pt wanting to go to  for surgery. Pt stated prior to adm poor appetite and PO ~2wks. Noted wt loss of 21#s in 3months. Malnutrition Assessment:  Malnutrition Status:  No malnutrition    Context:  Chronic Illness     Findings of the 6 clinical characteristics of malnutrition:  Energy Intake:  Mild decrease in energy intake (Comment) (pt stated prior to adm poor appetite and PO ~1-2wks)  Weight Loss:  7 - 7.5% over 3 months     Body Fat Loss:  No significant body fat loss     Muscle Mass Loss:  No significant muscle mass loss    Fluid Accumulation:  No significant fluid accumulation     Strength:  Not Performed    Estimated Daily Nutrient Needs:  Energy (kcal):   (MSJx1. 2SF); Weight Used for Energy Requirements:  Current     Protein (g):  70-80g (1.3-1.5g/kg IBW); Weight Used for Protein Requirements:  Ideal        Fluid (ml/day):  ; Method Used for Fluid Requirements:  1 ml/kcal      Nutrition Related Findings:  A&Ox4, BS active, Abd rounded, -I/Os, no Edema      Wounds:  None       Current Nutrition Therapies:    ADULT DIET; Clear Liquid    Anthropometric Measures:  · Height: 5' 3\" (160 cm)  · Current Body Weight: 158 lb 4.8 oz (71.8 kg) (8/21)   · Admission Body Weight: 162 lb (73.5 kg) (8/17 [facility])    · Usual Body Weight:       · Ideal Body Weight: 115 lbs; % Ideal Body Weight 137.7 %   · BMI: 28    · BMI Categories: Overweight (BMI 25.0-29. 9)       Nutrition Diagnosis:   · Inadequate oral intake related to catabolic illness (Large cecal neoplasm.   Status post colonoscopy with

## 2021-08-25 LAB
ACANTHOCYTES: ABNORMAL
ANION GAP SERPL CALCULATED.3IONS-SCNC: 7 MMOL/L (ref 7–16)
ANISOCYTOSIS: ABNORMAL
BASOPHILS ABSOLUTE: 0.01 E9/L (ref 0–0.2)
BASOPHILS RELATIVE PERCENT: 0.1 % (ref 0–2)
BUN BLDV-MCNC: 11 MG/DL (ref 6–23)
CALCIUM SERPL-MCNC: 9.1 MG/DL (ref 8.6–10.2)
CHLORIDE BLD-SCNC: 104 MMOL/L (ref 98–107)
CO2: 29 MMOL/L (ref 22–29)
CREAT SERPL-MCNC: 0.8 MG/DL (ref 0.5–1)
CULTURE, BLOOD 2: NORMAL
EOSINOPHILS ABSOLUTE: 0 E9/L (ref 0.05–0.5)
EOSINOPHILS RELATIVE PERCENT: 0 % (ref 0–6)
GFR AFRICAN AMERICAN: >60
GFR NON-AFRICAN AMERICAN: >60 ML/MIN/1.73
GLUCOSE BLD-MCNC: 124 MG/DL (ref 74–99)
HCT VFR BLD CALC: 29.5 % (ref 34–48)
HEMOGLOBIN: 8.4 G/DL (ref 11.5–15.5)
HYPOCHROMIA: ABNORMAL
IMMATURE GRANULOCYTES #: 0.08 E9/L
IMMATURE GRANULOCYTES %: 0.9 % (ref 0–5)
LYMPHOCYTES ABSOLUTE: 0.69 E9/L (ref 1.5–4)
LYMPHOCYTES RELATIVE PERCENT: 8.1 % (ref 20–42)
MCH RBC QN AUTO: 21.1 PG (ref 26–35)
MCHC RBC AUTO-ENTMCNC: 28.5 % (ref 32–34.5)
MCV RBC AUTO: 73.9 FL (ref 80–99.9)
MONOCYTES ABSOLUTE: 0.37 E9/L (ref 0.1–0.95)
MONOCYTES RELATIVE PERCENT: 4.3 % (ref 2–12)
MRSA CULTURE ONLY: NORMAL
NEUTROPHILS ABSOLUTE: 7.41 E9/L (ref 1.8–7.3)
NEUTROPHILS RELATIVE PERCENT: 86.6 % (ref 43–80)
OVALOCYTES: ABNORMAL
PDW BLD-RTO: 24.8 FL (ref 11.5–15)
PLATELET # BLD: 276 E9/L (ref 130–450)
PMV BLD AUTO: 8.6 FL (ref 7–12)
POIKILOCYTES: ABNORMAL
POLYCHROMASIA: ABNORMAL
POTASSIUM REFLEX MAGNESIUM: 4.5 MMOL/L (ref 3.5–5)
RBC # BLD: 3.99 E12/L (ref 3.5–5.5)
SCHISTOCYTES: ABNORMAL
SODIUM BLD-SCNC: 140 MMOL/L (ref 132–146)
TEAR DROP CELLS: ABNORMAL
WBC # BLD: 8.6 E9/L (ref 4.5–11.5)

## 2021-08-25 PROCEDURE — 6370000000 HC RX 637 (ALT 250 FOR IP): Performed by: STUDENT IN AN ORGANIZED HEALTH CARE EDUCATION/TRAINING PROGRAM

## 2021-08-25 PROCEDURE — 97530 THERAPEUTIC ACTIVITIES: CPT

## 2021-08-25 PROCEDURE — 2580000003 HC RX 258: Performed by: STUDENT IN AN ORGANIZED HEALTH CARE EDUCATION/TRAINING PROGRAM

## 2021-08-25 PROCEDURE — 85025 COMPLETE CBC W/AUTO DIFF WBC: CPT

## 2021-08-25 PROCEDURE — 80048 BASIC METABOLIC PNL TOTAL CA: CPT

## 2021-08-25 PROCEDURE — 99232 SBSQ HOSP IP/OBS MODERATE 35: CPT | Performed by: INTERNAL MEDICINE

## 2021-08-25 PROCEDURE — 1200000000 HC SEMI PRIVATE

## 2021-08-25 PROCEDURE — 2700000000 HC OXYGEN THERAPY PER DAY

## 2021-08-25 PROCEDURE — 36415 COLL VENOUS BLD VENIPUNCTURE: CPT

## 2021-08-25 PROCEDURE — 6360000002 HC RX W HCPCS: Performed by: STUDENT IN AN ORGANIZED HEALTH CARE EDUCATION/TRAINING PROGRAM

## 2021-08-25 RX ORDER — OXYCODONE HYDROCHLORIDE 5 MG/1
5 TABLET ORAL EVERY 4 HOURS PRN
Status: DISCONTINUED | OUTPATIENT
Start: 2021-08-25 | End: 2021-08-28 | Stop reason: HOSPADM

## 2021-08-25 RX ORDER — KETOROLAC TROMETHAMINE 30 MG/ML
15 INJECTION, SOLUTION INTRAMUSCULAR; INTRAVENOUS EVERY 6 HOURS PRN
Status: DISCONTINUED | OUTPATIENT
Start: 2021-08-25 | End: 2021-08-28 | Stop reason: HOSPADM

## 2021-08-25 RX ORDER — ACETAMINOPHEN 500 MG
1000 TABLET ORAL EVERY 8 HOURS SCHEDULED
Status: DISCONTINUED | OUTPATIENT
Start: 2021-08-25 | End: 2021-08-28 | Stop reason: HOSPADM

## 2021-08-25 RX ORDER — OXYCODONE HYDROCHLORIDE 5 MG/1
10 TABLET ORAL EVERY 4 HOURS PRN
Status: DISCONTINUED | OUTPATIENT
Start: 2021-08-25 | End: 2021-08-28 | Stop reason: HOSPADM

## 2021-08-25 RX ADMIN — AMOXICILLIN AND CLAVULANATE POTASSIUM 1 TABLET: 875; 125 TABLET, FILM COATED ORAL at 13:20

## 2021-08-25 RX ADMIN — AMOXICILLIN AND CLAVULANATE POTASSIUM 1 TABLET: 875; 125 TABLET, FILM COATED ORAL at 00:48

## 2021-08-25 RX ADMIN — FERROUS SULFATE TAB 325 MG (65 MG ELEMENTAL FE) 325 MG: 325 (65 FE) TAB at 18:19

## 2021-08-25 RX ADMIN — ACETAMINOPHEN 1000 MG: 500 TABLET ORAL at 22:11

## 2021-08-25 RX ADMIN — ACETAMINOPHEN 1000 MG: 500 TABLET ORAL at 13:20

## 2021-08-25 RX ADMIN — ATORVASTATIN CALCIUM 40 MG: 40 TABLET, FILM COATED ORAL at 22:11

## 2021-08-25 RX ADMIN — ENOXAPARIN SODIUM 40 MG: 40 INJECTION SUBCUTANEOUS at 08:32

## 2021-08-25 RX ADMIN — FERROUS SULFATE TAB 325 MG (65 MG ELEMENTAL FE) 325 MG: 325 (65 FE) TAB at 08:31

## 2021-08-25 RX ADMIN — SODIUM CHLORIDE, POTASSIUM CHLORIDE, SODIUM LACTATE AND CALCIUM CHLORIDE: 600; 310; 30; 20 INJECTION, SOLUTION INTRAVENOUS at 03:37

## 2021-08-25 ASSESSMENT — PAIN SCALES - GENERAL
PAINLEVEL_OUTOF10: 0
PAINLEVEL_OUTOF10: 1

## 2021-08-25 NOTE — PROGRESS NOTES
Occupational Therapy  OT BEDSIDE TREATMENT NOTE      Date:2021  Patient Name: Divine Peña  MRN: [id number]  : 1955  Room: [address] [place] [address]     Evaluating/Re-Evaluating OT: Nate Bob, OTR/L - [id number]     Referring Provider: Porsha Morrell MD  Specific Provider Orders/Date: \"OT eval and treat\" - 2021     Diagnosis: UTI (urinary tract infection) [N39.0], Generalized weakness [R53.1], Ambulatory dysfunction [R26.2], Urinary tract infection without hematuria, site unspecified [N39.0]      Surgery: Patient underwent OPEN RIGHT [address] on 2021. Pertinent Medical History: CVA, obesity, HTN, OA, chronic back pain       Precautions: fall risk, abdominal splinting, O2 via nasal cannula, PCA pump, KALIE     Assessment of Current Deficits:   [x]? Functional mobility             [x]?ADLs           [x]? Strength                  [x]? Cognition   [x]? Functional transfers           [x]? IADLs         [x]? Safety Awareness   [x]? Endurance   []? Fine Coordination              [x]? Balance      []? Vision/perception   [x]? Sensation     []? Gross Motor Coordination  []? ROM           []? Delirium                   []? Motor Control      OT PLAN OF CARE (UPDATED 2021)   OT POC is based on physician orders, patient diagnosis, and results of clinical assessment.   Frequency/Duration 2-5 days/week for additional 2 weeks PRN   Specific OT Treatment Interventions to Include:   * Instruction/training on adapted ADL techniques and AE recommendations to increase functional independence within precautions       * Training on energy conservation strategies, correct breathing pattern and techniques to improve independence/tolerance for self-care routine  * Functional transfer/mobility training/DME recommendations for increased independence, safety, and fall prevention  * Patient/Family education to increase follow through with safety techniques and functional independence  * Recommendation of environmental modifications for increased safety with functional transfers/mobility and ADLs  * Therapeutic exercise to improve motor endurance, ROM, and functional strength for ADLs/functional transfers  * Therapeutic activities to facilitate/challenge dynamic balance, stand tolerance for increased safety and independence with ADLs  * Neuro-muscular re-education: facilitation of righting/equilibrium reactions, midline orientation, scapular stability/mobility, normalization of muscle tone, and facilitation of volitional active controled movement     Recommended Adaptive Equipment: TBD     Home Living: Patient reported that she lives with her son (who works) in a two-floor home (few steps to enter); patient noted that she stays on the main living level where she sleeps on the couch. Bathroom Setup: tub - patient reported that she sits down in the tub to bathe  Equipment Owned: walker     Prior Level of Function (PLOF): Patient reported that she was independent with ADLs and some light, home-based IADLs (e.g. meal prep tasks); patient noted that her son assists with laundry and other IADLs. Patient reported that she was independent with functional mobility (with walker) prior to this hospitalization. Patient is a questionable historian; no family/caregiver present to verify information gathered. Driving: No     Pain Level: abdomen-mild  Cognition: Patient alert and oriented x4     Functional Assessment:                  AM-PAC Daily Activity Raw Score: 18/24    Initial Eval Status  Date: 8/17/2021 Re-Eval Status:  Date: 8/24/2021 Treatment Status  Date: 8/25/21 Short Term Goals = Long Term Goals   Feeding SBA SBA   Independent   Grooming Mod A Mod A   SBA  (seated/standing at sink)   UB Dressing Min A to doff/don [facility] gown. Min A   Setup   LB Dressing Max A needed to don socks and L surgical shoe.  Max A Min A  Pt able to use crossover method to doff/ don R sock with SBA but required Min A to manage L sock due to decreased flexibility. See comments  SBA - with use of AE, as needed/appropriate   Bathing Max A  SBA for UB sponge bathing tasks while seated at EOB. Mod A   SBA - with use of AE/DME, as needed/appropriate   Toileting Max A for hygiene (while standing with walker). Patient with suazo catheter currently. Max A  Patient with suazo catheter currently.   Supervision   Bed Mobility  Supine-to-Sit: Mod A  Sit-to-Supine: Min A  Supine-to-Sit: Mod A  Cues given to facilitate log rolling technique.  Supine to sit: Mod A     Functional Transfers Sit-to-Stands: Mod A   from EOB  Cues needed to maximize safety with functional transfers. Sit-to-Stand: Min A   from EOB  STS: Min A  From EOB and chair Supervision   Functional Mobility Mod A   (with walker) for short distance within patient's room; increased time needed. Consistent assistance needed with management of walker, particularly during turns, to ensure safety. Min A to Mod A   (with walker) within patient's room; increased time needed. Assistance needed with management of walker at times, particularly during turns. Increased assistance needed as patient fatigued. CGA using ww in room  Supervision with functional mobility (with device, as needed/appropriate) in order to maximize independence with ADLs/IADLs and other functional tasks. Balance Sitting: Fair+  (at EOB)  Standing: Poor+  (with walker) for short distance within patient's room. Sitting: Fair+  (at EOB)  Standing: Fair- to Poor+  (with walker)    Sitting: Independent  Standing: CGA  Fair+ dynamic standing balance during completion of ADLs/IADLs and other functional tasks. Activity Tolerance Limited Fair-  Fair Patient will demonstrate Good understanding and consistent implementation of energy conservation techniques and work simplification techniques into ADL routines.    Visual/  Perceptual Fair        N/A   B UE Strength R UE: 4/5 grossly  L Shoulder: 3+/5   Distal L UE: 4-/5 R UE: 4/5 grossly  L Shoulder: 3+/5   Distal L UE: 4-/5  B UE WFL during tx            Treatment: Upon arrival pt was supine in bed. Pt declined AE training stating her family will assist her with ADLs as needed post D/C. Instructed pt on functional dynamic standing balance tasks facilitating weight shifting to simulate ADLs. At end of session pt was transferred to chair with alarm on, all lines and tubes intact and call light within reach. Education: Transfer training, abdominal precautions and compenstaory LE dressing techniques    · Pt has made good progress towards set goals.        Treatment Charges: Mins Units   Ther Ex  [id number]     Manual Therapy 80616     Thera Activities 41255 10 1   ADL/Home Mgt 21152 5 0   Neuro Re-ed 13217     Group Therapy      Orthotic manage/training  31527     Non-Billable Time       Time In: 1:30  Time Out: 1:45  Total Time: [address] HAY/L [id number]

## 2021-08-25 NOTE — PROGRESS NOTES
GENERAL SURGERY  DAILY PROGRESS NOTE  8/25/2021    Subjective:  Patient reports ambulating yesterday. No nausea, vomiting, or fevers. No BM yet. No further complaints today. PCA 29 deliveries for 110 requests in past 24 hours. Objective:  BP (!) 146/72   Pulse 62   Temp 97.8 °F (36.6 °C) (Oral)   Resp 18   Ht 5' 3\" (1.6 m)   Wt 158 lb 4.8 oz (71.8 kg)   LMP 02/14/2011   SpO2 (!) 88%   BMI 28.04 kg/m²     GENERAL:  No acute distress. Alert and interactive. Oriented x3. LUNGS:  No cough. Nonlabored breathing on 3L NC  CARDIOVASC:  Normal rate, no cyanosis. ABDOMEN:  Soft, mildly distended, appropriately tender. No guarding / rigidity / rebound. EXTREMITIES: Moves all extremities. No swelling or edema. Assessment/Plan:  73 y.o. female presenting with cecal mass on CTAP s/p open right hemicolectomy. - Continue clear liquid diet  - Encouraged ambulation and sitting up in chair  - pain/nausea control  - IVF  - awaiting return of bowel function    Plan discussed with Dr. Whit Rios. Electronically signed by Elie Georges DO on 8/25/2021 at 9:27 AM      Attending Physician Statement:    Chief Complaint:   Chief Complaint   Patient presents with    Fall     frequent falls.  Nausea       I have examined the patient and performed the key aspects of physical exam, reviewed the record (including all pertinent and new radiology images and laboratory findings), and discussed the case with the surgical team.  I agree with the assessment and plan with the following additions, corrections, and changes. 14pt review of symptoms completed and negative except as mentioned. Doing well. IS ordered. Has been OOB. abd benign, appropriate. Bx path did confirm adeno. Will ask onc to see. Await bowel function, clears. Jennifer Low MD  08/25/21  4:33 PM    NOTE: This report, in part or full, may have been transcribed using voice recognition software.  Every effort was made to ensure accuracy; however, inadvertent computerized transcription errors may be present. Please excuse any transcriptional grammatical or spelling errors that may have escaped my editorial review.

## 2021-08-25 NOTE — CARE COORDINATION
POD #2 ; CLD; ampac 16/24; PCA pump d/c'd; [address] following; orders on chart. Will follow PT/OT; monitor for poss need for rehab.oral atb's. Await return of bowel function. [facility].

## 2021-08-25 NOTE — PROGRESS NOTES
Physical Therapy    Facility/Department: [address] MED SURG/TELE  Treatment Note    NAME: Gloria Momin  : 1955  MRN: [id number]    Date of Service: 2021    Evaluating Therapist: Krysten Barbosa PT      Referring Provider:  Fabricio Colorado MD     PT order : PT eval and treat      Room #:  613   DIAGNOSIS:  UTI . Recent falls . Re-assessment  due to R hemicolectomy   L 5th metatarsal fx 2021  - pt wears surgical shoe   PRECAUTIONS: falls, splinting        Social:  Pt lives with  Son  in a  2  floor plan with first floor set up,   2  steps and  1  rails to enter. Prior to admission pt walked with ww        Re-Evaluation  Date:  2021  Treatment      Short Term/ Long Term   Goals   Was pt agreeable to Eval/treatment? yes   yes     Does pt have pain? abd No c/o pain at rest and mild abdominal pain with movement and coughing.      Bed Mobility  Rolling:  min assist    Supine to sit:  mod assist   Sit to supine: NT   Scooting: min assist   Rolling: MIN A  Supine to sit: MOD A  Sit to supine: NA  Scooting: MIN A  SBA    Transfers Sit to stand: min assist   Stand to sit: min  assist   Stand pivot: NT  Sit to stand: MIN A  Stand to sit: MIN A  Stand pivot: MIN A with ww  SBA    Ambulation     30  feet with ww  with  min assist progressing Mod assist with fatigue  30 feet with ww MIN A  100  feet with  ww  with  SBA    Stair negotiation NT  NA, pt fatigued with amb  4  steps with  1  rail with  CGA    AM- PAC RAW score  15/ 24  16/24          BLE ROM is WFL  BLE Strength is grossly 4-/5 to 4/5  Balance: sitting EOB supervision and standing with ww SBA/MIN A    Patient education  Pt educated on bed mobility log roll technique to avoid strain on rectus abdominus and hand placement during transfers.      Patient response to education:   Pt verbalized understanding Pt demonstrated skill Pt requires further education in this area   yes Yes with VCs and assist yes     ASSESSMENT:    Comments:  Pt moved slow today due to some incisional pain with movement and multiple lines and tubes, from catheter, O2 line, PCA pump, and IV. Pt appeared to be more steady with amb today compared to yesterday's Re-assessment. She walked with slow gait speed and step too gait pattern, but had no significant LOB, just a little unsteady at times and required MIN A to correct. Treatment:  Patient practiced and was instructed in the following treatment:     Bed mobility, transfers, and gait with ww to improve functional strength and endurance. Pt was left sitting up in chair with call light left by patient. PLAN:    Pt is making good progress toward established Physical Therapy goals. Continue with physical therapy current plan of care. Time in  07:45  Time out  08:15    Total Treatment Time  30 minutes     Evaluation Time includes thorough review of current medical information, gathering information on past medical history/social history and prior level of function, completion of standardized testing/informal observation of tasks, assessment of data and education on plan of care and goals. CPT codes:  [] Low Complexity PT evaluation 61422  [] Moderate Complexity PT evaluation 14597  [] High Complexity PT evaluation 42013  [] PT Re-evaluation 37409  [] Gait training 73683 ** minutes  [] Manual therapy 90690 ** minutes  [x] Therapeutic activities 84673 30 minutes  [] Therapeutic exercises 78726 ** minutes  [] Neuromuscular reeducation 07763 ** minutes     Anthony Meng., P.T.   License Number: [id number]

## 2021-08-25 NOTE — PROGRESS NOTES
[facility]  [place]  Progress Note    SUBJECTIVE:  Chief Complaint   Patient presents with    Fall     frequent falls.  Nausea     Sitting up in bed asleep. In no distress. No fevers  PCA was stopped. Tolerating diet  Tolerating medications. Review of systems:  As stated above in the chief complaint, otherwise negative. Medications:  Scheduled Meds:   acetaminophen  1,000 mg Oral 3 times per day    enoxaparin  40 mg Subcutaneous Daily    amoxicillin-clavulanate  1 tablet Oral Q12H    atorvastatin  40 mg Oral Nightly    sodium chloride flush  10 mL IntraVENous 2 times per day    ferrous sulfate  325 mg Oral BID WC     Continuous Infusions:   lactated ringers 100 mL/hr at 21 0337    sodium chloride       PRN Meds:ketorolac, oxyCODONE **OR** oxyCODONE, HYDROmorphone, sodium chloride flush, sodium chloride, polyethylene glycol    OBJECTIVE:  BP (!) 146/72   Pulse 62   Temp 97.8 °F (36.6 °C) (Oral)   Resp 18   Ht 5' 3\" (1.6 m)   Wt 158 lb 4.8 oz (71.8 kg)   LMP 2011   SpO2 (!) 88%   BMI 28.04 kg/m²   Temp  Av.8 °F (36.6 °C)  Min: 97.7 °F (36.5 °C)  Max: 97.8 °F (36.6 °C)  Constitutional: The patient is lying in bed. Resting comfortably. Skin: Warm and dry. No rashes were noted. HEENT: Round and reactive pupils. Moist mucous membranes. No ulcerations or thrush. Neck: Supple to movements. Chest: No use of accessory muscles to breathe. Symmetrical expansion. No wheezing, crackles or rhonchi. Cardiovascular: S1 and S2 are rhythmic and regular. No murmurs appreciated. Abdomen: hypoactive bowel sounds. Tender, softly distended. KALIE dressing to the midline - no drainage noted. Extremities: trace edema.   Lines: peripheral    Laboratory and Tests Review:  Lab Results   Component Value Date    WBC 8.6 2021    WBC 5.7 2021    WBC 5.2 2021    HGB 8.4 (L) 2021    HCT 29.5 (L) 2021    MCV 73.9 (L) 2021  08/25/2021     Lab Results   Component Value Date    NEUTROABS 7.41 (H) 08/25/2021    NEUTROABS 3.84 08/23/2021    NEUTROABS 3.85 08/22/2021     No results found for: CRPHS  Lab Results   Component Value Date    ALT <5 08/16/2021    AST 13 08/16/2021    ALKPHOS 110 (H) 08/16/2021    BILITOT 0.7 08/16/2021     Lab Results   Component Value Date     08/25/2021    K 4.5 08/25/2021     08/25/2021    CO2 29 08/25/2021    BUN 11 08/25/2021    CREATININE 0.8 08/25/2021    CREATININE 0.8 08/23/2021    CREATININE 0.7 08/22/2021    GFRAA >60 08/25/2021    LABGLOM >60 08/25/2021    GLUCOSE 124 08/25/2021    PROT 6.9 08/16/2021    LABALBU 3.3 08/16/2021    CALCIUM 9.1 08/25/2021    BILITOT 0.7 08/16/2021    ALKPHOS 110 08/16/2021    AST 13 08/16/2021    ALT <5 08/16/2021     Lab Results   Component Value Date    CRP 2.1 (H) 08/20/2021     Lab Results   Component Value Date    SEDRATE 35 (H) 08/20/2021     Radiology:  CT abd/pelvis   Impression:        1.  Fair large cecal neoplasm causing encasement of the central lumen of the   bowel in the cecal area, with the extension and encasement into the terminal   ileum and the proximal ascending colon. 2.  Presence of satellite mesenteric adenopathy adjacent the cecal area, most   likely metastatic. 3.  Some inflammatory reaction surrounds the pericecal spaces.  The could not   conspicuously identified the appendix.  The small well-contained air density   seen peripherally to the cecum which can represent remnant of an appendix   also involved by cecal mass or a small area of well contained localized   perforation (images: A2/34; A3/90 A1/121). .        Microbiology:   Blood cultures 8/16/2021: Clostridium septicum 2/4 bottles    Urine culture 8/20/2021: Negative so far  Rapid SARS-CoV-2: Not detected  8/20/2021 blood cultures: negative so far  MRSA screening: negative       ASSESSMENT:  · Fever. Resolved   · Large cecal neoplasm.   Status post colonoscopy with biopsy and tattooing 8/22/2021  · Status post right hemicolectomy 8/23/2021  · Anaerobic gram-positive eladio bacteremia associated to cecal neoplasm  · Hypokalemia  · History of recent CVA     PLAN:  · Continue Augmentin until tomorrow - day 9/10  · Surgery following - status post right hemicolectomy    · We will follow with you    Yudith Luna, LEYDA - CNP  11:27 AM  8/25/2021     Patient seen and examined. I had a face to face encounter with the patient. Agree with exam.  Assessment and plan as outlined above and directed by me. Addition and corrections were done as deemed appropriate. My exam and plan include: The patient is doing well. She is about to complete 10 days of antibiotics. Repeat blood cultures are negative. Midline incision looks clean and is covered.     Dorcas Faust MD  8/25/2021  1:25 PM

## 2021-08-25 NOTE — PROGRESS NOTES
[facility]   Progress Note    Admitting Date and Time: 8/16/2021  5:28 PM  Admit Dx: UTI (urinary tract infection) [N39.0]  Generalized weakness [R53.1]  Ambulatory dysfunction [R26.2]  Urinary tract infection without hematuria, site unspecified [N39.0]    Subjective:    Patient was admitted with UTI (urinary tract infection) [N39.0]  Generalized weakness [R53.1]  Ambulatory dysfunction [R26.2]  Urinary tract infection without hematuria, site unspecified [N39.0]. POD # 2 right hemicolectomy, doing very well. Has been up with PT, tolerating clears although tells me she is not passing gas yet. No nausea, no abdominal distension. ROS: denies fever, chills, cp, sob, n/v, HA unless stated above.      acetaminophen  1,000 mg Oral 3 times per day    enoxaparin  40 mg Subcutaneous Daily    amoxicillin-clavulanate  1 tablet Oral Q12H    atorvastatin  40 mg Oral Nightly    sodium chloride flush  10 mL IntraVENous 2 times per day    ferrous sulfate  325 mg Oral BID WC     ketorolac, 15 mg, Q6H PRN  oxyCODONE, 5 mg, Q4H PRN   Or  oxyCODONE, 10 mg, Q4H PRN  HYDROmorphone, 0.5 mg, Q3H PRN  sodium chloride flush, 10 mL, PRN  sodium chloride, 25 mL, PRN  polyethylene glycol, 17 g, Daily PRN         Objective:    BP (!) 146/72   Pulse 62   Temp 97.8 °F (36.6 °C) (Oral)   Resp 18   Ht 5' 3\" (1.6 m)   Wt 158 lb 4.8 oz (71.8 kg)   LMP 02/14/2011   SpO2 (!) 88%   BMI 28.04 kg/m²   General Appearance: alert and oriented to person, place and time, in no acute distress   Skin: warm and dry, no rash or erythema  Head: normocephalic and atraumatic  Neck: supple and non-tender   Pulmonary/Chest: clear to auscultation bilaterally  Cardiovascular: normal rate, regular rhythm, normal S1 and S2, no murmurs  Abdomen: midline incision covered, no redness, belly soft, no guarding, very tender near incision  Extremities: no edema    Recent Labs     08/23/21  0425 08/25/21  0510    140   K 3.6 4.5    104   CO2 28 29   BUN 5* 11   CREATININE 0.8 0.8   GLUCOSE 94 124*   CALCIUM 8.3* 9.1       Recent Labs     08/23/21  0425 08/25/21  0510   WBC 5.7 8.6   RBC 3.78 3.99   HGB 7.8* 8.4*   HCT 28.0* 29.5*   MCV 74.1* 73.9*   MCH 20.6* 21.1*   MCHC 27.9* 28.5*   RDW 23.7* 24.8*    276   MPV 9.0 8.6       Radiology:   CT CHEST W CONTRAST   Final Result   Cardiomegaly with coronary artery calcification and moderate pericardial   effusion. COPD with pleural based nodular density in the right lower lobe probably   scarring. Malignancy is less likely. 3-5 mm additional nodules are noted in   the right lower lobe. Surveillance according to Fleischner society   guidelines are recommended. RECOMMENDATIONS:   Nodular density in the right lung base. The surveillance according to   Fleischner society guidelines is recommended         US DUP LOWER EXTREMITIES BILATERAL VENOUS   Final Result   No evidence of DVT in either lower extremity. CT ABDOMEN PELVIS W IV CONTRAST Additional Contrast? Oral   Final Result   1. Fair large cecal neoplasm causing encasement of the central lumen of the   bowel in the cecal area, with the extension and encasement into the terminal   ileum and the proximal ascending colon. 2.  Presence of satellite mesenteric adenopathy adjacent the cecal area, most   likely metastatic. 3.  Some inflammatory reaction surrounds the pericecal spaces. The could not   conspicuously identified the appendix. The small well-contained air density   seen peripherally to the cecum which can represent remnant of an appendix   also involved by cecal mass or a small area of well contained localized   perforation (images: A2/34; A3/90 A1/121). .         CT HEAD WO CONTRAST   Final Result   No acute intracranial abnormality. Age-related loss of brain volume and   chronic periventricular and subcortical white matter ischemic changes.       Multifocal chronic infarcts as described. Multiple chronic small basal ganglia and corona radiata lacunar infarcts. XR CHEST PORTABLE   Final Result   No acute cardiopulmonary abnormality. XR HIP BILATERAL W AP PELVIS (2 VIEWS)   Final Result   1. No acute abnormality. 2. Calcified fibroid uterus. Assessment:    Principal Problem:    Malignant neoplasm of ascending colon (HCC)  Active Problems:    UTI (urinary tract infection)  Resolved Problems:    * No resolved hospital problems. *      Plan:    1.  Mental status changes  Due to sepsis - bacteremia, and large cecal tumor.     Also had right parietal stroke in May, symptoms seem to have been mostly mental status related and not motor.     I am assuming patient at baseline at this time, although would need to confirm in person with family member.     2.  Sepsis/bateremia -   Thought initially secondary to UTI. Urine culture negative     2/2 blood cultures positive for gram variable rods - clostridium septicum  Antibiotics changed to ertapenem and CT abdomen shows large cecal mass likely a neoplasm with extension and encasement of the terminal ileum and proximal ascending colon. Antibiotics changed to augmentin now by ID< and will stop tomorrow after 10 total days. No fever.     3.  Anemia  Hgb 8.2 on admission, but had IVF and dropped to 6.2 - transfused one unit and now up to 7.2, transfused up to hgb 8.4, today continues to be 8.4.     No bleeding seen.  However iron level is very low.  Received iv iron X 2 as well early in admission. Watch hgb daily. Likely bleeding chronically due to cecal mass.     Stopping eliquis for now.     4.  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 Patient should be on eliquis.   Also on plavix and if embolic I don't see this as additive - will stop.     5.  History of DVT  Stopping eliquis.     Lower extremities now negative for DVT. Will watch, patient at very high risk for recurrence, but no immediate indication for IVC filter.     Spoke with surgery, prophylactic dose lovenox post op and then will transition back to eliquis when able from surgical stability standpoint. Will ask surgery if it is ok to restart eliquis.     6.  Large cecal mass  POD #2 - open hemicolectomy with ileocolic anastomosis.     CEA very high, almost certainly colon cancer. No obvious metastases, although lymphatic spread seen on CT scan.     Will need to see oncology after pathology confirmed.     I did speak to surgery, will stop PCA pump today.     Medically stable otherwise at this time.      Electronically signed by Linsey Hector MD on 8/25/2021 at 11:42 AM

## 2021-08-26 LAB
ACANTHOCYTES: ABNORMAL
ANION GAP SERPL CALCULATED.3IONS-SCNC: 10 MMOL/L (ref 7–16)
ANISOCYTOSIS: ABNORMAL
BASOPHILS ABSOLUTE: 0.02 E9/L (ref 0–0.2)
BASOPHILS RELATIVE PERCENT: 0.4 % (ref 0–2)
BUN BLDV-MCNC: 7 MG/DL (ref 6–23)
CALCIUM SERPL-MCNC: 8.4 MG/DL (ref 8.6–10.2)
CHLORIDE BLD-SCNC: 102 MMOL/L (ref 98–107)
CO2: 28 MMOL/L (ref 22–29)
CREAT SERPL-MCNC: 0.8 MG/DL (ref 0.5–1)
EOSINOPHILS ABSOLUTE: 0.05 E9/L (ref 0.05–0.5)
EOSINOPHILS RELATIVE PERCENT: 1.1 % (ref 0–6)
GFR AFRICAN AMERICAN: >60
GFR NON-AFRICAN AMERICAN: >60 ML/MIN/1.73
GLUCOSE BLD-MCNC: 92 MG/DL (ref 74–99)
HCT VFR BLD CALC: 30.7 % (ref 34–48)
HEMOGLOBIN: 8.7 G/DL (ref 11.5–15.5)
HYPOCHROMIA: ABNORMAL
IMMATURE GRANULOCYTES #: 0.01 E9/L
IMMATURE GRANULOCYTES %: 0.2 % (ref 0–5)
LYMPHOCYTES ABSOLUTE: 1.13 E9/L (ref 1.5–4)
LYMPHOCYTES RELATIVE PERCENT: 24.3 % (ref 20–42)
MCH RBC QN AUTO: 21.3 PG (ref 26–35)
MCHC RBC AUTO-ENTMCNC: 28.3 % (ref 32–34.5)
MCV RBC AUTO: 75.2 FL (ref 80–99.9)
MONOCYTES ABSOLUTE: 0.31 E9/L (ref 0.1–0.95)
MONOCYTES RELATIVE PERCENT: 6.7 % (ref 2–12)
NEUTROPHILS ABSOLUTE: 3.13 E9/L (ref 1.8–7.3)
NEUTROPHILS RELATIVE PERCENT: 67.3 % (ref 43–80)
OVALOCYTES: ABNORMAL
PDW BLD-RTO: 25.6 FL (ref 11.5–15)
PLATELET # BLD: 271 E9/L (ref 130–450)
PMV BLD AUTO: 8.6 FL (ref 7–12)
POIKILOCYTES: ABNORMAL
POLYCHROMASIA: ABNORMAL
POTASSIUM REFLEX MAGNESIUM: 3.9 MMOL/L (ref 3.5–5)
RBC # BLD: 4.08 E12/L (ref 3.5–5.5)
SCHISTOCYTES: ABNORMAL
SODIUM BLD-SCNC: 140 MMOL/L (ref 132–146)
STOMATOCYTES: ABNORMAL
TEAR DROP CELLS: ABNORMAL
WBC # BLD: 4.7 E9/L (ref 4.5–11.5)

## 2021-08-26 PROCEDURE — 6370000000 HC RX 637 (ALT 250 FOR IP): Performed by: STUDENT IN AN ORGANIZED HEALTH CARE EDUCATION/TRAINING PROGRAM

## 2021-08-26 PROCEDURE — 2580000003 HC RX 258: Performed by: STUDENT IN AN ORGANIZED HEALTH CARE EDUCATION/TRAINING PROGRAM

## 2021-08-26 PROCEDURE — 99233 SBSQ HOSP IP/OBS HIGH 50: CPT | Performed by: INTERNAL MEDICINE

## 2021-08-26 PROCEDURE — 80048 BASIC METABOLIC PNL TOTAL CA: CPT

## 2021-08-26 PROCEDURE — 97535 SELF CARE MNGMENT TRAINING: CPT

## 2021-08-26 PROCEDURE — 6360000002 HC RX W HCPCS: Performed by: INTERNAL MEDICINE

## 2021-08-26 PROCEDURE — 85025 COMPLETE CBC W/AUTO DIFF WBC: CPT

## 2021-08-26 PROCEDURE — 1200000000 HC SEMI PRIVATE

## 2021-08-26 PROCEDURE — 2580000003 HC RX 258: Performed by: INTERNAL MEDICINE

## 2021-08-26 PROCEDURE — 6360000002 HC RX W HCPCS: Performed by: STUDENT IN AN ORGANIZED HEALTH CARE EDUCATION/TRAINING PROGRAM

## 2021-08-26 PROCEDURE — 36415 COLL VENOUS BLD VENIPUNCTURE: CPT

## 2021-08-26 RX ADMIN — SODIUM CHLORIDE 100 MG: 9 INJECTION, SOLUTION INTRAVENOUS at 21:01

## 2021-08-26 RX ADMIN — ACETAMINOPHEN 1000 MG: 500 TABLET ORAL at 21:03

## 2021-08-26 RX ADMIN — FERROUS SULFATE TAB 325 MG (65 MG ELEMENTAL FE) 325 MG: 325 (65 FE) TAB at 18:35

## 2021-08-26 RX ADMIN — SODIUM CHLORIDE, POTASSIUM CHLORIDE, SODIUM LACTATE AND CALCIUM CHLORIDE 100 ML: 600; 310; 30; 20 INJECTION, SOLUTION INTRAVENOUS at 06:09

## 2021-08-26 RX ADMIN — FERROUS SULFATE TAB 325 MG (65 MG ELEMENTAL FE) 325 MG: 325 (65 FE) TAB at 09:32

## 2021-08-26 RX ADMIN — AMOXICILLIN AND CLAVULANATE POTASSIUM 1 TABLET: 875; 125 TABLET, FILM COATED ORAL at 14:10

## 2021-08-26 RX ADMIN — Medication 10 ML: at 21:04

## 2021-08-26 RX ADMIN — AMOXICILLIN AND CLAVULANATE POTASSIUM 1 TABLET: 875; 125 TABLET, FILM COATED ORAL at 00:42

## 2021-08-26 RX ADMIN — SODIUM CHLORIDE, POTASSIUM CHLORIDE, SODIUM LACTATE AND CALCIUM CHLORIDE: 600; 310; 30; 20 INJECTION, SOLUTION INTRAVENOUS at 18:39

## 2021-08-26 RX ADMIN — ACETAMINOPHEN 1000 MG: 500 TABLET ORAL at 06:07

## 2021-08-26 RX ADMIN — SODIUM CHLORIDE 25 MG: 9 INJECTION, SOLUTION INTRAVENOUS at 18:36

## 2021-08-26 RX ADMIN — ENOXAPARIN SODIUM 40 MG: 40 INJECTION SUBCUTANEOUS at 09:32

## 2021-08-26 RX ADMIN — ATORVASTATIN CALCIUM 40 MG: 40 TABLET, FILM COATED ORAL at 21:03

## 2021-08-26 RX ADMIN — ACETAMINOPHEN 1000 MG: 500 TABLET ORAL at 14:09

## 2021-08-26 ASSESSMENT — PAIN SCALES - GENERAL
PAINLEVEL_OUTOF10: 0
PAINLEVEL_OUTOF10: 9
PAINLEVEL_OUTOF10: 0

## 2021-08-26 ASSESSMENT — PAIN DESCRIPTION - PAIN TYPE: TYPE: SURGICAL PAIN

## 2021-08-26 ASSESSMENT — PAIN DESCRIPTION - DESCRIPTORS: DESCRIPTORS: ACHING;SORE

## 2021-08-26 ASSESSMENT — PAIN DESCRIPTION - PROGRESSION: CLINICAL_PROGRESSION: GRADUALLY WORSENING

## 2021-08-26 ASSESSMENT — PAIN DESCRIPTION - LOCATION: LOCATION: ABDOMEN

## 2021-08-26 ASSESSMENT — PAIN DESCRIPTION - FREQUENCY: FREQUENCY: INTERMITTENT

## 2021-08-26 ASSESSMENT — PAIN - FUNCTIONAL ASSESSMENT: PAIN_FUNCTIONAL_ASSESSMENT: PREVENTS OR INTERFERES SOME ACTIVE ACTIVITIES AND ADLS

## 2021-08-26 ASSESSMENT — PAIN DESCRIPTION - ONSET: ONSET: ON-GOING

## 2021-08-26 ASSESSMENT — PAIN DESCRIPTION - ORIENTATION: ORIENTATION: RIGHT;LEFT

## 2021-08-26 NOTE — CONSULTS
[facility]  Hematology/Oncology  Consult      Patient Name: Vamsi Brooks  YOB: 1955  PCP: Sourav Montoya DO   Referring Provider:      Reason for Consultation:   Chief Complaint   Patient presents with    Fall     frequent falls.  Nausea        History of Present Illness: This is a 59-year-old female with a PMH HTN, HLD, CVA on Eliquis, and DVT who presented to the ED initially on 8/16/21 for generalized weakness and AMS after a fall. She has also had a 10lb weight loss last month due to reduced appetite. CT head showed no acute intracranial abnormality but showed multiple chronic infarcts. She was also found to be septic in the emergency room with fever of 104 so ID was consulted for suspicion of cystitis and is currently on Augmentin. She then had a CT-AP was completed to look for intraabdominal source of infection, which showed a large cecal mass with extension into and encasement of the terminal ileum. Hb has been low throughout admission 6.2 on 8/18 requiring transfusion of prbc. General surgery was consulted and patient had a colonoscopy with biopsy on 8/22/2021. She then underwent an open right hemicolectomy on 8/23/2021. Pathology from cecal mass is consistent with invasive moderately differentiated adenocarcinoma. Consultation for newly diagnosed colon adenocarcinoma.         Diagnostic Data:     Past Medical History:   Diagnosis Date    Acute gastroenteritis 03/2010    Cerebral artery occlusion with cerebral infarction ([address]     Chronic back pain     Headache(784.0)     Hyperlipidemia 03/2010    Hypertension     Obesity     Osteoarthritis     Tobacco abuse        Patient Active Problem List    Diagnosis Date Noted    Malignant neoplasm of ascending colon ([address] 08/22/2021    UTI (urinary tract infection) 08/17/2021    Acute deep vein thrombosis (DVT) of calf muscle vein of left lower extremity ([address] 05/03/2021    PFO (patent foramen ovale) 05/03/2021    Cerebrovascular accident (CVA) due to embolism of right middle cerebral artery ([address]     Encephalopathy acute 04/27/2021    Stroke-like symptoms 04/26/2021    Tobacco abuse     Hyperlipidemia         Past Surgical History:   Procedure Laterality Date    COLECTOMY Right 8/23/2021    OPEN RIGHT HEMICOLECTOMY performed by Carroll Klein MD at [address] [place] [address] ENDOSCOPY N/A 8/22/2021    COLONOSCOPY performed by Shakir Mackey MD at [facility] OR       Family History  Family History   Problem Relation Age of Onset    Diabetes Sister     High Blood Pressure Sister     Cancer Sister         Thryoid    Diabetes Sister     Heart Disease Sister 28        Heart Failure    Cancer Brother     Diabetes Brother     Heart Disease Brother     High Blood Pressure Brother     High Cholesterol Brother        Social History    TOBACCO:   reports that she has been smoking cigarettes. She has a 48.00 pack-year smoking history. She has never used smokeless tobacco.  ETOH:   reports no history of alcohol use. Home Medications  Prior to Admission medications    Medication Sig Start Date End Date Taking? Authorizing Provider   atorvastatin (LIPITOR) 40 MG tablet Take 1 tablet by mouth nightly 7/14/21  Yes Aleena Shankar DO   clopidogrel (PLAVIX) 75 MG tablet Take 1 tablet by mouth daily 7/14/21  Yes Aleena Shankar DO   lisinopril (PRINIVIL;ZESTRIL) 5 MG tablet Take 1 tablet by mouth daily 7/14/21  Yes Aleena Shankar DO   Incontinence Supply Disposable MISC Large size 7/14/21 June DO Vonnie   Blood Pressure KIT 1 each by Does not apply route daily 5/10/21   Aleena Shankar DO       Allergies  No Known Allergies    Review of Systems:     Constitutional:  No fever chills or rigors.  Eyes: No changes in vision, discharge, or pain   ENT: No Headaches, hearing loss or vertigo. No mouth sores or sore throat. No change in taste or smell.  Cardiovascular: No chest discomfort, dyspnea on exertion, palpitations or loss of consciousness. or phlebitis.  Respiratory: Has no cough or wheezing, Has no sputum production. Has no hemoptysis, Has no pleuritic pain, .  Gastrointestinal: No abdominal pain, appetite loss, blood in stools. No change in bowel habits. No hematemesis    Genitourinary: Patient acknowledges no dysuria, trouble voiding, or hematuria. No nocturia or increased frequency.  Musculoskeletal: No gait disturbance, weakness or joint complaints.  Integumentary: No rash or pruritis.  Neurological: No headache, diplopia, change in muscle strength, numbness or tingling. No change in gait, balance, coordination, mood, affect, memory, mentation, behavior.  Psychiatric: No anxiety, or depression.  Endocrine: No temperature intolerance. No excessive thirst, fluid intake, or urination. No tremor.  Hematologic/Lymphatic: No abnormal bruising or bleeding, blood clots or swollen lymph nodes.  Allergic/Immunologic: No nasal congestion or hives. Objective  BP (!) 152/67   Pulse 69   Temp 97.8 °F (36.6 °C) (Oral)   Resp 18   Ht 5' 3\" (1.6 m)   Wt 158 lb 4.8 oz (71.8 kg)   LMP 02/14/2011   SpO2 93%   BMI 28.04 kg/m²     Physical Exam:   Performance Status:  General: AAO to person, place, time, in no acute distress,   Head and neck : PERRLA, EOMI . Sclera non icteric. Oropharynx : Clear  Neck: no JVD,  no adenopathy, no carotid bruit  Heart: Regular rate and regular rhythm, no murmur  Lungs: Clear to auscultation   Extremities: No edema,no cyanosis, no clubbing. Abdomen: Soft, non-tender;no masses, no organomegaly. Healing surgical wound. Skin:  No rash. Neurologic:Cranial nerves grossly intact. No focal motor or sensory deficits .     Recent Laboratory Data-   Lab Results   Component Value Date    WBC 4.7 08/26/2021    HGB 8.7 (L) 08/26/2021    HCT 30.7 (L) 08/26/2021    MCV 75.2 (L) 08/26/2021     08/26/2021 LYMPHOPCT 8.1 (L) 08/25/2021    RBC 4.08 08/26/2021    MCH 21.3 (L) 08/26/2021    MCHC 28.3 (L) 08/26/2021    RDW 25.6 (H) 08/26/2021    NEUTOPHILPCT 86.6 (H) 08/25/2021    MONOPCT 4.3 08/25/2021    BASOPCT 0.1 08/25/2021    NEUTROABS 7.41 (H) 08/25/2021    LYMPHSABS 0.69 (L) 08/25/2021    MONOSABS 0.37 08/25/2021    EOSABS 0.00 (L) 08/25/2021    BASOSABS 0.01 08/25/2021       Lab Results   Component Value Date     08/25/2021    K 4.5 08/25/2021     08/25/2021    CO2 29 08/25/2021    BUN 11 08/25/2021    CREATININE 0.8 08/25/2021    GLUCOSE 124 (H) 08/25/2021    CALCIUM 9.1 08/25/2021    PROT 6.9 08/16/2021    LABALBU 3.3 (L) 08/16/2021    BILITOT 0.7 08/16/2021    ALKPHOS 110 (H) 08/16/2021    AST 13 08/16/2021    ALT <5 08/16/2021    LABGLOM >60 08/25/2021    GFRAA >60 08/25/2021       Lab Results   Component Value Date    IRON 10 (L) 08/18/2021    TIBC 173 (L) 08/18/2021    FERRITIN 66 08/18/2021           Radiology-    XR HIP BILATERAL W AP PELVIS (2 VIEWS)    Result Date: 8/16/2021  EXAMINATION: ONE XRAY VIEW OF THE PELVIS AND TWO XRAY VIEWS OF EACH OF THE BILATERAL HIPS 8/16/2021 5:46 pm COMPARISON: None. HISTORY: ORDERING SYSTEM PROVIDED HISTORY: fall eval for hip fracture TECHNOLOGIST PROVIDED HISTORY: Reason for exam:->fall eval for hip fracture FINDINGS: The pelvic bones are intact without fracture or focal lesion. The sacroiliac joints and the pubic symphysis are unremarkable. A calcified fibroid is seen in the left pelvis. Radiographs of the bilateral hips reveal no evidence of fracture or joint dislocation. 1. No acute abnormality. 2. Calcified fibroid uterus.      CT HEAD WO CONTRAST    Result Date: 8/16/2021  EXAMINATION: CT OF THE HEAD WITHOUT CONTRAST  8/16/2021 4:52 pm TECHNIQUE: CT of the head was performed without the administration of intravenous contrast. Dose modulation, iterative reconstruction, and/or weight based adjustment of the mA/kV was utilized to reduce the radiation dose to as low as reasonably achievable. COMPARISON: April 26 HISTORY: ORDERING SYSTEM PROVIDED HISTORY: fall eval for head injury TECHNOLOGIST PROVIDED HISTORY: Has a \"code stroke\" or \"stroke alert\" been called? ->No Reason for exam:->fall eval for head injury Decision Support Exception - unselect if not a suspected or confirmed emergency medical condition->Emergency Medical Condition (MA) FINDINGS: BRAIN/VENTRICLES: There is no acute intracranial hemorrhage, mass effect or midline shift. No abnormal extra-axial fluid collection. The gray-white differentiation is maintained without evidence of an acute infarct. There is prominence of the ventricles and sulci due to global parenchymal volume loss. There are nonspecific areas of hypoattenuation within the periventricular and subcortical white matter, which likely represent chronic microvascular ischemic change. Chronic infarction in the right temporoparietal region. Chronic infarction with encephalomalacia in the left cerebellar hemisphere, right occipital lobe, right frontal lobe. Multiple chronic basal ganglia and corona radiata lacunar infarcts. ORBITS: The visualized portion of the orbits demonstrate no acute abnormality. SINUSES: The visualized paranasal sinuses and mastoid air cells demonstrate no acute abnormality. SOFT TISSUES/SKULL: No acute abnormality of the visualized skull or soft tissues. No acute intracranial abnormality. Age-related loss of brain volume and chronic periventricular and subcortical white matter ischemic changes. Multifocal chronic infarcts as described. Multiple chronic small basal ganglia and corona radiata lacunar infarcts. CT CHEST W CONTRAST    Result Date: 8/22/2021  EXAMINATION: CT OF THE CHEST WITH CONTRAST 8/22/2021 11:23 am TECHNIQUE: CT of the chest was performed with the administration of intravenous contrast. Multiplanar reformatted images are provided for review.  Dose modulation, iterative reconstruction, and/or weight based adjustment of the mA/kV was utilized to reduce the radiation dose to as low as reasonably achievable. COMPARISON: None. HISTORY: ORDERING SYSTEM PROVIDED HISTORY: Metastatic workup TECHNOLOGIST PROVIDED HISTORY: Reason for exam:->Metastatic workup FINDINGS: There is cardiomegaly with moderate pericardial effusion. There is coronary artery calcification. Ascending thoracic aorta is ectatic measuring 3.5 cm. Nonenlarged mediastinal lymph nodes are present. There is centrilobular emphysema with atelectasis in the lung bases. 1.2 x 0.8 cm pleural based nodular density is identified in the superior right lower lobe abutting the major fissure. 3-5 mm nonspecific nodules are identified in the right lower lobe. No other dominant masses are identified in the chest.  Liver is fatty infiltrated. Gallbladder is normal.  There is splenomegaly with spleen measuring 14 cm. Atherosclerotic changes with plaque is identified in the visualized abdominal aorta. Moderate lymph nodes are identified in the upper abdomen. Cardiomegaly with coronary artery calcification and moderate pericardial effusion. COPD with pleural based nodular density in the right lower lobe probably scarring. Malignancy is less likely. 3-5 mm additional nodules are noted in the right lower lobe. Surveillance according to Fleischner society guidelines are recommended. RECOMMENDATIONS: Nodular density in the right lung base. The surveillance according to Fleischner society guidelines is recommended     CT ABDOMEN PELVIS W IV CONTRAST Additional Contrast? Oral    Result Date: 8/20/2021  EXAMINATION: CT OF THE ABDOMEN AND PELVIS WITH CONTRAST 8/20/2021 10:37 am TECHNIQUE: CT of the abdomen and pelvis was performed with the administration of intravenous contrast. Multiplanar reformatted images are provided for review.  Dose modulation, iterative reconstruction, and/or weight based adjustment of the mA/kV was utilized to reduce the radiation dose to as low as reasonably achievable. COMPARISON: . HISTORY: ORDERING SYSTEM PROVIDED HISTORY: Gram-positive anaerobe bacteremia. Probable intra-abdominal source of infection TECHNOLOGIST PROVIDED HISTORY: Additional Contrast?->Oral Reason for exam:->Gram-positive anaerobe bacteremia. Probable intra-abdominal source of infection FINDINGS: Fair large size 8 x 4 x 6.2 cm lobulated nonhomogeneous enhancing expansile mass lesion in the area of the cecum extending into the region of the ileocecal valve and the terminal ileum and also into the most proximal ascending colon above the ileocecal valve area. It causes significant deformity and expansion of the cecal area and significant encasement of the central lumen of the bowel in the affected segments. The oral contrast could passed through the tight lumen of the involved terminal ileum into the area of the cecum. There is no indication for more proximal small bowel obstruction. These cecal mass is surrounded by some stranding of the pericecal mesenteric and retroperitoneal spaces with satellite regional mild mesenteric adenopathy. There is also some increase in vascularity surround these lesion in the mesentery. The findings are compatible with a primary cecal neoplasm with involvement of the region of the ileocecal valve, terminal ileum and most proximal ascending colon. Could not conspicuously identified the appendix which appears to be also involved to the involvement of the cecum. The a small air densities seen more anterior could represent a remnant of an appendix or a discrete loculated There is no free intraperitoneal air. There is no stranding of fat planes of other segments of the omentum/mesentery and retroperitoneal spaces. The other segments of the colon have unremarkable appearance. There are normal size enhancement for the liver. The biliary tree is not dilated. The gallbladder is normally distended, it appears unremarkable.  There are normal size enhancement for the pancreas. Pancreatic duct is not dilated. The spleen is enlarged measures 14.8 x 6.9 cm but has uniform enhancement. Right adrenal glands not enlarged. There appears to be a 10 mm nodule in the left adrenal gland which is not characterized on this study. The a specific left adrenal protocol CT or MRI recommended the for characterization. Kidneys are preserved size and cortical thickness and the parenchymal enhancement. There is no renal calculus or obstruction. The There remarkable appearance for the bladder. There are calcified myomatous in the uterus. The patient had bilateral tubal ligation. There remarkable appearance for the right and left ovaries. There are calcified atheromatous changes in the abdominal aorta with a aneurysm of the infrarenal segment measuring 2.3 by 2.4 cm. There are preserved contrast runoff into visualized proximal right and left superficial and profunda femoral arteries. Lower lung bases demonstrate areas of peripheral atelectasis and some scarring in both bases. The a confluent scar is seen in the pleural interface between the right lower lobe and right middle lobe laterally. Spondylolysis of L5 pars interarticularis with small anterolisthesis of L5 relation to S1 with the encroachment of the neural foramina particular on the right-side. 1.  Fair large cecal neoplasm causing encasement of the central lumen of the bowel in the cecal area, with the extension and encasement into the terminal ileum and the proximal ascending colon. 2.  Presence of satellite mesenteric adenopathy adjacent the cecal area, most likely metastatic. 3.  Some inflammatory reaction surrounds the pericecal spaces. The could not conspicuously identified the appendix.   The small well-contained air density seen peripherally to the cecum which can represent remnant of an appendix also involved by cecal mass or a small area of well contained localized perforation (images: A2/34; A3/90 A1/121). .     XR CHEST PORTABLE    Result Date: 8/16/2021  EXAMINATION: ONE XRAY VIEW OF THE CHEST 8/16/2021 5:46 pm COMPARISON: 04/27/2021 HISTORY: ORDERING SYSTEM PROVIDED HISTORY: fall, eval for pneumonia TECHNOLOGIST PROVIDED HISTORY: Reason for exam:->fall, eval for pneumonia FINDINGS: The lungs are clear. The cardiomediastinal contour is unremarkable. No pneumothorax or pleural effusion is seen. The visualized bones are grossly intact. No acute cardiopulmonary abnormality. US DUP LOWER EXTREMITIES BILATERAL VENOUS    Result Date: 8/20/2021  EXAMINATION: DUPLEX VENOUS ULTRASOUND OF THE BILATERAL LOWER EXTREMITIES, 8/20/2021 8:29 pm TECHNIQUE: Duplex ultrasound using B-mode/gray scaled imaging and Doppler spectral analysis and color flow was obtained of the bilateral lower extremities. COMPARISON: Bilateral venous duplex from May 1, 2021 HISTORY: ORDERING SYSTEM PROVIDED HISTORY: History of DVT, now with cecal mass, evaluate for acute DVT TECHNOLOGIST PROVIDED HISTORY: Reason for exam:->History of DVT, now with cecal mass, evaluate for acute DVT What reading provider will be dictating this exam?->CRC FINDINGS: The visualized veins of the bilateral lower extremities are patent and free of echogenic thrombus. The veins demonstrate good compressibility with normal color flow study and spectral analysis. Previous thrombus in the left posterior tibial vein is not seen on this exam.     No evidence of DVT in either lower extremity. ASSESSMENT/PLAN :  62-year-old female   -HTN, HLD, CVA on Eliquis, and DVT   - Newly dx colon adenocarcinoma     -CT head showed no acute intracranial abnormality but showed multiple chronic infarcts. CT-AP was completed to look for intraabdominal source of infection, which showed a large cecal mass with extension into and encasement of the terminal ileum. -ID following for cystitis and sepsis and is currently on Augmentin.   -General surgery following.  S/p colonoscopy with biopsy on 8/22/2021. S/p open right hemicolectomy on 8/23/2021.     -Hb has been low throughout admission 6.2 on 8/18 requiring transfusion of prbc    **Pathology from cecal mass is consistent with invasive moderately differentiated adenocarcinoma.     -We will follow final path report from hemicolectomy for treatment recommendations. CT chest showed subcentimeter nodules. We will continue to monitor. -Iron deficiency anemia. Ferrlecit ordered.  -We will continue to follow.  -Thank you for the consult. LEYDA Pearson - CNP  Electronically signed 8/26/2021 at 8:50 AM   Patient seen and examined. Agree with CNP's assessment and plan. Note updated.    Sari Bauer MD

## 2021-08-26 NOTE — PROGRESS NOTES
Surgery:    Feels well. No significant abdominal pain. No bowel function yet. No nausea or vomiting tolerating her liquids. Trial her on full liquids. Continue ambulation. Await bowel function. Oncology consult appreciated. Okay for anticoagulation from my standpoint.       Flower Wheat MD  8/26/21  2:03 PM

## 2021-08-26 NOTE — PROGRESS NOTES
Britney Yeung Hospitalist   Progress Note    Admitting Date and Time: 8/16/2021  5:28 PM  Admit Dx: UTI (urinary tract infection) [N39.0]  Generalized weakness [R53.1]  Ambulatory dysfunction [R26.2]  Urinary tract infection without hematuria, site unspecified [N39.0]    Subjective: Admitted on June due to, came with weakness, patient with right parietal CVA, May 21, recent fall, impression on admission of cystitis as well as SIRS. Urine culture negative. Evidence of bacteremia. Gram variable rods. Also iron deficiency anemia. ID treated with Rocephin. Changed to ertapenem on 20th. Now changed to Augmentin. Plan till September 10. Patient with large cecal mass. CEA 78. As per surgery possible carcinomatosis. Surgery did total colonoscopy on 22nd with cold forcep biopsy and tattooing, postop diagnosis of cecal and ascending colon cancer spanning 10 cm. Patient had a open right hemicolectomy with omental pedicle flap on 23rd. patient did have abnormal bubble study in the past showing PFO, noted on ADAM. Patient seen by hematology, pathology from surgical specimen showing invasive moderately differentiated adenocarcinoma. Patient was admitted with UTI (urinary tract infection) [N39.0]  Generalized weakness [R53.1]  Ambulatory dysfunction [R26.2]  Urinary tract infection without hematuria, site unspecified [N39.0]. Patient awake, alert, resting at this time, well-oriented, does communicate well. Does feel better. Denies any complaints. Per RN: No new issues. ROS: denies fever, chills, cp, sob, n/v, HA unless stated above.      acetaminophen  1,000 mg Oral 3 times per day    enoxaparin  40 mg Subcutaneous Daily    amoxicillin-clavulanate  1 tablet Oral Q12H    atorvastatin  40 mg Oral Nightly    sodium chloride flush  10 mL IntraVENous 2 times per day    ferrous sulfate  325 mg Oral BID WC     ketorolac, 15 mg, Q6H PRN  oxyCODONE, 5 mg, Q4H PRN   Or  oxyCODONE, 10 mg, Q4H PRN  HYDROmorphone, 0.5 mg, Q3H PRN  sodium chloride flush, 10 mL, PRN  sodium chloride, 25 mL, PRN  polyethylene glycol, 17 g, Daily PRN         Objective:    BP (!) 152/67   Pulse 69   Temp 97.8 °F (36.6 °C) (Oral)   Resp 18   Ht 5' 3\" (1.6 m)   Wt 158 lb 4.8 oz (71.8 kg)   LMP 02/14/2011   SpO2 93%   BMI 28.04 kg/m²   General Appearance: alert and oriented to person, place and time, well-developed and well-nourished, in no acute distress  Skin: warm and dry, no rash or erythema  Head: normocephalic and atraumatic  Eyes: pupils equal, round, and reactive to light, extraocular eye movements intact, conjunctivae normal  ENT: tympanic membrane, external ear and ear canal normal bilaterally, oropharynx clear and moist with normal mucous membranes  Neck: neck supple and non tender without mass, no thyromegaly or thyroid nodules, no cervical lymphadenopathy   Pulmonary/Chest: clear to auscultation bilaterally- no wheezes, rales or rhonchi, normal air movement, no respiratory distress  Cardiovascular: normal rate, normal S1 and S2, no gallops, intact distal pulses and no carotid bruits  Abdomen: soft, non-tender, non-distended, normal bowel sounds, no masses or organomegaly      Recent Labs     08/25/21  0510 08/26/21  0750    140   K 4.5 3.9    102   CO2 29 28   BUN 11 7   CREATININE 0.8 0.8   GLUCOSE 124* 92   CALCIUM 9.1 8.4*       Recent Labs     08/25/21  0510 08/26/21  0750   WBC 8.6 4.7   RBC 3.99 4.08   HGB 8.4* 8.7*   HCT 29.5* 30.7*   MCV 73.9* 75.2*   MCH 21.1* 21.3*   MCHC 28.5* 28.3*   RDW 24.8* 25.6*    271   MPV 8.6 8.6       Radiology:   CT CHEST W CONTRAST   Final Result   Cardiomegaly with coronary artery calcification and moderate pericardial   effusion. COPD with pleural based nodular density in the right lower lobe probably   scarring. Malignancy is less likely. 3-5 mm additional nodules are noted in   the right lower lobe.   Surveillance according to [employer] society   guidelines are recommended. RECOMMENDATIONS:   Nodular density in the right lung base. The surveillance according to   [employer] society guidelines is recommended         US DUP LOWER EXTREMITIES BILATERAL VENOUS   Final Result   No evidence of DVT in either lower extremity. CT ABDOMEN PELVIS W IV CONTRAST Additional Contrast? Oral   Final Result   1. Fair large cecal neoplasm causing encasement of the central lumen of the   bowel in the cecal area, with the extension and encasement into the terminal   ileum and the proximal ascending colon. 2.  Presence of satellite mesenteric adenopathy adjacent the cecal area, most   likely metastatic. 3.  Some inflammatory reaction surrounds the pericecal spaces. The could not   conspicuously identified the appendix. The small well-contained air density   seen peripherally to the cecum which can represent remnant of an appendix   also involved by cecal mass or a small area of well contained localized   perforation (images: A2/34; A3/90 A1/121). .         CT HEAD WO CONTRAST   Final Result   No acute intracranial abnormality. Age-related loss of brain volume and   chronic periventricular and subcortical white matter ischemic changes. Multifocal chronic infarcts as described. Multiple chronic small basal ganglia and corona radiata lacunar infarcts. XR CHEST PORTABLE   Final Result   No acute cardiopulmonary abnormality. XR HIP BILATERAL W AP PELVIS (2 VIEWS)   Final Result   1. No acute abnormality. 2. Calcified fibroid uterus. Assessment:    Principal Problem:    Malignant neoplasm of ascending colon (HCC)  Active Problems:    UTI (urinary tract infection)  Resolved Problems:    * No resolved hospital problems. *      Plan:  1. Mental status change, more secondary to sepsis/bacteremia, blood culture positive for gram variable rods, ID on board, patient remains on Augmentin.   Total 10 days antibiotics, on antibiotics since 22nd. 2. Sepsis initially suspected secondary to UTI, more related to the malignancy and bacteremia, patient did have colonoscopy, CEA level of 78, now she has undergone hemicolectomy. 3. Moderately differentiated adenocarcinoma of colon, patient seen by NP from oncology, input pending from the attending. 4. Patient with prior DVT, Eliquis was stopped more due to surgery, on Lovenox,  5. Pain control, will decrease Dilaudid frequency to every 4 For now. 6. DC planning, likely in a day or so after full input from oncology, also improvement in activity level, last AM PAC of 16/24. Will need [address] back on DC.  Also DC should be okay with surgery team.        Electronically signed by Rolly Mallory MD on 8/26/2021 at 9:11 AM

## 2021-08-26 NOTE — PROGRESS NOTES
Occupational Therapy  OT BEDSIDE TREATMENT NOTE      Date:2021  Patient Name: Christin Cobb  MRN: [id number]  : 1955  Room: [address] [place] [address]     Evaluating/Re-Evaluating OT: Wilbur Smith, OTR/JOCELYNN - [phone]     Referring Provider: Scotty Landau, MD  Specific Provider Orders/Date: \"OT eval and treat\" - 2021     Diagnosis: UTI (urinary tract infection) [N39.0], Generalized weakness [R53.1], Ambulatory dysfunction [R26.2], Urinary tract infection without hematuria, site unspecified [N39.0]      Surgery: Patient underwent OPEN RIGHT [address] on 2021. Pertinent Medical History: CVA, obesity, HTN, OA, chronic back pain       Precautions: fall risk, abdominal splinting     Assessment of Current Deficits:   [x]? Functional mobility             [x]?ADLs           [x]? Strength                  [x]? Cognition   [x]? Functional transfers           [x]? IADLs         [x]? Safety Awareness   [x]? Endurance   []? Fine Coordination              [x]? Balance      []? Vision/perception   [x]? Sensation     []? Gross Motor Coordination  []? ROM           []? Delirium                   []? Motor Control      OT PLAN OF CARE (UPDATED 2021)   OT POC is based on physician orders, patient diagnosis, and results of clinical assessment.   Frequency/Duration 2-5 days/week for additional 2 weeks PRN   Specific OT Treatment Interventions to Include:   * Instruction/training on adapted ADL techniques and AE recommendations to increase functional independence within precautions       * Training on energy conservation strategies, correct breathing pattern and techniques to improve independence/tolerance for self-care routine  * Functional transfer/mobility training/DME recommendations for increased independence, safety, and fall prevention  * Patient/Family education to increase follow through with safety techniques and functional independence  * Recommendation of environmental modifications for increased safety with functional transfers/mobility and ADLs  * Therapeutic exercise to improve motor endurance, ROM, and functional strength for ADLs/functional transfers  * Therapeutic activities to facilitate/challenge dynamic balance, stand tolerance for increased safety and independence with ADLs  * Neuro-muscular re-education: facilitation of righting/equilibrium reactions, midline orientation, scapular stability/mobility, normalization of muscle tone, and facilitation of volitional active controled movement     Recommended Adaptive Equipment: TBD     Home Living: Patient reported that she lives with her son (who works) in a two-floor home (few steps to enter); patient noted that she stays on the main living level where she sleeps on the couch. Bathroom Setup: tub - patient reported that she sits down in the tub to bathe  Equipment Owned: walker     Prior Level of Function (PLOF): Patient reported that she was independent with ADLs and some light, home-based IADLs (e.g. meal prep tasks); patient noted that her son assists with laundry and other IADLs. Patient reported that she was independent with functional mobility (with walker) prior to this hospitalization. Patient is a questionable historian; no family/caregiver present to verify information gathered. Driving: No     Pain Level: Pt did not report pain during this session. Cognition: Pt awake and alert. Min cues for safety awareness.      Functional Assessment:                  AM-PAC Daily Activity Raw Score: 17/24    Initial Eval Status  Date: 8/17/2021 Re-Eval Status:  Date: 8/24/2021 Treatment Status  Date: 8/26/21  Short Term Goals = Long Term Goals   Feeding SBA SBA   Independent   Grooming Mod A Mod A Setup seated.  SBA  (seated/standing at sink)   UB Dressing Min A to doff/don hospital gown. Min A   Setup   LB Dressing Max A needed to don socks and L surgical shoe.  Max A Pt able to cross over LE's to don and doff slipper socks without difficulty. Increased time.   SBA - with use of AE, as needed/appropriate   Bathing Max A  SBA for UB sponge bathing tasks while seated at EOB. Mod A   SBA - with use of AE/DME, as needed/appropriate   Toileting Max A for hygiene (while standing with walker). Patient with sauzo catheter currently. Max A  Patient with suazo catheter currently. Declined need to use the toilet   Supervision   Bed Mobility  Supine-to-Sit: Mod A  Sit-to-Supine: Min A  Supine-to-Sit: Mod A  Cues given to facilitate log rolling technique.       Functional Transfers Sit-to-Stands: Mod A   from EOB  Cues needed to maximize safety with functional transfers. Sit-to-Stand: Min A   from EOB SBA   Min cues for hand placement to increase safety technique.    Supervision   Functional Mobility Mod A   (with walker) for short distance within patient's room; increased time needed. Consistent assistance needed with management of walker, particularly during turns, to ensure safety. Min A to Mod A   (with walker) within patient's room; increased time needed. Assistance needed with management of walker at times, particularly during turns. Increased assistance needed as patient fatigued. CGA using w/w in room setting. Min cues for walker safety.   Supervision with functional mobility (with device, as needed/appropriate) in order to maximize independence with ADLs/IADLs and other functional tasks. Balance Sitting: Fair+  (at EOB)  Standing: Poor+  (with walker) for short distance within patient's room. Sitting: Fair+  (at EOB)  Standing: Fair- to Poor+  (with walker)    Fair + stand balance during ADL activity.   Fair+ dynamic standing balance during completion of ADLs/IADLs and other functional tasks. Activity Tolerance Limited Fair-  fair-  Patient will demonstrate Good understanding and consistent implementation of energy conservation techniques and work simplification techniques into ADL routines.           B UE Strength R UE: 4/5 grossly  L Shoulder: 3+/5   Distal L UE: 4-/5 R UE: 4/5 grossly  L Shoulder: 3+/5   Distal L UE: 4-/5   Patient will demonstrate 4/5 B UE strength in order to maximize independence with ADLs and functional transfers       Comments:  Pt sitting in the chair. Agreeable to therapy. Min cues for walker safety and increased awareness of environment to keep pt from running walker into objects. Pt demonstrated increase ability to complete ADL activity while seated. Limited stand tolerance for ADL activity. Remained in chair at end of the session. Education/treatment:  ADL retraining with facilitation of movement to increase self care skills. Therapeutic activity to address balance and endurance for ADL and transfers. Pt education of abdominal precautions and walker safety. · Pt has made  progress towards set goals.        Time In: 11:00   Time Out: 11:18      Min Units   Therapeutic Ex 53814     Therapeutic Activities 10319 6    ADL/Self Care 32342 12 1   Orthotic Management 73520     Neuro Re-Ed 83768     Non-Billable Time     TOTAL TIMED TREATMENT 18 300 [facility] JASMIN/L [id number]

## 2021-08-26 NOTE — PROGRESS NOTES
[address] [place] [address] [facility]  Progress Note    SUBJECTIVE:  Chief Complaint   Patient presents with    Fall     frequent falls.  Nausea     Sitting up in bed  In no distress. No fevers  Tolerating diet  Tolerating medications. Feels constipated    Review of systems:  As stated above in the chief complaint, otherwise negative. Medications:  Scheduled Meds:   acetaminophen  1,000 mg Oral 3 times per day    enoxaparin  40 mg Subcutaneous Daily    amoxicillin-clavulanate  1 tablet Oral Q12H    atorvastatin  40 mg Oral Nightly    sodium chloride flush  10 mL IntraVENous 2 times per day    ferrous sulfate  325 mg Oral BID WC     Continuous Infusions:   lactated ringers 50 mL/hr at 21 1307    sodium chloride       PRN Meds:ketorolac, oxyCODONE **OR** oxyCODONE, HYDROmorphone, sodium chloride flush, sodium chloride, polyethylene glycol    OBJECTIVE:  BP (!) 152/67   Pulse 69   Temp 97.8 °F (36.6 °C) (Oral)   Resp 18   Ht 5' 3\" (1.6 m)   Wt 158 lb 4.8 oz (71.8 kg)   LMP 2011   SpO2 93%   BMI 28.04 kg/m²   Temp  Av °F (36.7 °C)  Min: 97.8 °F (36.6 °C)  Max: 98.1 °F (36.7 °C)  Constitutional: The patient is lying in bed. Resting comfortably. Skin: Warm and dry. No rashes were noted. HEENT: Round and reactive pupils. Moist mucous membranes. No ulcerations or thrush. Neck: Supple to movements. Chest: No use of accessory muscles to breathe. Symmetrical expansion. No wheezing, crackles or rhonchi. Cardiovascular: S1 and S2 are rhythmic and regular. No murmurs appreciated. Abdomen: hypoactive bowel sounds. Tender, softly distended. KALIE dressing to the midline - no drainage noted. Extremities: trace edema.   Lines: peripheral    Laboratory and Tests Review:  Lab Results   Component Value Date    WBC 4.7 2021    WBC 8.6 2021    WBC 5.7 2021    HGB 8.7 (L) 2021    HCT 30.7 (L) 2021    MCV 75.2 (L) 2021     08/26/2021     Lab Results   Component Value Date    NEUTROABS 3.13 08/26/2021    NEUTROABS 7.41 (H) 08/25/2021    NEUTROABS 3.84 08/23/2021     No results found for: CRP  Lab Results   Component Value Date    ALT <5 08/16/2021    AST 13 08/16/2021    ALKPHOS 110 (H) 08/16/2021    BILITOT 0.7 08/16/2021     Lab Results   Component Value Date     08/26/2021    K 3.9 08/26/2021     08/26/2021    CO2 28 08/26/2021    BUN 7 08/26/2021    CREATININE 0.8 08/26/2021    CREATININE 0.8 08/25/2021    CREATININE 0.8 08/23/2021    GFRAA >60 08/26/2021    LABGLOM >60 08/26/2021    GLUCOSE 92 08/26/2021    PROT 6.9 08/16/2021    LABALBU 3.3 08/16/2021    CALCIUM 8.4 08/26/2021    BILITOT 0.7 08/16/2021    ALKPHOS 110 08/16/2021    AST 13 08/16/2021    ALT <5 08/16/2021     Lab Results   Component Value Date    CRP 2.1 (H) 08/20/2021     Lab Results   Component Value Date    SEDRATE 35 (H) 08/20/2021     Radiology:  CT abd/pelvis   Impression:        1.  Fair large cecal neoplasm causing encasement of the central lumen of the   bowel in the cecal area, with the extension and encasement into the terminal   ileum and the proximal ascending colon. 2.  Presence of satellite mesenteric adenopathy adjacent the cecal area, most   likely metastatic. 3.  Some inflammatory reaction surrounds the pericecal spaces.  The could not   conspicuously identified the appendix.  The small well-contained air density   seen peripherally to the cecum which can represent remnant of an appendix   also involved by cecal mass or a small area of well contained localized   perforation (images: A2/34; A3/90 A1/121). .        Microbiology:   Blood cultures 8/16/2021: Clostridium septicum 2/4 bottles    Urine culture 8/20/2021: Negative so far  Rapid SARS-CoV-2: Not detected  8/20/2021 blood cultures: negative so far  MRSA screening: negative       ASSESSMENT:  · Fever. Resolved   · Large cecal neoplasm.   Status post colonoscopy with biopsy and tattooing 8/22/2021  · Status post right hemicolectomy 8/23/2021  · Anaerobic gram-positive eladio bacteremia associated to cecal neoplasm  · Hypokalemia  · History of recent CVA     PLAN:  · Completed course of Augmentin  · Surgery following - status post right hemicolectomy    · We will follow with you    LEYDA Arnold CNP  2:06 PM  8/26/2021     Patient seen and examined. I had a face to face encounter with the patient. Agree with exam.  Assessment and plan as outlined above and directed by me. Addition and corrections were done as deemed appropriate. My exam and plan include: Patient is doing well. She has minimal pain. She has completed course of Augmentin. This will be discontinued. ID will sign off.     Pierre Lazo MD  8/26/2021  2:53 PM

## 2021-08-26 NOTE — PLAN OF CARE
Problem: Falls - Risk of:  Goal: Will remain free from falls  Description: Will remain free from falls  8/26/2021 0202 by Bird Nunez RN  Outcome: Met This Shift  8/26/2021 0202 by Bird Nunez RN  Outcome: Met This Shift     Problem: Pain:  Goal: Pain level will decrease  Description: Pain level will decrease  8/26/2021 0202 by Bird Nunez RN  Outcome: Met This Shift  8/26/2021 0202 by Bird Nunez RN  Outcome: Met This Shift

## 2021-08-27 LAB
ACANTHOCYTES: ABNORMAL
ANION GAP SERPL CALCULATED.3IONS-SCNC: 9 MMOL/L (ref 7–16)
ANISOCYTOSIS: ABNORMAL
BASOPHILS ABSOLUTE: 0.02 E9/L (ref 0–0.2)
BASOPHILS RELATIVE PERCENT: 0.5 % (ref 0–2)
BUN BLDV-MCNC: 8 MG/DL (ref 6–23)
CALCIUM SERPL-MCNC: 8.7 MG/DL (ref 8.6–10.2)
CHLORIDE BLD-SCNC: 104 MMOL/L (ref 98–107)
CO2: 26 MMOL/L (ref 22–29)
CREAT SERPL-MCNC: 0.8 MG/DL (ref 0.5–1)
EOSINOPHILS ABSOLUTE: 0.22 E9/L (ref 0.05–0.5)
EOSINOPHILS RELATIVE PERCENT: 5 % (ref 0–6)
GFR AFRICAN AMERICAN: >60
GFR NON-AFRICAN AMERICAN: >60 ML/MIN/1.73
GLUCOSE BLD-MCNC: 91 MG/DL (ref 74–99)
HCT VFR BLD CALC: 30.5 % (ref 34–48)
HEMOGLOBIN: 8.6 G/DL (ref 11.5–15.5)
HYPOCHROMIA: ABNORMAL
IMMATURE GRANULOCYTES #: 0.02 E9/L
IMMATURE GRANULOCYTES %: 0.5 % (ref 0–5)
LYMPHOCYTES ABSOLUTE: 1.29 E9/L (ref 1.5–4)
LYMPHOCYTES RELATIVE PERCENT: 29.3 % (ref 20–42)
MCH RBC QN AUTO: 21.4 PG (ref 26–35)
MCHC RBC AUTO-ENTMCNC: 28.2 % (ref 32–34.5)
MCV RBC AUTO: 76.1 FL (ref 80–99.9)
MONOCYTES ABSOLUTE: 0.35 E9/L (ref 0.1–0.95)
MONOCYTES RELATIVE PERCENT: 7.9 % (ref 2–12)
NEUTROPHILS ABSOLUTE: 2.51 E9/L (ref 1.8–7.3)
NEUTROPHILS RELATIVE PERCENT: 56.8 % (ref 43–80)
OVALOCYTES: ABNORMAL
PDW BLD-RTO: 25.8 FL (ref 11.5–15)
PLATELET # BLD: 254 E9/L (ref 130–450)
PMV BLD AUTO: 8.5 FL (ref 7–12)
POIKILOCYTES: ABNORMAL
POLYCHROMASIA: ABNORMAL
POTASSIUM REFLEX MAGNESIUM: 4 MMOL/L (ref 3.5–5)
RBC # BLD: 4.01 E12/L (ref 3.5–5.5)
SCHISTOCYTES: ABNORMAL
SODIUM BLD-SCNC: 139 MMOL/L (ref 132–146)
STOMATOCYTES: ABNORMAL
WBC # BLD: 4.4 E9/L (ref 4.5–11.5)

## 2021-08-27 PROCEDURE — 85025 COMPLETE CBC W/AUTO DIFF WBC: CPT

## 2021-08-27 PROCEDURE — 2580000003 HC RX 258: Performed by: STUDENT IN AN ORGANIZED HEALTH CARE EDUCATION/TRAINING PROGRAM

## 2021-08-27 PROCEDURE — 6360000002 HC RX W HCPCS: Performed by: STUDENT IN AN ORGANIZED HEALTH CARE EDUCATION/TRAINING PROGRAM

## 2021-08-27 PROCEDURE — 6370000000 HC RX 637 (ALT 250 FOR IP): Performed by: STUDENT IN AN ORGANIZED HEALTH CARE EDUCATION/TRAINING PROGRAM

## 2021-08-27 PROCEDURE — 36415 COLL VENOUS BLD VENIPUNCTURE: CPT

## 2021-08-27 PROCEDURE — 97530 THERAPEUTIC ACTIVITIES: CPT

## 2021-08-27 PROCEDURE — 2580000003 HC RX 258: Performed by: INTERNAL MEDICINE

## 2021-08-27 PROCEDURE — 1200000000 HC SEMI PRIVATE

## 2021-08-27 PROCEDURE — 80048 BASIC METABOLIC PNL TOTAL CA: CPT

## 2021-08-27 PROCEDURE — 99232 SBSQ HOSP IP/OBS MODERATE 35: CPT | Performed by: INTERNAL MEDICINE

## 2021-08-27 PROCEDURE — 6370000000 HC RX 637 (ALT 250 FOR IP): Performed by: SURGERY

## 2021-08-27 PROCEDURE — 6360000002 HC RX W HCPCS: Performed by: INTERNAL MEDICINE

## 2021-08-27 PROCEDURE — 6370000000 HC RX 637 (ALT 250 FOR IP): Performed by: INTERNAL MEDICINE

## 2021-08-27 RX ORDER — DOCUSATE SODIUM 100 MG/1
100 CAPSULE, LIQUID FILLED ORAL 2 TIMES DAILY
Status: DISCONTINUED | OUTPATIENT
Start: 2021-08-27 | End: 2021-08-28 | Stop reason: HOSPADM

## 2021-08-27 RX ADMIN — ENOXAPARIN SODIUM 40 MG: 40 INJECTION SUBCUTANEOUS at 09:44

## 2021-08-27 RX ADMIN — ACETAMINOPHEN 1000 MG: 500 TABLET ORAL at 21:09

## 2021-08-27 RX ADMIN — SODIUM CHLORIDE 125 MG: 900 INJECTION INTRAVENOUS at 17:02

## 2021-08-27 RX ADMIN — ATORVASTATIN CALCIUM 40 MG: 40 TABLET, FILM COATED ORAL at 21:09

## 2021-08-27 RX ADMIN — DOCUSATE SODIUM 100 MG: 100 CAPSULE ORAL at 21:09

## 2021-08-27 RX ADMIN — FERROUS SULFATE TAB 325 MG (65 MG ELEMENTAL FE) 325 MG: 325 (65 FE) TAB at 09:44

## 2021-08-27 RX ADMIN — ACETAMINOPHEN 1000 MG: 500 TABLET ORAL at 05:57

## 2021-08-27 RX ADMIN — APIXABAN 5 MG: 5 TABLET, FILM COATED ORAL at 21:10

## 2021-08-27 RX ADMIN — Medication 10 ML: at 23:09

## 2021-08-27 RX ADMIN — FERROUS SULFATE TAB 325 MG (65 MG ELEMENTAL FE) 325 MG: 325 (65 FE) TAB at 17:02

## 2021-08-27 RX ADMIN — SODIUM CHLORIDE, PRESERVATIVE FREE 10 ML: 5 INJECTION INTRAVENOUS at 10:12

## 2021-08-27 RX ADMIN — ACETAMINOPHEN 1000 MG: 500 TABLET ORAL at 14:18

## 2021-08-27 ASSESSMENT — PAIN SCALES - GENERAL
PAINLEVEL_OUTOF10: 7
PAINLEVEL_OUTOF10: 3
PAINLEVEL_OUTOF10: 0
PAINLEVEL_OUTOF10: 3

## 2021-08-27 NOTE — PROGRESS NOTES
Physical Therapy    Facility/Department: [address] MED SURG/TELE  Treatment Note    NAME: Christin Cobb  : 1955  MRN: [id number]    Date of Service: 2021    Evaluating Therapist: Lalo Staton PT      Referring Provider:  Scotty Landau, MD     PT order : PT eval and treat      Room #:  071   DIAGNOSIS:  UTI . Recent falls . Re-assessment  due to R hemicolectomy   L 5th metatarsal fx 2021  - pt wears surgical shoe   PRECAUTIONS: falls, splinting        Social:  Pt lives with  Son  in a  2  floor plan with first floor set up,   2  steps and  1  rails to enter. Prior to admission pt walked with ww        Re-Evaluation  Date:  2021  Treatment   21  Short Term/ Long Term   Goals   Was pt agreeable to Eval/treatment?   yes   yes     Does pt have pain? abd No complaints     Bed Mobility  Rolling:  min assist    Supine to sit:  mod assist   Sit to supine: NT   Scooting: min assist  Rolling: MIN A  Supine to sit: MIN A  Sit to supine: NA  Scooting: MIN A seated to EOB  SBA    Transfers Sit to stand: min assist   Stand to sit: min  assist   Stand pivot: NT  Sit to stand: MIN A  Stand to sit: MIN A  Stand pivot: NT  SBA    Ambulation     30  feet with ww  with  min assist progressing Mod assist with fatigue  15 feet and 80 feet x 1 each using [employer] for support Min A for balance  100  feet with  ww  with  SBA    Stair negotiation NT  NA, pt fatigued with amb  4  steps with  1  rail with  CGA    AM- PAC RAW score  15/ 24  16/24            Pt is alert and able to follow instruction  Balance: poor dynamic using [employer] for support    Pt performed therapeutic exercise of the following: NT    Patient education  Pt was educated on UE usage to assist with transfers, gait promoting posture and staying within [employer] base of support    Patient response to education:   Pt verbalized understanding Pt demonstrated skill Pt requires further education in this area   yes With repeated instruction yes     ASSESSMENT: Comments: Nurse ok with rx. Pt found in bed, agreed to Rx. Pt assisted to EOB, sat EOB SBA. L LE surgical shoe applied. Gait to the bathroom followed by gait to the hallway. Tonya slow and inconsistent. Pt unsteady throughout, required constant hands on assist for balance and safety, displayed poor [employer] mechanics and posture throughout. Pt fatigued after activity. Treatment: Pt practiced and was instructed in the following treatment: transfer safety, gait/WW mechanics    Pt was left in a bedside chair with call light in reach. Time in 1010   Time out 1031   Total Treatment Time 21 minutes   CPT codes:     Therapeutic activities 14248 21 minutes   Therapeutic exercises 48476 0 minutes       Pt is making good progress toward established Physical Therapy goals. Continue with physical therapy current plan of care.     Nena Juarez PTA   License Number: [id number]

## 2021-08-27 NOTE — PROGRESS NOTES
GENERAL SURGERY  DAILY PROGRESS NOTE  8/27/2021    Subjective:  No nausea, vomiting, or fevers. No BM or flatus. Tolerating full liquid diet without complaint. She is ambulating well. Objective:  BP (!) 151/73   Pulse 76   Temp 97.9 °F (36.6 °C) (Oral)   Resp 18   Ht 5' 3\" (1.6 m)   Wt 158 lb 4.8 oz (71.8 kg)   LMP 02/14/2011   SpO2 95%   BMI 28.04 kg/m²     GENERAL:  No acute distress. Alert and interactive. Oriented x3. LUNGS:  No cough. Nonlabored breathing on 3L NC  CARDIOVASC:  Normal rate, no cyanosis. ABDOMEN:  Soft, mildly distended, appropriately tender. No guarding / rigidity / rebound. EXTREMITIES: Moves all extremities. No swelling or edema. Assessment/Plan:  73 y.o. female presenting with cecal mass on CTAP s/p open right hemicolectomy. - Continue full liquid diet  - Encouraged ambulation and sitting up in chair  - pain/nausea control  - IVF  - awaiting return of bowel function  - anticoagulation ok from our standpoint    Plan discussed with Dr. Jose Fairchild. Electronically signed by Sundeep Flood MD on 8/27/2021 at 8:46 AM      Attending Physician Statement:    Chief Complaint:   Chief Complaint   Patient presents with    Fall     frequent falls.  Nausea       I have examined the patient and performed the key aspects of physical exam, reviewed the record (including all pertinent and new radiology images and laboratory findings), and discussed the case with the surgical team.  I agree with the assessment and plan with the following additions, corrections, and changes. 14pt review of symptoms completed and negative except as mentioned. Doing well. No abdominal pain. Tolerating her full liquids. Had a small smear today but is not sure if she is passing flatus or not. Abdomen completely benign. Await improved bowel function. Advance her to regular diet. Hopefully home soon.     Vasile Moreno MD  08/27/21  1:25 PM    NOTE: This report, in part or full, may have been transcribed using voice recognition software. Every effort was made to ensure accuracy; however, inadvertent computerized transcription errors may be present. Please excuse any transcriptional grammatical or spelling errors that may have escaped my editorial review.

## 2021-08-27 NOTE — CARE COORDINATION
FLD; POD #4 . [facility] improving; increasing activity; plan is to return home with son, [facility], orders on chart. Ronni Chavarria.

## 2021-08-27 NOTE — PROGRESS NOTES
[facility] CARE AT [facility] Hospitalist   Progress Note    Admitting Date and Time: 8/16/2021  5:28 PM  Admit Dx: UTI (urinary tract infection) [N39.0]  Generalized weakness [R53.1]  Ambulatory dysfunction [R26.2]  Urinary tract infection without hematuria, site unspecified [N39.0]    Subjective:    Patient was admitted with UTI (urinary tract infection) [N39.0]  Generalized weakness [R53.1]  Ambulatory dysfunction [R26.2]  Urinary tract infection without hematuria, site unspecified [N39.0]. Awake and alert, looks very good. Tolerating full liquid diet. Minimal abdominal pain. Doesn't remember passing gas but she does have small stool smear. Patient seen with surgery. ROS: denies fever, chills, cp, sob, n/v, HA unless stated above.      apixaban  5 mg Oral BID    ferric gluconate (FERRLECIT) IVPB  125 mg IntraVENous Daily    acetaminophen  1,000 mg Oral 3 times per day    atorvastatin  40 mg Oral Nightly    sodium chloride flush  10 mL IntraVENous 2 times per day    ferrous sulfate  325 mg Oral BID WC     HYDROmorphone, 0.5 mg, Q4H PRN  ketorolac, 15 mg, Q6H PRN  oxyCODONE, 5 mg, Q4H PRN   Or  oxyCODONE, 10 mg, Q4H PRN  sodium chloride flush, 10 mL, PRN  sodium chloride, 25 mL, PRN  polyethylene glycol, 17 g, Daily PRN         Objective:    BP (!) 151/73   Pulse 76   Temp 97.9 °F (36.6 °C) (Oral)   Resp 18   Ht 5' 3\" (1.6 m)   Wt 158 lb 4.8 oz (71.8 kg)   LMP 02/14/2011   SpO2 95%   BMI 28.04 kg/m²   General Appearance: alert and oriented to person, place and time, in no acute distress   Skin: warm and dry, no rash or erythema  Head: normocephalic and atraumatic  Neck: supple and non-tender   Pulmonary/Chest: clear to auscultation bilaterally  Cardiovascular: normal rate, regular rhythm, normal S1 and S2, no murmurs  Abdomen: midline incision covered, no redness, belly soft, no guarding, very tender near incision  Extremities: no edema    Recent Labs     08/25/21  0510 08/26/21  0750 08/27/21  0247    140 139   K 4.5 3.9 4.0    102 104   CO2 29 28 26   BUN 11 7 8   CREATININE 0.8 0.8 0.8   GLUCOSE 124* 92 91   CALCIUM 9.1 8.4* 8.7       Recent Labs     08/25/21  0510 08/26/21  0750 08/27/21  0247   WBC 8.6 4.7 4.4*   RBC 3.99 4.08 4.01   HGB 8.4* 8.7* 8.6*   HCT 29.5* 30.7* 30.5*   MCV 73.9* 75.2* 76.1*   MCH 21.1* 21.3* 21.4*   MCHC 28.5* 28.3* 28.2*   RDW 24.8* 25.6* 25.8*    271 254   MPV 8.6 8.6 8.5       Radiology:   CT CHEST W CONTRAST   Final Result   Cardiomegaly with coronary artery calcification and moderate pericardial   effusion. COPD with pleural based nodular density in the right lower lobe probably   scarring. Malignancy is less likely. 3-5 mm additional nodules are noted in   the right lower lobe. Surveillance according to Fleischner society   guidelines are recommended. RECOMMENDATIONS:   Nodular density in the right lung base. The surveillance according to   Fleischner society guidelines is recommended         US DUP LOWER EXTREMITIES BILATERAL VENOUS   Final Result   No evidence of DVT in either lower extremity. CT ABDOMEN PELVIS W IV CONTRAST Additional Contrast? Oral   Final Result   1. Fair large cecal neoplasm causing encasement of the central lumen of the   bowel in the cecal area, with the extension and encasement into the terminal   ileum and the proximal ascending colon. 2.  Presence of satellite mesenteric adenopathy adjacent the cecal area, most   likely metastatic. 3.  Some inflammatory reaction surrounds the pericecal spaces. The could not   conspicuously identified the appendix. The small well-contained air density   seen peripherally to the cecum which can represent remnant of an appendix   also involved by cecal mass or a small area of well contained localized   perforation (images: A2/34; A3/90 A1/121). .         CT HEAD WO CONTRAST   Final Result   No acute intracranial abnormality.   Age-related loss of brain volume and   chronic periventricular and subcortical white matter ischemic changes. Multifocal chronic infarcts as described. Multiple chronic small basal ganglia and corona radiata lacunar infarcts. XR CHEST PORTABLE   Final Result   No acute cardiopulmonary abnormality. XR HIP BILATERAL W AP PELVIS (2 VIEWS)   Final Result   1. No acute abnormality. 2. Calcified fibroid uterus. Assessment:    Principal Problem:    Malignant neoplasm of ascending colon (HCC)  Active Problems:    UTI (urinary tract infection)  Resolved Problems:    * No resolved hospital problems. *      Plan:    1.  Mental status changes  Due to sepsis - bacteremia, and large cecal tumor.     Also had right parietal stroke in May, symptoms seem to have been mostly mental status related and not motor.     I am assuming patient at baseline at this time, although would need to confirm in person with family member.     2.  Sepsis/bateremia -   Thought initially secondary to UTI. Urine culture negative     2/2 blood cultures positive for gram variable rods - clostridium septicum  Antibiotics changed to ertapenem and CT abdomen shows large cecal mass likely a neoplasm with extension and encasement of the terminal ileum and proximal ascending colon.     Antibiotics changed to augmentin and now completed. No fever.     3.  Anemia  Hgb 8.2 on admission, but had IVF and dropped to 6.2 - transfused one unit and now up to 7.2, transfused up to hgb 8.4 and has remained stable.      No bleeding seen.  However iron level is very low.  Received iv iron X 2 as well early in admission. Watch hgb daily. Likely bleeding chronically due to cecal mass.     Today restarting eliquis.     4.  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 Patient should be on eliquis.   Also on plavix and if embolic I don't see this as additive - will stop.     5.  History of DVT     Lower extremities now negative for DVT.     Restarting eliquis today.     6.  Large cecal mass  POD #4 - open hemicolectomy with ileocolic anastomosis.     CEA very high, almost certainly colon cancer. No obvious metastases, although lymphatic spread seen on CT scan.     Appreciate oncology help.     Medically stable, just waiting on bowel function to return then able to be discharged home. Seen with surgery, advancing diet, hopeful discharge tomorrow.     Electronically signed by Bobby Monroe MD on 8/27/2021 at 1:43 PM

## 2021-08-27 NOTE — PROGRESS NOTES
Comprehensive Nutrition Assessment    Type and Reason for Visit:  Reassess    Nutrition Recommendations/Plan: Continue current diet and ONS to help optimize nutrient needs. Continue to monitor. Nutrition Assessment:  Pt presented with cecal mass on CTAP s/p open right hemicolectomy. Pt adm with sepsis 2/2 UTI. Status post colonoscopy with biopsy and tattooing 8/22/2021. Newly dx colon adenocarcinoma  Pt tolerating full liquid diet. No N/V. Malnutrition Assessment:  Malnutrition Status:  No malnutrition    Context:  Chronic Illness     Findings of the 6 clinical characteristics of malnutrition:  Energy Intake:  Mild decrease in energy intake (Comment) (pt stated prior to adm poor appetite and PO ~1-2wks)  Weight Loss:  7 - 7.5% over 3 months     Body Fat Loss:  No significant body fat loss     Muscle Mass Loss:  No significant muscle mass loss    Fluid Accumulation:  No significant fluid accumulation     Strength:  Not Performed    Estimated Daily Nutrient Needs:  Energy (kcal):   (MSJx1. 2SF); Weight Used for Energy Requirements:  Current     Protein (g):  70-80g (1.3-1.5g/kg IBW); Weight Used for Protein Requirements:  Ideal        Fluid (ml/day):  ; Method Used for Fluid Requirements:  1 ml/kcal      Nutrition Related Findings:  A&Ox3, BS hypoactive, Abd Soft and Guarded, Edema BLE trace nonpitting      Wounds:  Surgical Incision       Current Nutrition Therapies:    Adult Oral Nutrition Supplement; Clear Liquid Oral Supplement  ADULT DIET; Full Liquid    Anthropometric Measures:  · Height: 5' 3\" (160 cm)  · Current Body Weight: 158 lb 4.8 oz (71.8 kg) (8/23)   · Admission Body Weight: 162 lb (73.5 kg) (8/17 [facility])    · Usual Body Weight:  (UTO per EMR)     · Ideal Body Weight: 115 lbs; % Ideal Body Weight 137.7 %   · BMI: 28      · BMI Categories: Overweight (BMI 25.0-29. 9)       Nutrition Diagnosis:   · Inadequate oral intake related to catabolic illness (colon adenocarcinoma) as evidenced by intake 51-75%    Nutrition Interventions:   Food and/or Nutrient Delivery:  Continue Current Diet, Continue Oral Nutrition Supplement  Nutrition Education/Counseling:  No recommendation at this time   Coordination of Nutrition Care:  Continue to monitor while inpatient    Goals:  >75% of meals and ONS consumed       Nutrition Monitoring and Evaluation:   Behavioral-Environmental Outcomes:  None Identified   Food/Nutrient Intake Outcomes:  Food and Nutrient Intake, Supplement Intake  Physical Signs/Symptoms Outcomes:  Biochemical Data, GI Status, Fluid Status or Edema, Nutrition Focused Physical Findings, Skin, Weight     Discharge Planning:     Too soon to determine     Electronically signed by Sandrine Kirk RD, LD on 8/27/21 at 9:58 AM EDT    Contact: [phone]

## 2021-08-28 VITALS
OXYGEN SATURATION: 94 % | WEIGHT: 155.1 LBS | HEART RATE: 73 BPM | RESPIRATION RATE: 17 BRPM | SYSTOLIC BLOOD PRESSURE: 165 MMHG | TEMPERATURE: 98 F | BODY MASS INDEX: 27.48 KG/M2 | HEIGHT: 63 IN | DIASTOLIC BLOOD PRESSURE: 78 MMHG

## 2021-08-28 PROBLEM — N39.0 UTI (URINARY TRACT INFECTION): Status: RESOLVED | Noted: 2021-08-17 | Resolved: 2021-08-28

## 2021-08-28 LAB
ANION GAP SERPL CALCULATED.3IONS-SCNC: 9 MMOL/L (ref 7–16)
ANISOCYTOSIS: ABNORMAL
BASOPHILS ABSOLUTE: 0.03 E9/L (ref 0–0.2)
BASOPHILS RELATIVE PERCENT: 0.6 % (ref 0–2)
BUN BLDV-MCNC: 8 MG/DL (ref 6–23)
CALCIUM SERPL-MCNC: 8.8 MG/DL (ref 8.6–10.2)
CHLORIDE BLD-SCNC: 103 MMOL/L (ref 98–107)
CO2: 25 MMOL/L (ref 22–29)
CREAT SERPL-MCNC: 0.8 MG/DL (ref 0.5–1)
EOSINOPHILS ABSOLUTE: 0.26 E9/L (ref 0.05–0.5)
EOSINOPHILS RELATIVE PERCENT: 5.5 % (ref 0–6)
GFR AFRICAN AMERICAN: >60
GFR NON-AFRICAN AMERICAN: >60 ML/MIN/1.73
GLUCOSE BLD-MCNC: 95 MG/DL (ref 74–99)
HCT VFR BLD CALC: 32.4 % (ref 34–48)
HEMOGLOBIN: 9.2 G/DL (ref 11.5–15.5)
HYPOCHROMIA: ABNORMAL
IMMATURE GRANULOCYTES #: 0.02 E9/L
IMMATURE GRANULOCYTES %: 0.4 % (ref 0–5)
LYMPHOCYTES ABSOLUTE: 1.36 E9/L (ref 1.5–4)
LYMPHOCYTES RELATIVE PERCENT: 28.6 % (ref 20–42)
MCH RBC QN AUTO: 21.5 PG (ref 26–35)
MCHC RBC AUTO-ENTMCNC: 28.4 % (ref 32–34.5)
MCV RBC AUTO: 75.7 FL (ref 80–99.9)
MONOCYTES ABSOLUTE: 0.3 E9/L (ref 0.1–0.95)
MONOCYTES RELATIVE PERCENT: 6.3 % (ref 2–12)
NEUTROPHILS ABSOLUTE: 2.79 E9/L (ref 1.8–7.3)
NEUTROPHILS RELATIVE PERCENT: 58.6 % (ref 43–80)
OVALOCYTES: ABNORMAL
PDW BLD-RTO: 26.3 FL (ref 11.5–15)
PLATELET # BLD: 267 E9/L (ref 130–450)
PMV BLD AUTO: 8.5 FL (ref 7–12)
POIKILOCYTES: ABNORMAL
POLYCHROMASIA: ABNORMAL
POTASSIUM REFLEX MAGNESIUM: 3.9 MMOL/L (ref 3.5–5)
RBC # BLD: 4.28 E12/L (ref 3.5–5.5)
SODIUM BLD-SCNC: 137 MMOL/L (ref 132–146)
WBC # BLD: 4.8 E9/L (ref 4.5–11.5)

## 2021-08-28 PROCEDURE — 6360000002 HC RX W HCPCS: Performed by: INTERNAL MEDICINE

## 2021-08-28 PROCEDURE — 2580000003 HC RX 258: Performed by: INTERNAL MEDICINE

## 2021-08-28 PROCEDURE — 99239 HOSP IP/OBS DSCHRG MGMT >30: CPT | Performed by: INTERNAL MEDICINE

## 2021-08-28 PROCEDURE — 85025 COMPLETE CBC W/AUTO DIFF WBC: CPT

## 2021-08-28 PROCEDURE — 6370000000 HC RX 637 (ALT 250 FOR IP): Performed by: STUDENT IN AN ORGANIZED HEALTH CARE EDUCATION/TRAINING PROGRAM

## 2021-08-28 PROCEDURE — 36415 COLL VENOUS BLD VENIPUNCTURE: CPT

## 2021-08-28 PROCEDURE — 80048 BASIC METABOLIC PNL TOTAL CA: CPT

## 2021-08-28 PROCEDURE — 2580000003 HC RX 258: Performed by: STUDENT IN AN ORGANIZED HEALTH CARE EDUCATION/TRAINING PROGRAM

## 2021-08-28 PROCEDURE — 99024 POSTOP FOLLOW-UP VISIT: CPT | Performed by: SURGERY

## 2021-08-28 PROCEDURE — 6370000000 HC RX 637 (ALT 250 FOR IP): Performed by: INTERNAL MEDICINE

## 2021-08-28 PROCEDURE — 6370000000 HC RX 637 (ALT 250 FOR IP): Performed by: SURGERY

## 2021-08-28 RX ORDER — FERROUS SULFATE 325(65) MG
325 TABLET ORAL
Qty: 30 TABLET | Refills: 3 | Status: SHIPPED | OUTPATIENT
Start: 2021-08-28 | End: 2021-10-19 | Stop reason: SDUPTHER

## 2021-08-28 RX ORDER — OXYCODONE HYDROCHLORIDE 5 MG/1
5 TABLET ORAL EVERY 4 HOURS PRN
Qty: 24 TABLET | Refills: 0 | Status: SHIPPED | OUTPATIENT
Start: 2021-08-28 | End: 2021-09-04

## 2021-08-28 RX ADMIN — ACETAMINOPHEN 1000 MG: 500 TABLET ORAL at 06:30

## 2021-08-28 RX ADMIN — APIXABAN 5 MG: 5 TABLET, FILM COATED ORAL at 19:11

## 2021-08-28 RX ADMIN — FERROUS SULFATE TAB 325 MG (65 MG ELEMENTAL FE) 325 MG: 325 (65 FE) TAB at 09:52

## 2021-08-28 RX ADMIN — SODIUM CHLORIDE 125 MG: 900 INJECTION INTRAVENOUS at 10:10

## 2021-08-28 RX ADMIN — DOCUSATE SODIUM 100 MG: 100 CAPSULE ORAL at 09:52

## 2021-08-28 RX ADMIN — FERROUS SULFATE TAB 325 MG (65 MG ELEMENTAL FE) 325 MG: 325 (65 FE) TAB at 16:59

## 2021-08-28 RX ADMIN — Medication 10 ML: at 09:54

## 2021-08-28 RX ADMIN — APIXABAN 5 MG: 5 TABLET, FILM COATED ORAL at 09:52

## 2021-08-28 ASSESSMENT — PAIN SCALES - GENERAL
PAINLEVEL_OUTOF10: 0
PAINLEVEL_OUTOF10: 3

## 2021-08-28 NOTE — PROGRESS NOTES
Patient without complaints. She is tolerating diet. Bowels are functioning. On exam abdomen is soft nondistended incision is intact. Overall appears to be recovering satisfactorily. Okay for discharge.

## 2021-08-28 NOTE — DISCHARGE SUMMARY
[facility]       Hospitalist Physician Discharge Summary        [address] [address] [employer])  [address] [facility], [address] [place] [address] [address] [address] [facility]        Activity level: As tolerated with assistance    Diet: Adult Oral Nutrition Supplement; Clear Liquid Oral Supplement  ADULT DIET; Regular; Low Fiber    Dispo:Home    Condition at discharge: Stable    Patient ID:  Janet Myers  [id number]  73 y.o.  1955    Admit date: 8/16/2021    Discharge date and time:  8/28/2021  4:48 PM    Admission Diagnoses: Principal Problem:    Malignant neoplasm of ascending colon ([facility] [address]  Resolved Problems:    UTI (urinary tract infection)      Discharge Diagnoses: Principal Problem:    Malignant neoplasm of ascending colon ([facility] [address]  Resolved Problems:    UTI (urinary tract infection)      Consults:  IP CONSULT TO PHYSICAL MEDICINE REHAB  IP CONSULT TO INFECTIOUS DISEASES  IP CONSULT TO PHARMACY  IP CONSULT TO IV TEAM  IP CONSULT TO HOME CARE NEEDS  IP CONSULT TO GENERAL SURGERY  IP CONSULT TO DIETITIAN  IP CONSULT TO DIETITIAN  IP CONSULT TO ONCOLOGY  IP CONSULT TO IV TEAM    Procedures: Colonoscopy - 8/22  Open right hemicolectomy with omental pedicle flap 8/23    Hospital Course: Patient was admitted with UTI (urinary tract infection) [N39.0]  Generalized weakness [R53.1]  Ambulatory dysfunction [R26.2]  Urinary tract infection without hematuria, site unspecified [N39.0]. 79-year-old female with recent history of stroke that was felt to be paradoxical in nature as patient had 4 mm ovale and lower extremity clots. She was discharged on Eliquis and Plavix at home. Turns to this emergency room with mental status change and in the ER had a fever of 104. She was felt to have pyuria and was admitted for possible urinary tract infection. She was started on Rocephin shortly after blood cultures were positive for gram variable rods.   Her urine culture was negative. Patient was seen by infectious disease who felt that because of the type of bacteria that this was from an intra-abdominal source and ordered a CT scan. Antibiotic was changed to ertapenem. CT scan showed a large cecal mass almost obstructing the lumen of the large intestine. General surgery consultation was obtained. Blood cultures finally grew Clostridium septicum. Eliquis needed to be stopped and for this reason a lower extremity duplex was done which was negative for DVT. Patient underwent colonoscopy which confirmed a large cecal mass and then underwent open right hemicolectomy with omental pedicle flap on August 23. After surgery patient did very well was able to eat eventually passed gas and had bowel movement. Patient also known to be anemic. And was felt to be secondary to be secondary to the colon cancer. She received a blood transfusion after surgery, and also received 2 bags of IV iron. She was seen by oncology as well and has a follow-up as an outpatient. Final pathology from the cecal mass was consistent with invasive moderately differentiated adenocarcinoma. There were local lymph nodes but no metastases seen on imaging. As patient stroke was felt to be embolic in nature her Plavix was stopped on discharge but her Eliquis was resumed. She received 10 total days of antibiotics and these were stopped by infectious disease. She did well with therapy and was felt to be stable for discharge home with home health.     Discharge Exam:  Vitals:    08/27/21 2109 08/28/21 0441 08/28/21 0945 08/28/21 1600   BP: (!) 159/71  (!) 157/70 (!) 165/78   Pulse: 75  71 73   Resp: 20  18 17   Temp: 97.5 °F (36.4 °C)  97.4 °F (36.3 °C) 98 °F (36.7 °C)   TempSrc: Oral  Oral Oral   SpO2:   93% 94%   Weight:  155 lb 1.6 oz (70.4 kg)     Height:         General Appearance: alert and oriented to person, place and time, in no acute distress   Skin: warm and dry, no rash or erythema  Head: normocephalic and atraumatic  Neck: supple and non-tender   Pulmonary/Chest: clear to auscultation bilaterally  Cardiovascular: normal rate, regular rhythm, normal S1 and S2, no murmurs  Abdomen: midline incision covered, no redness, belly soft, no guarding, very tender near incision  Extremities: no edema    LABS:  Recent Labs     08/26/21  0750 08/27/21  0247 08/28/21  0322    139 137   K 3.9 4.0 3.9    104 103   CO2 28 26 25   BUN 7 8 8   CREATININE 0.8 0.8 0.8   GLUCOSE 92 91 95   CALCIUM 8.4* 8.7 8.8       Recent Labs     08/26/21  0750 08/27/21  0247 08/28/21  0322   WBC 4.7 4.4* 4.8   RBC 4.08 4.01 4.28   HGB 8.7* 8.6* 9.2*   HCT 30.7* 30.5* 32.4*   MCV 75.2* 76.1* 75.7*   MCH 21.3* 21.4* 21.5*   MCHC 28.3* 28.2* 28.4*   RDW 25.6* 25.8* 26.3*    254 267   MPV 8.6 8.5 8.5       No results for input(s): POCGLU in the last 72 hours. Imaging:   CT CHEST W CONTRAST   Final Result   Cardiomegaly with coronary artery calcification and moderate pericardial   effusion. COPD with pleural based nodular density in the right lower lobe probably   scarring. Malignancy is less likely. 3-5 mm additional nodules are noted in   the right lower lobe. Surveillance according to Fleischner society   guidelines are recommended. RECOMMENDATIONS:   Nodular density in the right lung base. The surveillance according to   Fleischner society guidelines is recommended         US DUP LOWER EXTREMITIES BILATERAL VENOUS   Final Result   No evidence of DVT in either lower extremity. CT ABDOMEN PELVIS W IV CONTRAST Additional Contrast? Oral   Final Result   1. Fair large cecal neoplasm causing encasement of the central lumen of the   bowel in the cecal area, with the extension and encasement into the terminal   ileum and the proximal ascending colon. 2.  Presence of satellite mesenteric adenopathy adjacent the cecal area, most   likely metastatic.       3.  Some inflammatory reaction surrounds the pericecal spaces. The could not   conspicuously identified the appendix. The small well-contained air density   seen peripherally to the cecum which can represent remnant of an appendix   also involved by cecal mass or a small area of well contained localized   perforation (images: A2/34; A3/90 A1/121). .         CT HEAD WO CONTRAST   Final Result   No acute intracranial abnormality. Age-related loss of brain volume and   chronic periventricular and subcortical white matter ischemic changes. Multifocal chronic infarcts as described. Multiple chronic small basal ganglia and corona radiata lacunar infarcts. XR CHEST PORTABLE   Final Result   No acute cardiopulmonary abnormality. XR HIP BILATERAL W AP PELVIS (2 VIEWS)   Final Result   1. No acute abnormality. 2. Calcified fibroid uterus. Patient Instructions:      Medication List      START taking these medications    apixaban 5 MG Tabs tablet  Commonly known as: ELIQUIS  Take 1 tablet by mouth 2 times daily     ferrous sulfate 325 (65 Fe) MG tablet  Commonly known as: IRON 325  Take 1 tablet by mouth daily (with breakfast)     oxyCODONE 5 MG immediate release tablet  Commonly known as: ROXICODONE  Take 1 tablet by mouth every 4 hours as needed for Pain for up to 7 days.         CONTINUE taking these medications    atorvastatin 40 MG tablet  Commonly known as: LIPITOR  Take 1 tablet by mouth nightly     Blood Pressure Kit  1 each by Does not apply route daily     Incontinence Supply Disposable Misc  Large size     lisinopril 5 MG tablet  Commonly known as: PRINIVIL;ZESTRIL  Take 1 tablet by mouth daily        STOP taking these medications    clopidogrel 75 MG tablet  Commonly known as: PLAVIX           Where to Get Your Medications      You can get these medications from any pharmacy    Bring a paper prescription for each of these medications  · apixaban 5 MG Tabs tablet  · ferrous sulfate 325 (65 Fe) MG

## 2021-08-28 NOTE — PROGRESS NOTES
IV site and telemetry monitor removed, per patient her son just had a hospital bed delivered to the house and is rearranging some furniture and will be coming to get her this evening. Paper scripts placed in soft chart with discharge paperwork.

## 2021-08-28 NOTE — CARE COORDINATION
Social Work discharge planning   Glenn spoke to son Clara Bloom and advised him that a hospital bed and tray table that he is requesting for pt would need approved by [employer]. GLENN advised him that insurance would not be available until Monday, and the paperwork for a hospital bed would have to be approved. Glenn advised him pt walked a total of 95 feet per pt note. Son said pt was using a regular bed at home pta. Glenn suggested he talk to pt's PCP at follow up as to whether a hospital bed would benefit pt. Glenn transferred son to [place] almanzar to et a  time, as he said he will drive her home via car. He confirmed she ha a ww.    Electronically signed by Bobby Dewey on 8/28/2021 at 3:02 PM

## 2021-08-31 DIAGNOSIS — S92.355A CLOSED NONDISPLACED FRACTURE OF FIFTH METATARSAL BONE OF LEFT FOOT, INITIAL ENCOUNTER: Primary | ICD-10-CM

## 2021-09-16 ENCOUNTER — TELEPHONE (OUTPATIENT)
Dept: SURGERY | Age: 66
End: 2021-09-16

## 2021-09-16 PROCEDURE — G0180 MD CERTIFICATION HHA PATIENT: HCPCS | Performed by: INTERNAL MEDICINE

## 2021-09-16 NOTE — PROGRESS NOTES

## 2021-09-16 NOTE — TELEPHONE ENCOUNTER
Scheduled patient for mediport insertion on 9/21/21 at 10:00am in [facility] with Dr. Yoandy Laguerre. Patient needs to report at the front entrance 2 hours before the procedure, NPO after the midnight the night before the procedure. Hold Eliquis for 3 days prior to the procedure. Patient's son verbalized understanding. instruction letter mailed. Encouraged to call our office if any questions.   Electronically signed by Keri Mccauley on 9/16/2021 at 10:13 AM

## 2021-09-16 NOTE — TELEPHONE ENCOUNTER
Prior Authorization Form:      DEMOGRAPHICS:                     Patient Name:  Jolanta Orozco  Patient :  1955            Insurance:  Payor: Travon Tran / Plan: Tawana Robison GOLD PLUS HMO / Product Type: *No Product type* /   Insurance ID Number:    Payor/Plan Subscr  Sex Relation Sub. Ins. ID Effective Group Num   1. [id number] 1955 Female Self [id number] 3/1/21 [id number]                                   [address] [address]   2.  [place] 1955 Female Self [id number] 21                                    [address]         DIAGNOSIS & PROCEDURE:                       Procedure/Operation: mediport insertion          CPT Code: 01461    Diagnosis:  Colon CA    ICD10 Code: C18.9    Location:  [place]    Surgeon:  Dr. Foreign Norman INFORMATION:                          Date: 21    Time: 9:50am              Anesthesia:  MAC/TIVA                                                       Status:  Outpatient        Special Comments:  N/A       Electronically signed by Earl Dhaliwal on 2021 at 10:09 AM

## 2021-09-16 NOTE — PROGRESS NOTES
[facility] PRE-ADMISSION TESTING INSTRUCTIONS    The Preadmission Testing patient is instructed accordingly using the following criteria (check applicable):    ARRIVAL INSTRUCTIONS:  [x] Parking the day of Surgery is located in the Main Entrance lot. Upon entering the door, make an immediate right to the surgery reception desk    [x] Bring photo ID and insurance card    [] Bring in a copy of Living will or Durable Power of  papers. [x] Please be sure to arrange for responsible adult to provide transportation to and from the hospital    [x] Please arrange for responsible adult to be with you for the 24 hour period post procedure due to having anesthesia      GENERAL INSTRUCTIONS:    [x] Nothing by mouth after midnight, including gum, candy, mints or water    [x] You may brush your teeth, but do not swallow any water    [] Take medications as instructed with 1-2 oz of water None  [] Stop herbal supplements and vitamins 5 days prior to procedure    [] Follow preop dosing of blood thinners per physician instructions    [] Take 1/2 dose of evening insulin, but no insulin after midnight    [] No oral diabetic medications after midnight    [] If diabetic and have low blood sugar or feel symptomatic, take 1-2oz apple juice only    [] Bring inhalers day of surgery    [] Bring C-PAP/ Bi-Pap day of surgery    [] Bring urine specimen day of surgery    [x] Shower or bath with soap, lather and rinse well, AM of Surgery, no lotion, powders or creams to surgical site    [] Follow bowel prep as instructed per surgeon    [x] No tobacco products within 24 hours of surgery     [x] No alcohol or illegal drug use within 24 hours of surgery.     [x] Jewelry, body piercing's, eyeglasses, contact lenses and dentures are not permitted into surgery (bring cases)      [x] Please do not wear any nail polish, make up or hair products on the day of surgery    [x] You can expect a call the business day prior to procedure to notify you if your arrival time changes    [x] If you receive a survey after surgery we would greatly appreciate your comments    [] Parent/guardian of a minor must accompany their child and remain on the premises  the entire time they are under our care     [] Pediatric patients may bring favorite toy, blanket or comfort item with them    [] A caregiver or family member must remain with the patient during their stay if they are mentally handicapped, have dementia, disoriented or unable to use a call light or would be a safety concern if left unattended    [x] Please notify surgeon if you develop any illness between now and time of surgery (cold, cough, sore throat, fever, nausea, vomiting) or any signs of infections  including skin, wounds, and dental.    [x]  The Outpatient Pharmacy is available to fill your prescription here on your day of surgery, ask your preop nurse for details    [] Other instructions    EDUCATIONAL MATERIALS PROVIDED:    [] PAT Preoperative Education Packet/Booklet     [] Medication List    [] Transfusion bracelet applied with instructions    [] Shower with soap, lather and rinse well, and use CHG wipes provided the evening before surgery as instructed    [] Incentive spirometer with instructions

## 2021-09-17 ENCOUNTER — TELEPHONE (OUTPATIENT)
Dept: FAMILY MEDICINE CLINIC | Age: 66
End: 2021-09-17
Payer: MEDICARE

## 2021-09-17 DIAGNOSIS — A41.9 SEPTICEMIA DURING LABOR (HCC): Primary | ICD-10-CM

## 2021-09-20 ENCOUNTER — ANESTHESIA EVENT (OUTPATIENT)
Dept: OPERATING ROOM | Age: 66
End: 2021-09-20
Payer: MEDICARE

## 2021-09-21 ENCOUNTER — HOSPITAL ENCOUNTER (OUTPATIENT)
Dept: GENERAL RADIOLOGY | Age: 66
Discharge: HOME | End: 2021-09-23
Attending: SURGERY
Payer: MEDICARE

## 2021-09-21 ENCOUNTER — HOSPITAL ENCOUNTER (OUTPATIENT)
Age: 66
Setting detail: OUTPATIENT SURGERY
Discharge: HOME | End: 2021-09-21
Attending: SURGERY | Admitting: SURGERY
Payer: MEDICARE

## 2021-09-21 ENCOUNTER — ANESTHESIA (OUTPATIENT)
Dept: OPERATING ROOM | Age: 66
End: 2021-09-21
Payer: MEDICARE

## 2021-09-21 ENCOUNTER — APPOINTMENT (OUTPATIENT)
Dept: GENERAL RADIOLOGY | Age: 66
End: 2021-09-21
Attending: SURGERY
Payer: MEDICARE

## 2021-09-21 VITALS — DIASTOLIC BLOOD PRESSURE: 54 MMHG | SYSTOLIC BLOOD PRESSURE: 112 MMHG | OXYGEN SATURATION: 99 %

## 2021-09-21 VITALS
WEIGHT: 155 LBS | SYSTOLIC BLOOD PRESSURE: 142 MMHG | HEIGHT: 60 IN | RESPIRATION RATE: 18 BRPM | TEMPERATURE: 97.4 F | BODY MASS INDEX: 30.43 KG/M2 | OXYGEN SATURATION: 99 % | DIASTOLIC BLOOD PRESSURE: 81 MMHG | HEART RATE: 91 BPM

## 2021-09-21 DIAGNOSIS — Z01.818 PREOP TESTING: Primary | ICD-10-CM

## 2021-09-21 DIAGNOSIS — R52 PAIN: ICD-10-CM

## 2021-09-21 LAB — SARS-COV-2, NAAT: NOT DETECTED

## 2021-09-21 PROCEDURE — 2500000003 HC RX 250 WO HCPCS

## 2021-09-21 PROCEDURE — 2580000003 HC RX 258: Performed by: SURGERY

## 2021-09-21 PROCEDURE — 3600000012 HC SURGERY LEVEL 2 ADDTL 15MIN: Performed by: SURGERY

## 2021-09-21 PROCEDURE — 7100000010 HC PHASE II RECOVERY - FIRST 15 MIN: Performed by: SURGERY

## 2021-09-21 PROCEDURE — 6360000002 HC RX W HCPCS: Performed by: SURGERY

## 2021-09-21 PROCEDURE — 77001 FLUOROGUIDE FOR VEIN DEVICE: CPT | Performed by: SURGERY

## 2021-09-21 PROCEDURE — 2709999900 HC NON-CHARGEABLE SUPPLY: Performed by: SURGERY

## 2021-09-21 PROCEDURE — 71045 X-RAY EXAM CHEST 1 VIEW: CPT

## 2021-09-21 PROCEDURE — C1788 PORT, INDWELLING, IMP: HCPCS | Performed by: SURGERY

## 2021-09-21 PROCEDURE — 2500000003 HC RX 250 WO HCPCS: Performed by: SURGERY

## 2021-09-21 PROCEDURE — 3700000001 HC ADD 15 MINUTES (ANESTHESIA): Performed by: SURGERY

## 2021-09-21 PROCEDURE — 3600000002 HC SURGERY LEVEL 2 BASE: Performed by: SURGERY

## 2021-09-21 PROCEDURE — 99999 PR OFFICE/OUTPT VISIT,PROCEDURE ONLY: CPT | Performed by: SURGERY

## 2021-09-21 PROCEDURE — 6360000002 HC RX W HCPCS

## 2021-09-21 PROCEDURE — 87635 SARS-COV-2 COVID-19 AMP PRB: CPT

## 2021-09-21 PROCEDURE — 36561 INSERT TUNNELED CV CATH: CPT | Performed by: SURGERY

## 2021-09-21 PROCEDURE — 3700000000 HC ANESTHESIA ATTENDED CARE: Performed by: SURGERY

## 2021-09-21 PROCEDURE — 7100000011 HC PHASE II RECOVERY - ADDTL 15 MIN: Performed by: SURGERY

## 2021-09-21 PROCEDURE — 3209999900 FLUORO FOR SURGICAL PROCEDURES

## 2021-09-21 DEVICE — PORT SMARTPORT 8FR CT TI W/VLV SHTH: Type: IMPLANTABLE DEVICE | Site: CHEST | Status: FUNCTIONAL

## 2021-09-21 RX ORDER — MIDAZOLAM HYDROCHLORIDE 1 MG/ML
INJECTION INTRAMUSCULAR; INTRAVENOUS PRN
Status: DISCONTINUED | OUTPATIENT
Start: 2021-09-21 | End: 2021-09-21 | Stop reason: SDUPTHER

## 2021-09-21 RX ORDER — FENTANYL CITRATE 50 UG/ML
INJECTION, SOLUTION INTRAMUSCULAR; INTRAVENOUS PRN
Status: DISCONTINUED | OUTPATIENT
Start: 2021-09-21 | End: 2021-09-21 | Stop reason: SDUPTHER

## 2021-09-21 RX ORDER — PROPOFOL 10 MG/ML
INJECTION, EMULSION INTRAVENOUS PRN
Status: DISCONTINUED | OUTPATIENT
Start: 2021-09-21 | End: 2021-09-21 | Stop reason: SDUPTHER

## 2021-09-21 RX ORDER — SODIUM CHLORIDE 9 MG/ML
INJECTION, SOLUTION INTRAVENOUS CONTINUOUS
Status: DISCONTINUED | OUTPATIENT
Start: 2021-09-21 | End: 2021-09-21 | Stop reason: HOSPADM

## 2021-09-21 RX ORDER — SODIUM CHLORIDE 0.9 % (FLUSH) 0.9 %
5-40 SYRINGE (ML) INJECTION EVERY 12 HOURS SCHEDULED
Status: DISCONTINUED | OUTPATIENT
Start: 2021-09-21 | End: 2021-09-21 | Stop reason: HOSPADM

## 2021-09-21 RX ORDER — HEPARIN SODIUM (PORCINE) LOCK FLUSH IV SOLN 100 UNIT/ML 100 UNIT/ML
SOLUTION INTRAVENOUS PRN
Status: DISCONTINUED | OUTPATIENT
Start: 2021-09-21 | End: 2021-09-21 | Stop reason: ALTCHOICE

## 2021-09-21 RX ORDER — LIDOCAINE HYDROCHLORIDE AND EPINEPHRINE 10; 10 MG/ML; UG/ML
INJECTION, SOLUTION INFILTRATION; PERINEURAL PRN
Status: DISCONTINUED | OUTPATIENT
Start: 2021-09-21 | End: 2021-09-21 | Stop reason: ALTCHOICE

## 2021-09-21 RX ORDER — SODIUM CHLORIDE 9 MG/ML
25 INJECTION, SOLUTION INTRAVENOUS PRN
Status: DISCONTINUED | OUTPATIENT
Start: 2021-09-21 | End: 2021-09-21 | Stop reason: HOSPADM

## 2021-09-21 RX ORDER — PROPOFOL 10 MG/ML
INJECTION, EMULSION INTRAVENOUS CONTINUOUS PRN
Status: DISCONTINUED | OUTPATIENT
Start: 2021-09-21 | End: 2021-09-21 | Stop reason: SDUPTHER

## 2021-09-21 RX ORDER — SODIUM CHLORIDE 0.9 % (FLUSH) 0.9 %
5-40 SYRINGE (ML) INJECTION PRN
Status: DISCONTINUED | OUTPATIENT
Start: 2021-09-21 | End: 2021-09-21 | Stop reason: HOSPADM

## 2021-09-21 RX ORDER — LIDOCAINE HYDROCHLORIDE 20 MG/ML
INJECTION, SOLUTION EPIDURAL; INFILTRATION; INTRACAUDAL; PERINEURAL PRN
Status: DISCONTINUED | OUTPATIENT
Start: 2021-09-21 | End: 2021-09-21 | Stop reason: SDUPTHER

## 2021-09-21 RX ADMIN — PROPOFOL 150 MCG/KG/MIN: 10 INJECTION, EMULSION INTRAVENOUS at 09:54

## 2021-09-21 RX ADMIN — PROPOFOL 50 MG: 10 INJECTION, EMULSION INTRAVENOUS at 09:54

## 2021-09-21 RX ADMIN — LIDOCAINE HYDROCHLORIDE 60 MG: 20 INJECTION, SOLUTION EPIDURAL; INFILTRATION; INTRACAUDAL; PERINEURAL at 09:57

## 2021-09-21 RX ADMIN — Medication 2000 MG: at 09:55

## 2021-09-21 RX ADMIN — LIDOCAINE HYDROCHLORIDE 40 MG: 20 INJECTION, SOLUTION EPIDURAL; INFILTRATION; INTRACAUDAL; PERINEURAL at 09:54

## 2021-09-21 RX ADMIN — SODIUM CHLORIDE: 9 INJECTION, SOLUTION INTRAVENOUS at 09:00

## 2021-09-21 RX ADMIN — FENTANYL CITRATE 25 MCG: 50 INJECTION, SOLUTION INTRAMUSCULAR; INTRAVENOUS at 09:54

## 2021-09-21 RX ADMIN — MIDAZOLAM 1 MG: 1 INJECTION INTRAMUSCULAR; INTRAVENOUS at 09:45

## 2021-09-21 ASSESSMENT — PAIN SCALES - GENERAL: PAINLEVEL_OUTOF10: 0

## 2021-09-21 ASSESSMENT — ENCOUNTER SYMPTOMS: SHORTNESS OF BREATH: 0

## 2021-09-21 NOTE — H&P
Patient's Name/Date of Birth: Nata Kaur / 1955    Date: 2021    PCP: Jacinto Morales DO    No chief complaint on file. HPI:  Patient recentlt diagnosed with colon cancer, s/p open right hemicolectomy. Admitted for mediport insertion for charlie=ous acess in anticipation of postop chemotherapy. Patient's medications, allergies, past medical, surgical, social and family histories were reviewed and updated as appropriate.     No Known Allergies    Past Medical History:   Diagnosis Date    Anemia     Cerebral artery occlusion with cerebral infarction ([facility] [address] 2021    saskia hanson    Chronic back pain     Hyperlipidemia 2010    Hypertension     Obesity     Osteoarthritis     Tobacco abuse         Past Surgical History:   Procedure Laterality Date    APPENDECTOMY      COLECTOMY Right 2021    OPEN RIGHT HEMICOLECTOMY performed by Boyd Lopez MD at [address] [place] [address]    UPPER GASTROINTESTINAL ENDOSCOPY N/A 2021    COLONOSCOPY performed by Jeff Carey MD at [facility] OR        Social History     Tobacco Use    Smoking status: Former Smoker     Packs/day: 1.00     Years: 48.00     Pack years: 48.00     Types: Cigarettes     Quit date: 2021     Years since quittin.0    Smokeless tobacco: Never Used   Substance Use Topics    Alcohol use: No       Current Facility-Administered Medications   Medication Dose Route Frequency Provider Last Rate Last Admin    0.9 % sodium chloride infusion   IntraVENous Continuous Geoff Ledbetter MD        sodium chloride flush 0.9 % injection 5-40 mL  5-40 mL IntraVENous 2 times per day Geoff Ledbetter MD        sodium chloride flush 0.9 % injection 5-40 mL  5-40 mL IntraVENous PRN Geoff Ledbetter MD        0.9 % sodium chloride infusion  25 mL IntraVENous PRN Geoff Ledbetter MD        ceFAZolin (ANCEF) 2000 mg in sterile water 20 mL IV syringe  2,000 mg IntraVENous On Call to [address] [place] [address] Sonia Becker MD           Review of Systems  Constitutional: negative  Eyes: negative  Ears, nose, mouth, throat, and face: negative  Respiratory: negative  Cardiovascular: negative  Gastrointestinal: negative  Genitourinary:negative  Integument/breast: negative  Hematologic/lymphatic: negative  Musculoskeletal:negative  Neurological: negative  Allergic/Immunologic: negative    Physical exam:  BP (!) 188/59   Pulse 67   Temp 97.6 °F (36.4 °C) (Temporal)   Resp 17   Ht 5' (1.524 m)   Wt 155 lb (70.3 kg)   LMP 02/14/2011   SpO2 99%   BMI 30.27 kg/m²   General appearance: no acute distress  Head:NCAT, EOMI, PERRLA, conjunctiva pink  Neck: no masses, supple  Lungs: CTABL  Heart: RRR  Abdomen: soft, nondistended, nontender, no guarding, no peritoneal signs, normoactive bowel sounds, well healed abdominal wound  Extremities:no edema  Neuro exam: normal  Skin: no lesions, no rashes    Assessment/Plan:  .proceed with mediport insertion  The procedure risks, benfits, possible complications and alternative options where explained to the patient, she understands and agrees to proceed with surgery. No follow-ups on file. No name on file.       Send copy of H&P to PCP, Kacy Leggett,

## 2021-09-21 NOTE — OP NOTE
[address] [address]                                OPERATIVE REPORT    PATIENT NAME: Rebecca Finn                   :        1955  MED REC NO:   [id number]                            ROOM:  ACCOUNT NO:   [de-identified]                           ADMIT DATE: 2021  PROVIDER:     Nadir Spencer MD    DATE OF PROCEDURE:  2021    PREOPERATIVE DIAGNOSIS:  Colon cancer. POSTOPERATIVE DIAGNOSIS:  Colon cancer. PROCEDURES PERFORMED:  1. MediPort insertion. 2.  Intraoperative fluoroscopy. SURGEON:  Nadir Spencer M.D. INDICATIONS:  A 61-year-old female patient recently diagnosed with colon  cancer, status post open right hemicolectomy. I was consulted to see  the patient for a MediPort insertion for venous access for anticipated  postoperative chemotherapy. Procedure risks and benefits, possible  complications, and alternatives options were fully explained to the  patient who understood and agreed to proceed with the surgery. ESTIMATED BLOOD LOSS:  Minimal.    DESCRIPTION OF PROCEDURE:  With the patient in the supine position on  the operating table, after adequate sedation was obtained by Anesthesia,  the operative area was prepped with DuraPrep and the patient was draped  in the usual sterile manner. The right subclavian vein was accessed  through a percutaneous puncture over the inferior aspect of the right  clavicle. Guidewire was inserted through a needle into the superior  vena cava. Position of the guidewire was verified with the  intraoperative fluoroscopy. The needle was withdrawn leaving the  guidewire in place. A 2.5-cm transverse skin incision was made over the  right infraclavicular region. Skin incision was deepened through the  subcutaneous tissue down to the _____ anterior chest wall fascia. A  pouch was created below the incision, large enough to accommodate the  port.

## 2021-09-21 NOTE — ANESTHESIA PRE PROCEDURE
Department of Anesthesiology  Preprocedure Note       Name:  Shan Nugent   Age:  73 y.o.  :  1955                                          MRN:  [id number]         Date:  2021      Surgeon: Carmelita De La Rosa):  Josse Baker MD    Procedure: Procedure(s): MEDIPORT INSERTION    Medications prior to admission:   Prior to Admission medications    Medication Sig Start Date End Date Taking? Authorizing Provider   apixaban (ELIQUIS) 5 MG TABS tablet Take 1 tablet by mouth 2 times daily 21   Carolynn Lopez MD   ferrous sulfate (IRON 325) 325 (65 Fe) MG tablet Take 1 tablet by mouth daily (with breakfast) 21   Carolynn Lopez MD   Incontinence Supply Disposable MISC Large size 21 DO Vonnie   atorvastatin (LIPITOR) 40 MG tablet Take 1 tablet by mouth nightly 21 DO Vonnie   lisinopril (PRINIVIL;ZESTRIL) 5 MG tablet Take 1 tablet by mouth daily 21 DO Vonnie   Blood Pressure KIT 1 each by Does not apply route daily 5/10/21   Aleena Vonnie,        Current medications:    No current outpatient medications on file. No current facility-administered medications for this visit.        Allergies:  No Known Allergies    Problem List:    Patient Active Problem List   Diagnosis Code    Tobacco abuse Z72.0    Hyperlipidemia E78.5    Stroke-like symptoms R29.90    Encephalopathy acute G93.40    Cerebrovascular accident (CVA) due to embolism of right middle cerebral artery (HCC) I63.411    Acute deep vein thrombosis (DVT) of calf muscle vein of left lower extremity (HCC) I82.462    PFO (patent foramen ovale) Q21.1    Malignant neoplasm of ascending colon (HCC) C18.2       Past Medical History:        Diagnosis Date    Anemia     Cerebral artery occlusion with cerebral infarction ([facility] [address] 2021    saskia hanson    Chronic back pain     Hyperlipidemia 2010    Hypertension     Obesity     Osteoarthritis     Tobacco abuse 72 hours. Coags:   Lab Results   Component Value Date    PROTIME 12.9 08/23/2021    INR 1.2 08/23/2021    APTT 28.3 04/26/2021       HCG (If Applicable): No results found for: PREGTESTUR, PREGSERUM, HCG, HCGQUANT     ABGs: No results found for: PHART, PO2ART, LHN2GRA, AVJ8OSR, BEART, B8KLDISY     Type & Screen (If Applicable):  No results found for: LABABO, LABRH    Drug/Infectious Status (If Applicable):  No results found for: HIV, HEPCAB    COVID-19 Screening (If Applicable):   Lab Results   Component Value Date    COVID19 Not Detected 08/16/2021     Impression   1.  Fair large cecal neoplasm causing encasement of the central lumen of the   bowel in the cecal area, with the extension and encasement into the terminal   ileum and the proximal ascending colon.       2.  Presence of satellite mesenteric adenopathy adjacent the cecal area, most   likely metastatic.       3.  Some inflammatory reaction surrounds the pericecal spaces.  The could not   conspicuously identified the appendix.  The small well-contained air density   seen peripherally to the cecum which can represent remnant of an appendix   also involved by cecal mass or a small area of well contained localized   perforation (images: A2/34; A3/90 A1/121). .               Anesthesia Evaluation  Patient summary reviewed and Nursing notes reviewed no history of anesthetic complications:   Airway: Mallampati: II  TM distance: >3 FB   Neck ROM: full  Mouth opening: > = 3 FB Dental:    (+) edentulous      Pulmonary: breath sounds clear to auscultation      (-) shortness of breath  Smoker: 50 pyhx. Patient did not smoke on day of surgery.                 ROS comment: FORMER SMOKER  PE comment: Prolonged expiratory phase Cardiovascular:  Exercise tolerance: poor (<4 METS),   (+) hypertension: moderate, murmur (Grade II), hyperlipidemia    (-)  MEZA    ECG reviewed  Rhythm: regular  Rate: normal  Echocardiogram reviewed         Beta Blocker:  Not on Beta Blocker      ROS comment: HISTORY OF PFO     Neuro/Psych:   (+) CVA: no interval change, headaches: migraine headaches,              ROS comment: Acute encephalopathy with mental status change    Ambulatory dysfunction GI/Hepatic/Renal: Neg GI/Hepatic/Renal ROS           ROS comment: UTI    Acute gastroenteritis    Per Oncology:  HPI:  Rufina Quezada a 73 y. o. female with hx of cerebral infarction (on Eliquis last dose 1200) who presented 8/17 following a fall and AMS. She was found to have a fever of 104 in ED and admitted for management of sepsis. CT-AP was completed to look for intraabdominal source of infection, which showed a large cecal mass with extension into and encasement of the terminal ileum. Hb has been low throughout admission 6.2 on 8/18 requiring transfusion of prbc. .   Endo/Other:    (+) blood dyscrasia: anticoagulation therapy, arthritis: OA., malignancy/cancer (Colon). Pt had no PAT visit        ROS comment: Chronic back pain     Abdominal:         (-) obese       Vascular:   + PVD, aortic or cerebral, DVT, . Other Findings:             Anesthesia Plan      MAC     ASA 3       Induction: intravenous. MIPS: Prophylactic antiemetics administered. Anesthetic plan and risks discussed with patient. Plan discussed with CRNA.                 Jose R Garrett MD   9/21/2021        Jose R Garrett MD  September 21, 2021  8:26 AM

## 2021-09-21 NOTE — PROGRESS NOTES
Patient discharged home with mediport card and booklet, xray read and cleared. They verbalize understanding of discharge instructions and have no further questions at this time.

## 2021-09-21 NOTE — BRIEF OP NOTE
Brief Postoperative Note      Patient: Luz Maria Arnett  YOB: 1955  MRN: [id number]    Date of Procedure: 9/21/2021    Pre-Op Diagnosis: COLON CANCER    Post-Op Diagnosis: Same       Procedure(s): MEDIPORT INSERTION    Surgeon(s):  Maranda Marlow MD    Assistant:  * No surgical staff found *    Anesthesia: Monitor Anesthesia Care    Estimated Blood Loss (mL): Minimal    Complications: None    Specimens:   * No specimens in log *    Implants:  Implant Name Type Inv. Item Serial No.  Lot No. LRB No. Used Action   PORT SMARTPORT 8FR CT TI W/VLV [facility] Port PORT SMARTPORT 8FR CT TI W/VLV [facility]  [employer] [facility] [id number] Right 1 Implanted         Drains:   Negative Pressure Wound Therapy Abdomen (Active)   Wound Type Surgical 08/26/21 0047   Unit Type Issac 08/25/21 2215   Dressing Type Other (Comment) 08/26/21 2045   Dressing Status Clean;Dry; Intact 08/26/21 2045   Wound Assessment Other (Comment) 08/25/21 2215   Sandra-wound Assessment Other (Comment) 08/25/21 2215       Urethral Catheter (Active)   Catheter Indications Need for fluid volume management of the critically ill patient in a critical care setting 08/17/21 0802   Site Assessment No urethral drainage 08/17/21 0802   Urine Color Yellow 08/20/21 2300   Urine Appearance Clear 08/20/21 2300   Urine Odor Malodorous 08/20/21 2300   Output (mL) 550 mL 08/19/21 0912       [REMOVED] Urethral Catheter 16 fr (Removed)   Urine Color Yellow 08/24/21 2037   Urine Appearance Clear 08/24/21 2037   Output (mL) 700 mL 08/24/21 2037       Findings:     Electronically signed by Maranda Marlow MD on 9/21/2021 at 10:37 AM

## 2021-09-22 NOTE — ANESTHESIA POSTPROCEDURE EVALUATION
Department of Anesthesiology  Postprocedure Note    Patient: Jw Mckeon  MRN: [id number]  YOB: 1955  Date of evaluation: 9/22/2021  Time:  8:16 AM     Procedure Summary     Date: 09/21/21 Room / Location: [facility] 01 / 106 [facility]    Anesthesia Start: 0945 Anesthesia Stop: 7545    Procedure: MEDIPORT INSERTION (N/A Chest) Diagnosis: (COLON CANCER)    Surgeons: Berry Abdi MD Responsible Provider: Kathy De La Rosa MD    Anesthesia Type: MAC ASA Status: 3          Anesthesia Type: MAC    Filomena Phase I: Filomena Score: 10    Filomena Phase II: Filomena Score: 10    Last vitals: Reviewed and per EMR flowsheets.        Anesthesia Post Evaluation    Patient location during evaluation: PACU  Patient participation: complete - patient participated  Level of consciousness: awake and alert  Airway patency: patent  Nausea & Vomiting: no vomiting and no nausea  Complications: no  Cardiovascular status: hemodynamically stable  Respiratory status: acceptable  Hydration status: stable

## 2021-10-01 ENCOUNTER — TELEPHONE (OUTPATIENT)
Dept: FAMILY MEDICINE CLINIC | Age: 66
End: 2021-10-01
Payer: MEDICARE

## 2021-10-01 DIAGNOSIS — A41.9 SEPTICEMIA DURING LABOR (HCC): Primary | ICD-10-CM

## 2021-10-01 PROCEDURE — G0180 MD CERTIFICATION HHA PATIENT: HCPCS | Performed by: INTERNAL MEDICINE

## 2021-10-11 PROCEDURE — G0179 MD RECERTIFICATION HHA PT: HCPCS | Performed by: INTERNAL MEDICINE

## 2021-10-15 ENCOUNTER — TELEPHONE (OUTPATIENT)
Dept: FAMILY MEDICINE CLINIC | Age: 66
End: 2021-10-15
Payer: MEDICARE

## 2021-10-15 DIAGNOSIS — A41.9 SEPTICEMIA DURING LABOR (HCC): Primary | ICD-10-CM

## 2021-10-18 ENCOUNTER — TELEPHONE (OUTPATIENT)
Dept: FAMILY MEDICINE CLINIC | Age: 66
End: 2021-10-18

## 2021-10-18 NOTE — TELEPHONE ENCOUNTER
----- Message from Prudencio December sent at 10/18/2021 11:52 AM EDT -----  Subject: Referral Request    QUESTIONS   Reason for referral request? Would like to have additional Physical   therapy with Lacy. Does not want [facility]. She did not feel they did   a good job. Would like to have this started as soon as possible. Need to   help with mobility per Ola Scheuermann- Daughter in [place]. Has the physician seen you for this condition before? Yes  Select a date? 2021-07-14  Select the Provider the patient wants to be referred to, if known (PCP or   Specialist)? Outside Physician - [address] [place] [address]   Preferred Specialist (if applicable)? Do you already have an appointment scheduled? Yes  Select Scheduled Date? 2021-10-19  Select Scheduled Physician? Daina Souza   Additional Information for Provider?   ---------------------------------------------------------------------------  --------------  Cecily MONTIEL  What is the best way for the office to contact you? OK to leave message on   voicemail  Preferred Call Back Phone Number?  [phone]

## 2021-10-19 ENCOUNTER — OFFICE VISIT (OUTPATIENT)
Dept: FAMILY MEDICINE CLINIC | Age: 66
End: 2021-10-19
Payer: MEDICARE

## 2021-10-19 VITALS
SYSTOLIC BLOOD PRESSURE: 140 MMHG | OXYGEN SATURATION: 98 % | HEART RATE: 75 BPM | HEIGHT: 62 IN | TEMPERATURE: 97.7 F | RESPIRATION RATE: 16 BRPM | WEIGHT: 168.4 LBS | DIASTOLIC BLOOD PRESSURE: 70 MMHG | BODY MASS INDEX: 30.99 KG/M2

## 2021-10-19 DIAGNOSIS — I82.462 ACUTE DEEP VEIN THROMBOSIS (DVT) OF CALF MUSCLE VEIN OF LEFT LOWER EXTREMITY (HCC): ICD-10-CM

## 2021-10-19 DIAGNOSIS — B37.2 YEAST INFECTION OF THE SKIN: ICD-10-CM

## 2021-10-19 DIAGNOSIS — E78.49 OTHER HYPERLIPIDEMIA: ICD-10-CM

## 2021-10-19 DIAGNOSIS — I63.411 CEREBROVASCULAR ACCIDENT (CVA) DUE TO EMBOLISM OF RIGHT MIDDLE CEREBRAL ARTERY (HCC): ICD-10-CM

## 2021-10-19 DIAGNOSIS — Q21.12 PFO (PATENT FORAMEN OVALE): ICD-10-CM

## 2021-10-19 DIAGNOSIS — C18.2 MALIGNANT NEOPLASM OF ASCENDING COLON (HCC): Primary | ICD-10-CM

## 2021-10-19 DIAGNOSIS — R26.2 AMBULATORY DYSFUNCTION: ICD-10-CM

## 2021-10-19 DIAGNOSIS — D50.9 IRON DEFICIENCY ANEMIA, UNSPECIFIED IRON DEFICIENCY ANEMIA TYPE: ICD-10-CM

## 2021-10-19 PROCEDURE — 3017F COLORECTAL CA SCREEN DOC REV: CPT | Performed by: INTERNAL MEDICINE

## 2021-10-19 PROCEDURE — 4040F PNEUMOC VAC/ADMIN/RCVD: CPT | Performed by: INTERNAL MEDICINE

## 2021-10-19 PROCEDURE — G8484 FLU IMMUNIZE NO ADMIN: HCPCS | Performed by: INTERNAL MEDICINE

## 2021-10-19 PROCEDURE — 1036F TOBACCO NON-USER: CPT | Performed by: INTERNAL MEDICINE

## 2021-10-19 PROCEDURE — G8417 CALC BMI ABV UP PARAM F/U: HCPCS | Performed by: INTERNAL MEDICINE

## 2021-10-19 PROCEDURE — G8427 DOCREV CUR MEDS BY ELIG CLIN: HCPCS | Performed by: INTERNAL MEDICINE

## 2021-10-19 PROCEDURE — 1090F PRES/ABSN URINE INCON ASSESS: CPT | Performed by: INTERNAL MEDICINE

## 2021-10-19 PROCEDURE — G8400 PT W/DXA NO RESULTS DOC: HCPCS | Performed by: INTERNAL MEDICINE

## 2021-10-19 PROCEDURE — 1123F ACP DISCUSS/DSCN MKR DOCD: CPT | Performed by: INTERNAL MEDICINE

## 2021-10-19 PROCEDURE — 99214 OFFICE O/P EST MOD 30 MIN: CPT | Performed by: INTERNAL MEDICINE

## 2021-10-19 RX ORDER — FERROUS SULFATE 325(65) MG
325 TABLET ORAL
Qty: 30 TABLET | Refills: 5 | Status: SHIPPED
Start: 2021-10-19 | End: 2022-06-21

## 2021-10-19 RX ORDER — LISINOPRIL 5 MG/1
5 TABLET ORAL DAILY
Qty: 30 TABLET | Refills: 5 | Status: SHIPPED
Start: 2021-10-19 | End: 2022-06-16

## 2021-10-19 RX ORDER — NYSTATIN 100000 U/G
CREAM TOPICAL
Qty: 30 G | Refills: 5 | Status: SHIPPED | OUTPATIENT
Start: 2021-10-19

## 2021-10-19 RX ORDER — DIPHENOXYLATE HYDROCHLORIDE AND ATROPINE SULFATE 2.5; .025 MG/1; MG/1
TABLET ORAL
COMMUNITY
Start: 2021-09-27

## 2021-10-19 RX ORDER — ONDANSETRON HYDROCHLORIDE 8 MG/1
TABLET, FILM COATED ORAL
COMMUNITY
Start: 2021-09-27

## 2021-10-19 RX ORDER — ATORVASTATIN CALCIUM 40 MG/1
40 TABLET, FILM COATED ORAL NIGHTLY
Qty: 30 TABLET | Refills: 5 | Status: SHIPPED
Start: 2021-10-19 | End: 2022-05-24

## 2021-10-19 NOTE — PROGRESS NOTES
[facility] PRIMARY CARE  [address] [place] [address]  [place] [address]  Dept: [phone]  Dept Fax: [phone]     NAME: Katey Lawson        :  1955        MRN:  [id number]    Chief Complaint   Patient presents with    Rash     right breast skin irritation underneath breast, currently under chemo for colon cancer    Flu Vaccine     declined       Subjective     History of Present Illness  Katey Lawson is a 73 y.o. female who presents today for routine follow up and medication refill. Patient has had an extensive hospitalization since her last visit with me. She had initially presented to the hospital on 2021 for a mental status change, fever, and possible UTI. She was started on antibiotics and secondary to blood culture results and abdominal CT was obtained. CT incidentally showed a large cecal mass nearly obstructing her large intestine. GI and general surgery were consulted and patient ended up undergoing a right hemicolectomy to remove the mass. Final pathology returned revealing invasive moderately differentiated adenocarcinoma. Local lymph nodes were positive but no metastasis was seen on imaging. Patient is now following with oncology and undergoing chemotherapy. Patient was anemic during her hospitalization and initially her Eliquis and Plavix were stopped and she received a blood transfusion. On discharge patient's Eliquis was resumed Plavix was discontinued. Patient does have a Mediport and is following at the blood and cancer center for her chemotherapy. Patient states that she is generally feeling well despite everything that occurred. Her main concern today is that she does have a irritation underneath her breasts bilaterally. There is no skin breakdown but is acutely irritated and painful. Patient does try to keep the areas clean and dry as possible. Review of Systems  Please see HPI above.  All bolded are positive.   Gen: fever, chills, fatigue, weakness, diaphoresis, unintentional weight change  Head: headache, vision change, hearing loss  Chest: chest pain/heaviness, palpitations  Lungs: shortness of breath, wheezing, coughing, hemoptysis  Abdomen: abdominal pain, nausea, vomiting, diarrhea, constipation, melena, hematochezia, hematemesis, loss of appetite  Extremities: lower extremity edema, myalgias, arthralgias  Urinary: dysuria, hematuria, weak flow, increase in frequency  Neurologic: lightheadedness, dizziness, confusion, syncope  Endocrine: polydipsia, polyuria, heat or cold intolerance  Psychiatric: depression, suicidal ideation, anxiety  Derm: Rashes, ulcers, burns    Past Medical Hx:  Past Medical History:   Diagnosis Date    Anemia     Cerebral artery occlusion with cerebral infarction ([facility] [address] 05/2021    weknreji Manuel Quails walker    Chronic back pain     Hyperlipidemia 03/2010    Hypertension     Obesity     Osteoarthritis     Tobacco abuse        Past Surgical Hx:  Past Surgical History:   Procedure Laterality Date    APPENDECTOMY      COLECTOMY Right 8/23/2021    OPEN RIGHT HEMICOLECTOMY performed by Sepideh Hinds [facility] at [facility] OR    COLONOSCOPY      PORT SURGERY N/A 9/21/2021    MEDIPORT INSERTION performed by Delfino Correa MD at [address] [place] [address] ENDOSCOPY N/A 8/22/2021    COLONOSCOPY performed by Darryle Goo, MD at [facility] OR       Family Hx:  Family History   Problem Relation Age of Onset    Diabetes Sister     High Blood Pressure Sister     Cancer Sister         Thryoid    Diabetes Sister     Heart Disease Sister 28        Heart Failure    Cancer Brother     Diabetes Brother     Heart Disease Brother     High Blood Pressure Brother     High Cholesterol Brother        Social Hx:  Social History     Tobacco Use    Smoking status: Former Smoker     Packs/day: 1.00     Years: 48.00     Pack years: 48.00     Types: Cigarettes     Quit date: 2021     Years since quittin.1    Smokeless tobacco: Never Used   Substance Use Topics    Alcohol use: No       Home Medications:  Current Outpatient Medications   Medication Sig Dispense Refill    diphenoxylate-atropine (LOMOTIL) 2.5-0.025 MG per tablet TAKE 2 TABLETS BY MOUTH EVERY 6 HOURS AS NEEDED FOR DIARRHEA. MAX 8 TABLETS A DAY      ondansetron (ZOFRAN) 8 MG tablet       nystatin (MYCOSTATIN) 049510 UNIT/GM cream Apply topically 2 times daily. 30 g 5    apixaban (ELIQUIS) 5 MG TABS tablet Take 1 tablet by mouth 2 times daily 60 tablet 5    atorvastatin (LIPITOR) 40 MG tablet Take 1 tablet by mouth nightly 30 tablet 5    lisinopril (PRINIVIL;ZESTRIL) 5 MG tablet Take 1 tablet by mouth daily 30 tablet 5    ferrous sulfate (IRON 325) 325 (65 Fe) MG tablet Take 1 tablet by mouth daily (with breakfast) 30 tablet 5    Incontinence Supply Disposable MISC Large size 90 each 5    Blood Pressure KIT 1 each by Does not apply route daily 1 kit 0     No current facility-administered medications for this visit. Allergies:  No Known Allergies    Objective     Vitals:    10/19/21 0933   BP: (!) 140/70   Pulse: 75   Resp: 16   Temp: 97.7 °F (36.5 °C)   TempSrc: Temporal   SpO2: 98%   Weight: 168 lb 6.4 oz (76.4 kg)   Height: 5' 2\" (1.575 m)        Physical Exam  General: Awake, alert, and oriented to person, place, time, and purpose, appears stated age and cooperative, No acute distress  Head: Normocephalic, atraumatic  Eyes: conjunctivae/corneas clear, EOMI  Mouth: Mucous membranes moist with no pharyngeal exudate or erythema  Neck: no JVD, no adenopathy, no carotid bruit, supple, symmetrical, trachea midline  Back: symmetric, ROM normal, No CVA tenderness.   Lungs: clear to auscultation bilaterally without wheezes, rales, or rhonchi  Heart: regular rate and rhythm, S1, S2 normal, no murmur, click, rub or gallop  Chest: Mediport in place  Abdomen: soft, non-tender; bowel sounds normal; no masses,  no organomegaly, surgical incisions are well-healed  Extremities: atraumatic, no cyanosis, no edema, 2+ pulses palpated in all 4 extremities  Skin: color, texture, turgor within normal limits. No rashes or lesions or normal  Neurologic:speech appropriate, moves all 4 extremities, normal muscle strength and tone, CN 2-12 grossly intact    Labs:  Lab Results   Component Value Date    WBC 4.8 08/28/2021    HGB 9.2 (L) 08/28/2021    HCT 32.4 (L) 08/28/2021     08/28/2021     08/28/2021    K 3.9 08/28/2021     08/28/2021    CREATININE 0.8 08/28/2021    BUN 8 08/28/2021    CO2 25 08/28/2021    GLUCOSE 95 08/28/2021    ALT <5 08/16/2021    AST 13 08/16/2021    INR 1.2 08/23/2021     Lab Results   Component Value Date    TSH 0.354 04/27/2021     Lab Results   Component Value Date    TRIG 84 07/14/2021    TRIG 125 04/27/2021    TRIG 168 (H) 06/12/2012     Lab Results   Component Value Date    HDL 25 07/14/2021    HDL 30 04/27/2021    HDL 35.0 (A) 06/12/2012     Lab Results   Component Value Date    LDLCALC 55 07/14/2021    LDLCALC 101 (H) 04/27/2021    LDLCALC 157 (H) 06/12/2012     Lab Results   Component Value Date    LABA1C 5.7 (H) 04/27/2021     Lab Results   Component Value Date    INR 1.2 08/23/2021    INR 1.3 08/21/2021    INR 1.2 04/26/2021    PROTIME 12.9 (H) 08/23/2021    PROTIME 14.5 (H) 08/21/2021    PROTIME 12.9 (H) 04/26/2021      *All recent labs were reviewed. Please see electronic chart for a more comprehensive set of labs    Radiology:  XR CHEST PORTABLE    Result Date: 9/21/2021  EXAMINATION: ONE XRAY VIEW OF THE CHEST 9/21/2021 10:17 am COMPARISON: 08/16/2021 HISTORY: ORDERING SYSTEM PROVIDED HISTORY: post-op port placement TECHNOLOGIST PROVIDED HISTORY: Reason for exam:->post-op port placement FINDINGS: The cardiomediastinal silhouette is unremarkable. No infiltrate, effusion, or pneumothorax. Right MediPort catheter tip is in the superior vena cava.  No acute osseous abnormality. No radiographic evidence of acute cardiopulmonary disease process     Fluoro For Surgical Procedures    Result Date: 9/21/2021  EXAMINATION: SPOT FLUOROSCOPIC IMAGES 9/21/2021 10:19 am TECHNIQUE: Fluoroscopy was provided by the radiology department for procedure. Radiologist was not present during examination. FLUOROSCOPY DOSE AND TYPE OR TIME AND EXPOSURES: Fluoroscopy time equals 0.2 minutes. Total dose equals 1.1 mGy COMPARISON: None HISTORY: ORDERING SYSTEM PROVIDED HISTORY: Pain TECHNOLOGIST PROVIDED HISTORY: Reason for exam:->mediport insertion Intraprocedural imaging. FINDINGS: 1 spot images of the chest were obtained. Intraprocedural fluoroscopic spot images as above. See separate procedure report for more information. Assessment and Plan     Patient is a 73 y.o. female who presented to the office for follow-up. Full problem list is as follows:  Patient Active Problem List   Diagnosis    Tobacco abuse    Hyperlipidemia    Stroke-like symptoms    Encephalopathy acute    Cerebrovascular accident (CVA) due to embolism of right middle cerebral artery ([address]    Acute deep vein thrombosis (DVT) of calf muscle vein of left lower extremity (HCC)    PFO (patent foramen ovale)    Malignant neoplasm of ascending colon ([address]       Altagracia Corona was seen today for rash and flu vaccine. Diagnoses and all orders for this visit:    Malignant neoplasm of ascending colon [facility])  -     External Referral To Home Health    Ambulatory dysfunction  -     External Referral To Home Health    Yeast infection of the skin  -     nystatin (MYCOSTATIN) 181468 UNIT/GM cream; Apply topically 2 times daily. Acute deep vein thrombosis (DVT) of calf muscle vein of left lower extremity (HCC)  -     apixaban (ELIQUIS) 5 MG TABS tablet; Take 1 tablet by mouth 2 times daily    PFO (patent foramen ovale)  -     apixaban (ELIQUIS) 5 MG TABS tablet;  Take 1 tablet by mouth 2 times daily    Cerebrovascular accident (CVA) due to embolism of right middle cerebral artery (HCC)  -     apixaban (ELIQUIS) 5 MG TABS tablet; Take 1 tablet by mouth 2 times daily  -     lisinopril (PRINIVIL;ZESTRIL) 5 MG tablet; Take 1 tablet by mouth daily    Other hyperlipidemia  -     atorvastatin (LIPITOR) 40 MG tablet; Take 1 tablet by mouth nightly    Iron deficiency anemia, unspecified iron deficiency anemia type  -     ferrous sulfate (IRON 325) 325 (65 Fe) MG tablet; Take 1 tablet by mouth daily (with breakfast)        Educational materials and/or home exercises printed for patient's review and were included in patient instructions on his/her After Visit Summary and given to patient at the end of visit. Counseled regarding above diagnosis, including possible risks and complications, especially if left uncontrolled. Counseled regarding the possible side effects, risks, benefits and alternatives to treatment; patient and/or guardian verbalizes understanding, agrees, feels comfortable with and wishes to proceed with above treatment plan. Advised patient to call Tete Winters new medication issues, and read all Rx info from pharmacy to assure aware of all possible risks and side effects of any medication before taking. Patient verbalizes understanding and agrees with above counseling, assessment and plan. All questions answered.     Epifanio Fallon, DO

## 2021-10-28 ENCOUNTER — TELEPHONE (OUTPATIENT)
Dept: FAMILY MEDICINE CLINIC | Age: 66
End: 2021-10-28
Payer: MEDICARE

## 2021-10-28 DIAGNOSIS — I69.398 VERTIGO AS LATE EFFECT OF STROKE: Primary | ICD-10-CM

## 2021-10-28 DIAGNOSIS — R42 VERTIGO AS LATE EFFECT OF STROKE: Primary | ICD-10-CM

## 2021-10-28 PROCEDURE — G0179 MD RECERTIFICATION HHA PT: HCPCS | Performed by: INTERNAL MEDICINE

## 2022-03-02 ENCOUNTER — HOSPITAL ENCOUNTER (OUTPATIENT)
Dept: MAMMOGRAPHY | Age: 67
Discharge: HOME | End: 2022-03-04

## 2022-03-02 DIAGNOSIS — Z12.31 VISIT FOR SCREENING MAMMOGRAM: ICD-10-CM

## 2022-03-14 ENCOUNTER — CARE COORDINATION (OUTPATIENT)
Dept: CARE COORDINATION | Age: 67
End: 2022-03-14

## 2022-03-14 NOTE — CARE COORDINATION
ACM spoke with patient's son Ash Mckeon. Chelsea Morris is getting ready to leave for her final chemo appointment and is unavailable to speak. ACM explained care coordination to Sheila Membreno. He requests a call back tomorrow to enroll patient. ACM to speak with patient and son as requested for care coordination enrollment.

## 2022-03-15 ENCOUNTER — CARE COORDINATION (OUTPATIENT)
Dept: CARE COORDINATION | Age: 67
End: 2022-03-15

## 2022-03-15 NOTE — CARE COORDINATION
[facility] placed call to John Armando to complete care coordination enrollment for Marianne Colby. He is not presently at home and states he will call ACM in about 20 minutes to complete enrollment.

## 2022-03-16 ENCOUNTER — CARE COORDINATION (OUTPATIENT)
Dept: CARE COORDINATION | Age: 67
End: 2022-03-16

## 2022-03-16 NOTE — CARE COORDINATION
Attempted to reach patient's son Ash Mckeon by telephone. Left HIPAA compliant message requesting a return call. Will attempt to reach patient again.

## 2022-03-24 ENCOUNTER — CARE COORDINATION (OUTPATIENT)
Dept: CARE COORDINATION | Age: 67
End: 2022-03-24

## 2022-03-24 NOTE — CARE COORDINATION
Final attempt to reach josie Lr) by phone to complete enrollment in care coordination.   Will place patient on temporary exclusion list.

## 2022-05-04 ENCOUNTER — TELEPHONE (OUTPATIENT)
Dept: FAMILY MEDICINE CLINIC | Age: 67
End: 2022-05-04

## 2022-05-06 ENCOUNTER — OFFICE VISIT (OUTPATIENT)
Dept: FAMILY MEDICINE CLINIC | Age: 67
End: 2022-05-06
Payer: MEDICARE

## 2022-05-06 ENCOUNTER — HOSPITAL ENCOUNTER (OUTPATIENT)
Dept: ULTRASOUND IMAGING | Age: 67
Discharge: HOME | End: 2022-05-08
Payer: MEDICARE

## 2022-05-06 VITALS
OXYGEN SATURATION: 98 % | HEIGHT: 62 IN | DIASTOLIC BLOOD PRESSURE: 70 MMHG | TEMPERATURE: 97.8 F | SYSTOLIC BLOOD PRESSURE: 144 MMHG | WEIGHT: 163 LBS | BODY MASS INDEX: 30 KG/M2 | HEART RATE: 99 BPM | RESPIRATION RATE: 16 BRPM

## 2022-05-06 DIAGNOSIS — M79.89 LEFT LEG SWELLING: Primary | ICD-10-CM

## 2022-05-06 DIAGNOSIS — M79.89 LEFT LEG SWELLING: ICD-10-CM

## 2022-05-06 DIAGNOSIS — M71.22 POPLITEAL CYST, LEFT: ICD-10-CM

## 2022-05-06 PROCEDURE — 1090F PRES/ABSN URINE INCON ASSESS: CPT | Performed by: INTERNAL MEDICINE

## 2022-05-06 PROCEDURE — 93971 EXTREMITY STUDY: CPT

## 2022-05-06 PROCEDURE — 99213 OFFICE O/P EST LOW 20 MIN: CPT | Performed by: INTERNAL MEDICINE

## 2022-05-06 PROCEDURE — 1123F ACP DISCUSS/DSCN MKR DOCD: CPT | Performed by: INTERNAL MEDICINE

## 2022-05-06 PROCEDURE — G8417 CALC BMI ABV UP PARAM F/U: HCPCS | Performed by: INTERNAL MEDICINE

## 2022-05-06 PROCEDURE — 1036F TOBACCO NON-USER: CPT | Performed by: INTERNAL MEDICINE

## 2022-05-06 PROCEDURE — G8427 DOCREV CUR MEDS BY ELIG CLIN: HCPCS | Performed by: INTERNAL MEDICINE

## 2022-05-06 PROCEDURE — 4040F PNEUMOC VAC/ADMIN/RCVD: CPT | Performed by: INTERNAL MEDICINE

## 2022-05-06 PROCEDURE — 3017F COLORECTAL CA SCREEN DOC REV: CPT | Performed by: INTERNAL MEDICINE

## 2022-05-06 PROCEDURE — G8400 PT W/DXA NO RESULTS DOC: HCPCS | Performed by: INTERNAL MEDICINE

## 2022-05-06 RX ORDER — FUROSEMIDE 20 MG/1
20 TABLET ORAL DAILY
Qty: 2 TABLET | Refills: 0 | Status: SHIPPED | OUTPATIENT
Start: 2022-05-06 | End: 2022-05-08

## 2022-05-06 ASSESSMENT — PATIENT HEALTH QUESTIONNAIRE - PHQ9
2. FEELING DOWN, DEPRESSED OR HOPELESS: 0
SUM OF ALL RESPONSES TO PHQ QUESTIONS 1-9: 1
SUM OF ALL RESPONSES TO PHQ QUESTIONS 1-9: 1
SUM OF ALL RESPONSES TO PHQ9 QUESTIONS 1 & 2: 1
SUM OF ALL RESPONSES TO PHQ QUESTIONS 1-9: 1
1. LITTLE INTEREST OR PLEASURE IN DOING THINGS: 1
SUM OF ALL RESPONSES TO PHQ QUESTIONS 1-9: 1

## 2022-05-06 NOTE — PROGRESS NOTES
[facility] PRIMARY CARE  [address] [address]  Dept: [phone]  Dept Fax: [phone]     NAME: Bobby Hernández        :  1955        MRN:  [id number]    Chief Complaint   Patient presents with    Edema     Left leg and foot. Started about a week ago. Subjective     History of Present Illness  Bobby Hernández is a 78 y.o. female who presents today for an acute visit with the complaint of left lower leg swelling. Patient states for about the last week she has had edema of her left lower leg and foot. She denies any significant pain or redness. Denies any injury. Reports compliance with her Eliquis 5 mg twice daily. She denies missing any doses or running out of her medications. Review of Systems  Please see HPI above. All bolded are positive. Gen: fever, chills, fatigue, weakness, diaphoresis, unintentional weight change  Head: headache, vision change, hearing loss  Chest: chest pain/heaviness, palpitations  Lungs: shortness of breath, wheezing, coughing, hemoptysis  Abdomen: abdominal pain, nausea, vomiting, diarrhea, constipation, melena, hematochezia, hematemesis, loss of appetite  Extremities: lower extremity edema, myalgias, arthralgias  Urinary: dysuria, hematuria, weak flow, increase in frequency  Neurologic: lightheadedness, dizziness, confusion, syncope  Endocrine: polydipsia, polyuria, heat or cold intolerance  Psychiatric: depression, suicidal ideation, anxiety  Derm: Rashes, ulcers, burns    Home Medications:  Current Outpatient Medications   Medication Sig Dispense Refill    diphenoxylate-atropine (LOMOTIL) 2.5-0.025 MG per tablet TAKE 2 TABLETS BY MOUTH EVERY 6 HOURS AS NEEDED FOR DIARRHEA. MAX 8 TABLETS A DAY      ondansetron (ZOFRAN) 8 MG tablet       nystatin (MYCOSTATIN) 770064 UNIT/GM cream Apply topically 2 times daily.  30 g 5    apixaban (ELIQUIS) 5 MG TABS tablet Take 1 tablet by mouth 2 times daily 60 tablet 5    atorvastatin (LIPITOR) 40 MG tablet Take 1 tablet by mouth nightly 30 tablet 5    lisinopril (PRINIVIL;ZESTRIL) 5 MG tablet Take 1 tablet by mouth daily 30 tablet 5    ferrous sulfate (IRON 325) 325 (65 Fe) MG tablet Take 1 tablet by mouth daily (with breakfast) 30 tablet 5    Incontinence Supply Disposable MISC Large size 90 each 5    furosemide (LASIX) 20 MG tablet Take 1 tablet by mouth daily for 2 days For acute leg swelling. 2 tablet 0    Blood Pressure KIT 1 each by Does not apply route daily (Patient not taking: Reported on 5/6/2022) 1 kit 0     No current facility-administered medications for this visit. Allergies:  No Known Allergies    Objective     Vitals:    05/06/22 1133 05/06/22 1143   BP: (!) 154/66 (!) 144/70   Pulse: 99    Resp: 16    Temp: 97.8 °F (36.6 °C)    TempSrc: Temporal    SpO2: 98%    Weight: 163 lb (73.9 kg)    Height: 5' 2\" (1.575 m)         Physical Exam  General: Awake, alert, and oriented to person, place, time, and purpose, appears stated age and cooperative, No acute distress  Head: Normocephalic, atraumatic  Eyes: conjunctivae/corneas clear, EOMI  Mouth: Mucous membranes moist with no pharyngeal exudate or erythema  Neck: no JVD, no adenopathy, no carotid bruit, supple, symmetrical, trachea midline  Back: symmetric, ROM normal, No CVA tenderness. Lungs: clear to auscultation bilaterally without wheezes, rales, or rhonchi  Heart: regular rate and rhythm, S1, S2 normal, no murmur, click, rub or gallop  Abdomen: soft, non-tender; bowel sounds normal; no masses,  no organomegaly  Extremities: atraumatic, no cyanosis, 1-2+ edema to left lower extremity, no erythema or tenderness, negative Homans' sign  Skin: color, texture, turgor within normal limits.  No rashes or lesions or normal  Neurologic:speech appropriate, moves all 4 extremities, normal muscle strength and tone, CN 2-12 grossly intact      Assessment and Plan     Patient is a 78 y.o. female who presented to the office for an acute visit. Nicholas Javier was seen today for edema. Diagnoses and all orders for this visit:    Left leg swelling  -     US DUP LOWER EXTREMITY LEFT KEVON; Future    -Low suspicion for DVT as patient is compliant with her Eliquis.  -No signs of infection  -Stat ultrasound ordered    Educational materials and/or home exercises printed for patient's review and were included in patient instructions on his/her After Visit Summary and given to patient at the end of visit. Counseled regarding above diagnosis, including possible risks and complications, especially if left uncontrolled or untreated. Advised to present to the Emergency Department if symptoms worsen. Counseled regarding the possible side effects, risks, benefits and alternatives to treatment; patient and/or guardian verbalizes understanding, agrees, feels comfortable with and wishes to proceed with above treatment plan. Advised patient to call Antwan Shelton new medication issues, and read all Rx info from pharmacy to assure aware of all possible risks and side effects of any medication before taking. Patient verbalizes understanding and agrees with above counseling, assessment and plan. All questions answered.     Bridgette Carvalho,

## 2022-05-17 ENCOUNTER — TELEPHONE (OUTPATIENT)
Dept: SPORTS MEDICINE | Age: 67
End: 2022-05-17

## 2022-05-17 NOTE — TELEPHONE ENCOUNTER
Spoke with patient's son, patient is feeling better and does not wish to be seen in our office at this time.

## 2022-05-23 DIAGNOSIS — E78.49 OTHER HYPERLIPIDEMIA: ICD-10-CM

## 2022-05-24 RX ORDER — ATORVASTATIN CALCIUM 40 MG/1
TABLET, FILM COATED ORAL
Qty: 90 TABLET | Refills: 1 | Status: SHIPPED
Start: 2022-05-24 | End: 2022-10-10

## 2022-06-02 DIAGNOSIS — Q21.12 PFO (PATENT FORAMEN OVALE): ICD-10-CM

## 2022-06-02 DIAGNOSIS — I63.411 CEREBROVASCULAR ACCIDENT (CVA) DUE TO EMBOLISM OF RIGHT MIDDLE CEREBRAL ARTERY (HCC): ICD-10-CM

## 2022-06-02 DIAGNOSIS — I82.462 ACUTE DEEP VEIN THROMBOSIS (DVT) OF CALF MUSCLE VEIN OF LEFT LOWER EXTREMITY (HCC): ICD-10-CM

## 2022-06-02 RX ORDER — APIXABAN 5 MG/1
TABLET, FILM COATED ORAL
Qty: 60 TABLET | Refills: 5 | Status: SHIPPED | OUTPATIENT
Start: 2022-06-02

## 2022-06-16 DIAGNOSIS — I63.411 CEREBROVASCULAR ACCIDENT (CVA) DUE TO EMBOLISM OF RIGHT MIDDLE CEREBRAL ARTERY (HCC): ICD-10-CM

## 2022-06-16 RX ORDER — LISINOPRIL 5 MG/1
TABLET ORAL
Qty: 90 TABLET | Refills: 1 | Status: SHIPPED
Start: 2022-06-16 | End: 2022-10-19 | Stop reason: SDUPTHER

## 2022-06-21 DIAGNOSIS — D50.9 IRON DEFICIENCY ANEMIA, UNSPECIFIED IRON DEFICIENCY ANEMIA TYPE: ICD-10-CM

## 2022-06-21 RX ORDER — FERROUS SULFATE 325(65) MG
TABLET ORAL
Qty: 90 TABLET | Refills: 3 | Status: SHIPPED
Start: 2022-06-21 | End: 2022-10-19 | Stop reason: SDUPTHER

## 2022-09-29 ENCOUNTER — HOSPITAL ENCOUNTER (OUTPATIENT)
Dept: CT IMAGING | Age: 67
Discharge: HOME | End: 2022-10-01
Payer: MEDICARE

## 2022-09-29 ENCOUNTER — HOSPITAL ENCOUNTER (OUTPATIENT)
Age: 67
Discharge: HOME | End: 2022-09-29
Payer: MEDICARE

## 2022-09-29 DIAGNOSIS — C18.9 MALIGNANT NEOPLASM OF COLON, UNSPECIFIED PART OF COLON (HCC): ICD-10-CM

## 2022-09-29 LAB
BUN BLDV-MCNC: 10 MG/DL (ref 6–23)
CREAT SERPL-MCNC: 0.8 MG/DL (ref 0.5–1)
GFR AFRICAN AMERICAN: >60
GFR NON-AFRICAN AMERICAN: >60 ML/MIN/1.73

## 2022-09-29 PROCEDURE — 6360000004 HC RX CONTRAST MEDICATION: Performed by: RADIOLOGY

## 2022-09-29 PROCEDURE — 71260 CT THORAX DX C+: CPT

## 2022-09-29 PROCEDURE — 74177 CT ABD & PELVIS W/CONTRAST: CPT

## 2022-09-29 PROCEDURE — 84520 ASSAY OF UREA NITROGEN: CPT

## 2022-09-29 PROCEDURE — 6360000002 HC RX W HCPCS: Performed by: INTERNAL MEDICINE

## 2022-09-29 PROCEDURE — 2580000003 HC RX 258: Performed by: RADIOLOGY

## 2022-09-29 PROCEDURE — 36415 COLL VENOUS BLD VENIPUNCTURE: CPT

## 2022-09-29 PROCEDURE — 82565 ASSAY OF CREATININE: CPT

## 2022-09-29 RX ORDER — HEPARIN SODIUM (PORCINE) LOCK FLUSH IV SOLN 100 UNIT/ML 100 UNIT/ML
500 SOLUTION INTRAVENOUS PRN
Status: DISCONTINUED | OUTPATIENT
Start: 2022-09-29 | End: 2022-10-02 | Stop reason: HOSPADM

## 2022-09-29 RX ORDER — SODIUM CHLORIDE 0.9 % (FLUSH) 0.9 %
10 SYRINGE (ML) INJECTION
Status: COMPLETED | OUTPATIENT
Start: 2022-09-29 | End: 2022-09-29

## 2022-09-29 RX ADMIN — SODIUM CHLORIDE, PRESERVATIVE FREE 10 ML: 5 INJECTION INTRAVENOUS at 14:45

## 2022-09-29 RX ADMIN — HEPARIN 500 UNITS: 100 SYRINGE at 14:45

## 2022-09-29 RX ADMIN — IOHEXOL 50 ML: 240 INJECTION, SOLUTION INTRATHECAL; INTRAVASCULAR; INTRAVENOUS; ORAL at 14:29

## 2022-10-09 DIAGNOSIS — E78.49 OTHER HYPERLIPIDEMIA: ICD-10-CM

## 2022-10-10 RX ORDER — ATORVASTATIN CALCIUM 40 MG/1
TABLET, FILM COATED ORAL
Qty: 90 TABLET | Refills: 1 | Status: SHIPPED | OUTPATIENT
Start: 2022-10-10

## 2022-10-19 DIAGNOSIS — D50.9 IRON DEFICIENCY ANEMIA, UNSPECIFIED IRON DEFICIENCY ANEMIA TYPE: ICD-10-CM

## 2022-10-19 DIAGNOSIS — I63.411 CEREBROVASCULAR ACCIDENT (CVA) DUE TO EMBOLISM OF RIGHT MIDDLE CEREBRAL ARTERY (HCC): ICD-10-CM

## 2022-10-19 RX ORDER — LISINOPRIL 5 MG/1
TABLET ORAL
Qty: 90 TABLET | Refills: 0 | Status: SHIPPED | OUTPATIENT
Start: 2022-10-19

## 2022-10-19 RX ORDER — FERROUS SULFATE 325(65) MG
TABLET ORAL
Qty: 90 TABLET | Refills: 0 | Status: SHIPPED | OUTPATIENT
Start: 2022-10-19

## 2022-12-02 DIAGNOSIS — I82.462 ACUTE DEEP VEIN THROMBOSIS (DVT) OF CALF MUSCLE VEIN OF LEFT LOWER EXTREMITY (HCC): ICD-10-CM

## 2022-12-02 DIAGNOSIS — Q21.12 PFO (PATENT FORAMEN OVALE): ICD-10-CM

## 2022-12-02 DIAGNOSIS — I63.411 CEREBROVASCULAR ACCIDENT (CVA) DUE TO EMBOLISM OF RIGHT MIDDLE CEREBRAL ARTERY (HCC): ICD-10-CM

## 2022-12-02 NOTE — TELEPHONE ENCOUNTER
Last Appointment:  5/6/2022  Future Appointments   Date Time Provider Shadi Rowell   12/5/2022 10:00 AM Tone Garvey  [address]

## 2022-12-04 RX ORDER — APIXABAN 5 MG/1
TABLET, FILM COATED ORAL
Qty: 60 TABLET | Refills: 5 | Status: SHIPPED | OUTPATIENT
Start: 2022-12-04

## 2022-12-06 ENCOUNTER — OFFICE VISIT (OUTPATIENT)
Dept: FAMILY MEDICINE CLINIC | Age: 67
End: 2022-12-06

## 2022-12-06 VITALS
BODY MASS INDEX: 35.33 KG/M2 | SYSTOLIC BLOOD PRESSURE: 138 MMHG | HEART RATE: 86 BPM | WEIGHT: 192 LBS | DIASTOLIC BLOOD PRESSURE: 82 MMHG | TEMPERATURE: 97.6 F | HEIGHT: 62 IN | OXYGEN SATURATION: 98 % | RESPIRATION RATE: 16 BRPM

## 2022-12-06 DIAGNOSIS — Z00.00 INITIAL MEDICARE ANNUAL WELLNESS VISIT: Primary | ICD-10-CM

## 2022-12-06 DIAGNOSIS — I82.462 ACUTE DEEP VEIN THROMBOSIS (DVT) OF CALF MUSCLE VEIN OF LEFT LOWER EXTREMITY (HCC): ICD-10-CM

## 2022-12-06 DIAGNOSIS — D50.9 IRON DEFICIENCY ANEMIA, UNSPECIFIED IRON DEFICIENCY ANEMIA TYPE: ICD-10-CM

## 2022-12-06 DIAGNOSIS — I10 ESSENTIAL HYPERTENSION: ICD-10-CM

## 2022-12-06 DIAGNOSIS — E66.01 SEVERE OBESITY (BMI 35.0-39.9) WITH COMORBIDITY (HCC): ICD-10-CM

## 2022-12-06 DIAGNOSIS — E78.49 OTHER HYPERLIPIDEMIA: ICD-10-CM

## 2022-12-06 DIAGNOSIS — C18.2 MALIGNANT NEOPLASM OF ASCENDING COLON (HCC): ICD-10-CM

## 2022-12-06 DIAGNOSIS — H91.93 BILATERAL HEARING LOSS, UNSPECIFIED HEARING LOSS TYPE: ICD-10-CM

## 2022-12-06 DIAGNOSIS — J30.2 SEASONAL ALLERGIES: ICD-10-CM

## 2022-12-06 DIAGNOSIS — Z86.73 HISTORY OF CVA (CEREBROVASCULAR ACCIDENT): ICD-10-CM

## 2022-12-06 RX ORDER — FERROUS SULFATE 325(65) MG
TABLET ORAL
Qty: 90 TABLET | Refills: 0 | Status: SHIPPED | OUTPATIENT
Start: 2022-12-06

## 2022-12-06 RX ORDER — LISINOPRIL 5 MG/1
TABLET ORAL
Qty: 90 TABLET | Refills: 3 | Status: SHIPPED | OUTPATIENT
Start: 2022-12-06

## 2022-12-06 RX ORDER — CETIRIZINE HYDROCHLORIDE 10 MG/1
10 TABLET ORAL DAILY
Qty: 90 TABLET | Refills: 3 | Status: SHIPPED | OUTPATIENT
Start: 2022-12-06 | End: 2023-03-06

## 2022-12-06 SDOH — ECONOMIC STABILITY: FOOD INSECURITY: WITHIN THE PAST 12 MONTHS, YOU WORRIED THAT YOUR FOOD WOULD RUN OUT BEFORE YOU GOT MONEY TO BUY MORE.: NEVER TRUE

## 2022-12-06 SDOH — ECONOMIC STABILITY: FOOD INSECURITY: WITHIN THE PAST 12 MONTHS, THE FOOD YOU BOUGHT JUST DIDN'T LAST AND YOU DIDN'T HAVE MONEY TO GET MORE.: NEVER TRUE

## 2022-12-06 ASSESSMENT — SOCIAL DETERMINANTS OF HEALTH (SDOH): HOW HARD IS IT FOR YOU TO PAY FOR THE VERY BASICS LIKE FOOD, HOUSING, MEDICAL CARE, AND HEATING?: NOT HARD AT ALL

## 2022-12-06 ASSESSMENT — PATIENT HEALTH QUESTIONNAIRE - PHQ9
1. LITTLE INTEREST OR PLEASURE IN DOING THINGS: 0
2. FEELING DOWN, DEPRESSED OR HOPELESS: 0
SUM OF ALL RESPONSES TO PHQ QUESTIONS 1-9: 0
SUM OF ALL RESPONSES TO PHQ9 QUESTIONS 1 & 2: 0
SUM OF ALL RESPONSES TO PHQ QUESTIONS 1-9: 0

## 2022-12-06 ASSESSMENT — LIFESTYLE VARIABLES
HOW MANY STANDARD DRINKS CONTAINING ALCOHOL DO YOU HAVE ON A TYPICAL DAY: PATIENT DOES NOT DRINK
HOW OFTEN DO YOU HAVE A DRINK CONTAINING ALCOHOL: NEVER

## 2022-12-06 NOTE — PATIENT INSTRUCTIONS
Personalized Preventive Plan for Calvin Damon - 12/6/2022  Medicare offers a range of preventive health benefits. Some of the tests and screenings are paid in full while other may be subject to a deductible, co-insurance, and/or copay. Some of these benefits include a comprehensive review of your medical history including lifestyle, illnesses that may run in your family, and various assessments and screenings as appropriate. After reviewing your medical record and screening and assessments performed today your provider may have ordered immunizations, labs, imaging, and/or referrals for you. A list of these orders (if applicable) as well as your Preventive Care list are included within your After Visit Summary for your review. Other Preventive Recommendations:    A preventive eye exam performed by an eye specialist is recommended every 1-2 years to screen for glaucoma; cataracts, macular degeneration, and other eye disorders. A preventive dental visit is recommended every 6 months. Try to get at least 150 minutes of exercise per week or 10,000 steps per day on a pedometer . Order or download the FREE \"Exercise & Physical Activity: Your Everyday Guide\" from [employer]. Call 2-196.782.7635 or search [employer] online. You need 3486-9496 mg of calcium and 1789-0641 IU of vitamin D per day. It is possible to meet your calcium requirement with diet alone, but a vitamin D supplement is usually necessary to meet this goal.  When exposed to the sun, use a sunscreen that protects against both UVA and UVB radiation with an SPF of 30 or greater. Reapply every 2 to 3 hours or after sweating, drying off with a towel, or swimming. Always wear a seat belt when traveling in a car. Always wear a helmet when riding a bicycle or motorcycle.   Keeping Home a [facility]       As we get older, changes in balance, gait, strength, vision, hearing, and cognition make even the most Consumer Home Safety Checklist,\" it is important to check for potential hazards in each room. And remember, proper lighting is an essential factor in home safety. If you cannot see clearly, you are more likely to fall. Important questions to ask yourself include:   Are lamp, electric, extension, and telephone cords placed out of the flow of traffic and maintained in good condition? Have frayed cords been replaced? Are all small rugs and runners slip resistant? If not, you can secure them to the floor with a special double-sided carpet tape. Are smoke detectors properly locatedone on every floor of your home and one outside of every sleeping area? Are they in good working order? Are batteries replaced at least once a year? Do you have a well-maintained carbon monoxide detector outside every sleeping are in your home? Does your furniture layout leave plenty of space to maneuver between and around chairs, tables, beds, and sofas? Are hallways, stairs and passages between rooms well lit? Can you reach a lamp without getting out of bed? Are floor surfaces well maintained? Shag rugs, high-pile carpeting, tile floors, and polished wood floors can be particularly slippery. Stairs should always have handrails and be carpeted or fitted with a non-skid tread. Is your telephone easily reachable. Is the cord safely tucked away? Room by Room   According to the Association of Aging, bathrooms and phyllis are the two most potentially hazardous rooms in your home. In the Kitchen    Be sure your stove is in proper working order and always make sure burners and the oven are off before you go out or go to sleep. Keep pots on the back burners, turn handles away from the front of the stove, and keep stove clean and free of grease build-up. Kitchen ventilation systems and range exhausts should be working properly.     Keep flammable objects such as towels and pot holders away from the cooking area except when in use. Make sure kitchen curtains are tied back. Move cords and appliances away from the sink and hot surfaces. If extension cords are needed, install wiring guides so they do not hang over the sink, range, or working areas. Look for coffee pots, kettles and toaster ovens with automatic shut-offs. Keep a mop handy in the kitchen so you can wipe up spills instantly. You should also have a small fire extinguisher. Arrange your kitchen with frequently used items on lower shelves to avoid the need to stand on a stepstool to reach them. Make sure countertops are well-lit to avoid injuries while cutting and preparing food. In the Bathroom    Use a non-slip mat or decals in the tub and shower, since wet, soapy tile or porcelain surfaces are extremely slippery. Make sure bathroom rugs are non-skid or tape them firmly to the floor. Bathtubs should have at least one, preferably two, grab bars, firmly attached to structural supports in the wall. (Do not use built-in soap holders or glass shower doors as grab bars.)    Tub seats fitted with non-slip material on the legs allow you to wash sitting down. For people with limited mobility, bathtub transfer benches allow you to slide safely into the tub. Raised toilet seats and toilet safety rails are helpful for those with knee or hip problems. In the [facility]    Make sure you use a nightlight and that the area around your bed is clear of potential obstacles. Be careful with electric blankets and never go to sleep with a heating pad, which can cause serious burns even if on a low setting. Use fire-resistant mattress covers and pillows, and NEVER smoke in bed. Keep a phone next to the bed that is programmed to dial 911 at the push of a button. If you have a chronic condition, you may want to sign on with an automatic call-in service.  Typically the system includes a small pendant that connects directly to an emergency medical voice-response system. You should also make arrangements to stay in contact with someonefriend, neighbor, family memberon a regular schedule. Fire Prevention   According to the [employer]. (Smoke Alarms for Every) [address] [facility], senior citizens are one of the two highest risk groups for death and serious injuries due to residential fires. When cooking, wear short-sleeved items, never a bulky long-sleeved robe. The [facility]'s Safety Checklist for Older Consumers emphasizes the importance of checking basements, garages, workshops and storage areas for fire hazards, such as volatile liquids, piles of old rags or clothing and overloaded circuits. Never smoke in bed or when lying down on a couch or recliner chair. Small portable electric or kerosene heaters are responsible for many home fires and should be used cautiously if at all. If you do use one, be sure to keep them away from flammable materials. In case of fire, make sure you have a pre-established emergency exit plan. Have a professional check your fireplace and other fuel-burning appliances yearly. [employer]   Baby boomers entering the sanchez years will continue to see the development of new products to help older adults live safely and independently in spite of age-related changes. Making Life More Livable  , by Armando Maldonado, lists over 1,000 products for \"living well in the mature years,\" such as bathing and mobility aids, household security devices, ergonomically designed knives and peelers, and faucet valves and knobs for temperature control. Medical supply stores and organizations are good sources of information about products that improve your quality of life and insure your safety.      Last Reviewed: November 2009 Meera Vazquez MD   Updated: 3/7/2011

## 2022-12-06 NOTE — PROGRESS NOTES
Medicare Annual Wellness Visit    Ronni January is here for Medicare AWV    Assessment & Plan   Initial Medicare annual wellness visit  Bilateral hearing loss, unspecified hearing loss type  -     [facility]  Severe obesity (BMI 35.0-39. 9) with comorbidity ([address]  Malignant neoplasm of ascending colon (HCC)  Acute deep vein thrombosis (DVT) of calf muscle vein of left lower extremity (HCC)  -     apixaban (ELIQUIS) 5 MG TABS tablet; Take 1 tablet by mouth 2 times daily, Disp-180 tablet, R-3Normal  History of CVA (cerebrovascular accident)  -     lisinopril (PRINIVIL;ZESTRIL) 5 MG tablet; TAKE 1 TABLET BY MOUTH EVERY DAY, Disp-90 tablet, R-3Normal  -     Lipid Panel; Future  Iron deficiency anemia, unspecified iron deficiency anemia type  -     ferrous sulfate (IRON 325) 325 (65 Fe) MG tablet; TAKE 1 TABLET BY MOUTH EVERY DAY WITH BREAKFAST, Disp-90 tablet, R-0Normal  -     CBC with Auto Differential; Future  -     Comprehensive Metabolic Panel; Future  -     Iron and TIBC; Future  Seasonal allergies  -     cetirizine (ZYRTEC) 10 MG tablet; Take 1 tablet by mouth daily, Disp-90 tablet, R-3Normal  Other hyperlipidemia  Essential hypertension  -     CBC with Auto Differential; Future  -     Comprehensive Metabolic Panel; Future      Recommendations for Preventive Services Due: see orders and patient instructions/AVS.  Recommended screening schedule for the next 5-10 years is provided to the patient in written form: see Patient Instructions/AVS.     Return in 6 months (on 6/6/2023) for Medicare Annual Wellness Visit in 1 year.      Subjective   The following acute and/or chronic problems were also addressed today:  Obesity   DVT - stable, compliant with eliquis   Hx of CVA - stable, compliant with medication   Iron deficiency anemia   Hyperlipemia - stable, continue lipitor   HTN - stable, conitnue lisinopril     Patient's complete Health Risk Assessment and screening values have been reviewed and are found in [facility]. The following problems were reviewed today and where indicated follow up appointments were made and/or referrals ordered. Positive Risk Factor Screenings with Interventions:  Fall Risk:  Do you feel unsteady or are you worried about falling? : (!) yes  2 or more falls in past year?: no  Fall with injury in past year?: no     Fall Risk Interventions:    Home safety tips provided            General Health and ACP:  General  In general, how would you say your health is?: Good  In the past 7 days, have you experienced any of the following: New or Increased Pain, New or Increased Fatigue, Loneliness, Social Isolation, Stress or Anger?: No  Do you get the social and emotional support that you need?: Yes  Do you have a Living Will?: (!) No    Advance Directives       Power of  Living Will ACP-Advance Directive ACP-Power of     Not on File Not on File Not on File Not on File          General Health Risk Interventions:  No Living Will: Advance Care Planning addressed with patient today    Weight and Activity:  Physical Activity: Insufficiently Active    Days of Exercise per Week: 4 days    Minutes of Exercise per Session: 20 min     On average, how many days per week do you engage in moderate to strenuous exercise (like a brisk walk)?: 4 days  Have you lost any weight without trying in the past 3 months?: (!) Yes  Body mass index: (!) 35.11    Health Habits/Nutrition Interventions:  Obesity discussed    Vision Screen:  Do you have difficulty driving, watching TV, or doing any of your daily activities because of your eyesight?: (!) Yes  Have you had an eye exam within the past year?: (!) No  No results found.     Hearing/Vision Interventions:  Hearing concerns:  audiology referral provided  Vision concerns:  patient encouraged to make appointment with his/her eye specialist    Safety:  Do you have either shower bars, grab bars, non-slip mats or non-slip surfaces in your shower or bathtub?: (!) No  Do you always fasten your seatbelt when you are in a car?: (!) No    Safety Interventions:  Home safety tips provided    ADL's:   Patient reports needing help with:  Select all that apply: (!) Bathing  Select all that apply: [employer], Housekeeping, Banking/Finances, Shopping, Food Preparation, Transportation    ADL Interventions:  Patient declines any further evaluation/treatment for this issue          Objective   Vitals:    12/06/22 1344   BP: 138/82   Pulse: 86   Resp: 16   Temp: 97.6 °F (36.4 °C)   SpO2: 98%   Weight: 192 lb (87.1 kg)   Height: 5' 2\" (1.575 m)      Body mass index is 35.12 kg/m². Physical Exam:  General: Awake, alert, and oriented to person, place, time, and purpose, appears stated age and cooperative, No acute distress  Head: Normocephalic, atraumatic  Eyes: conjunctivae/corneas clear, EOM's intact. Mouth: Mucous membranes moist with no pharyngeal exudate or erythema  Neck: no JVD, no adenopathy, no carotid bruit, supple, symmetrical, trachea midline  Back: symmetric, ROM normal, No CVA tenderness. Lungs: clear to auscultation bilaterally without wheezes, rales, or rhonchi  Heart: regular rate and rhythm, S1, S2 normal, no murmur, click, rub or gallop  Abdomen: soft, non-tender; bowel sounds normal; no masses,  no organomegaly  Extremities: atraumatic, no cyanosis, no edema  Skin: color, texture, turgor within normal limits. No rashes or lesions or normal  Neurologic: speech appropriate, moves all 4 extremities, normal muscle strength and tone, CN 2-12 grossly intact         No Known Allergies  Prior to Visit Medications    Medication Sig Taking?  Authorizing Provider   apixaban (ELIQUIS) 5 MG TABS tablet Take 1 tablet by mouth 2 times daily Yes Marcio Das, DO   lisinopril (PRINIVIL;ZESTRIL) 5 MG tablet TAKE 1 TABLET BY MOUTH EVERY DAY Yes Jeanette Robles, DO   cetirizine (ZYRTEC) 10 MG tablet Take 1 tablet by mouth daily Yes Fly Delgado DO [place]   ferrous sulfate (IRON 325) 325 (65 Fe) MG tablet TAKE 1 TABLET BY MOUTH EVERY DAY WITH BREAKFAST Yes Jeanette Duarte DO   atorvastatin (LIPITOR) 40 MG tablet TAKE 1 TABLET BY MOUTH EVERY DAY AT NIGHT Yes Jeanette Robles DO   diphenoxylate-atropine (LOMOTIL) 2.5-0.025 MG per tablet TAKE 2 TABLETS BY MOUTH EVERY 6 HOURS AS NEEDED FOR DIARRHEA. MAX 8 TABLETS A DAY Yes Historical Provider, MD   ondansetron (ZOFRAN) 8 MG tablet  Yes Historical Provider, MD   nystatin (MYCOSTATIN) 319505 UNIT/GM cream Apply topically 2 times daily.  Yes Grazyna Phillips DO   Incontinence Supply Disposable MISC Large size Yes Grazyna Phillips DO       CareTeam (Including outside providers/suppliers regularly involved in providing care):   Patient Care Team:  Grazyna Phillips DO as PCP - General (Internal Medicine)  Grazyna Phillips DO as PCP - [facility] Empaneled Provider     Reviewed and updated this visit:  Tobacco  Allergies  Meds  Med Hx  Surg Hx  Soc Hx  Fam Hx          Grazyna Phillips DO

## 2022-12-15 ENCOUNTER — COMMUNITY OUTREACH (OUTPATIENT)
Dept: FAMILY MEDICINE CLINIC | Age: 67
End: 2022-12-15

## 2022-12-15 NOTE — PROGRESS NOTES
Patient's HM shows they are overdue for Mammogram.  Care Everywhere and  files searched. No results to attach to order nor HM updated. Patient is current for Colorectal Cancer Screen.

## 2023-01-05 ENCOUNTER — CLINICAL DOCUMENTATION (OUTPATIENT)
Dept: SURGERY | Age: 68
End: 2023-01-05

## 2023-01-05 NOTE — PROGRESS NOTES
MA received referral from Dr Nanette Rios, put in [facility]  Electronically signed by Ericka Chaudhari [address] [place] [address] Kate Momin on 1/5/2023 at 8:38 AM

## 2023-01-20 ENCOUNTER — FOLLOWUP TELEPHONE ENCOUNTER (OUTPATIENT)
Dept: AUDIOLOGY | Age: 68
End: 2023-01-20

## 2023-01-20 NOTE — PROGRESS NOTES
Missed audiology appt , called to reschedule. No answer. LVM to call back to reschedule.  Electronically signed by Rod Roca on 1/20/2023 at 2:34 PM

## 2023-05-31 DIAGNOSIS — E78.49 OTHER HYPERLIPIDEMIA: ICD-10-CM

## 2023-05-31 RX ORDER — ATORVASTATIN CALCIUM 40 MG/1
TABLET, FILM COATED ORAL
Qty: 90 TABLET | Refills: 1 | Status: SHIPPED | OUTPATIENT
Start: 2023-05-31

## 2023-05-31 NOTE — TELEPHONE ENCOUNTER
Last Appointment:  12/6/2022  Future Appointments   Date Time Provider Shadi Rowell   6/20/2023  4:00 PM MD KENNEDY Page [facility]

## 2023-06-01 DIAGNOSIS — D50.9 IRON DEFICIENCY ANEMIA, UNSPECIFIED IRON DEFICIENCY ANEMIA TYPE: ICD-10-CM

## 2023-06-01 RX ORDER — FERROUS SULFATE 325(65) MG
TABLET ORAL
Qty: 90 TABLET | Refills: 0 | Status: SHIPPED | OUTPATIENT
Start: 2023-06-01

## 2023-06-01 NOTE — TELEPHONE ENCOUNTER
Last Appointment:  12/6/2022  Future Appointments   Date Time Provider Shadi Rowell   6/20/2023  4:00 PM Carlos Wing MD [facility]

## 2023-06-20 ENCOUNTER — OFFICE VISIT (OUTPATIENT)
Dept: SURGERY | Age: 68
End: 2023-06-20
Payer: MEDICAID

## 2023-06-20 VITALS
WEIGHT: 194 LBS | DIASTOLIC BLOOD PRESSURE: 99 MMHG | OXYGEN SATURATION: 97 % | BODY MASS INDEX: 35.48 KG/M2 | HEART RATE: 73 BPM | SYSTOLIC BLOOD PRESSURE: 122 MMHG

## 2023-06-20 DIAGNOSIS — Z85.038 HISTORY OF COLON CANCER: Primary | ICD-10-CM

## 2023-06-20 PROCEDURE — 1123F ACP DISCUSS/DSCN MKR DOCD: CPT | Performed by: SURGERY

## 2023-06-20 PROCEDURE — 99203 OFFICE O/P NEW LOW 30 MIN: CPT | Performed by: SURGERY

## 2023-06-20 NOTE — PROGRESS NOTES
[facility] Note    Assessment/Plan:     Diagnosis Orders   1. History of colon cancer      Doing well post resection. She is due for surveillance colonoscopy. We will schedule. Return for Colonoscopy. Chief Complaint   Patient presents with    Follow-up     Ref from dr Elayne Rizzo for colon surveillance       PCP: Joy Cheatham DO    HPI: Stalin Santana is a 80 y.o. female here for follow-up of history of colon cancer status postresection. She is with her son who helps with the interview. Overall they report her is doing well. Her bowels are moving well. She has no abdominal pain. She has no blood in the stool. Has been no weight loss. She has not had follow-up colonoscopy since her resection. Review of Systems   All other systems reviewed and are negative. Past Medical History:   Diagnosis Date    Anemia     Cerebral artery occlusion with cerebral infarction ([facility] [address] 05/2021    saskia hanson    Chronic back pain     Hyperlipidemia 03/2010    Hypertension     Obesity     Osteoarthritis     Tobacco abuse        Past Surgical History:   Procedure Laterality Date    APPENDECTOMY      COLECTOMY Right 8/23/2021    OPEN RIGHT HEMICOLECTOMY performed by Jaylin Avila MD at [address] [facility] N/A 9/21/2021    MEDIPORT INSERTION performed by Marcelo Singh MD at [address] [place] [address] N/A 8/22/2021    COLONOSCOPY performed by Yuly Alexandra MD at [facility] OR       Family History   Problem Relation Age of Onset    Diabetes Sister     High Blood Pressure Sister     Cancer Sister         Thryoid    Diabetes Sister     Heart Disease Sister 28        Heart Failure    Cancer Brother     Diabetes Brother     Heart Disease Brother     High Blood Pressure Brother     High Cholesterol Brother        Social History     Socioeconomic History    Marital status:       Spouse name: Not on file

## 2023-07-11 DIAGNOSIS — D50.9 IRON DEFICIENCY ANEMIA, UNSPECIFIED IRON DEFICIENCY ANEMIA TYPE: ICD-10-CM

## 2023-07-11 RX ORDER — FERROUS SULFATE 325(65) MG
TABLET ORAL
Qty: 90 TABLET | Refills: 0 | OUTPATIENT
Start: 2023-07-11

## 2023-08-07 ENCOUNTER — PREP FOR PROCEDURE (OUTPATIENT)
Dept: SURGERY | Age: 68
End: 2023-08-07

## 2023-08-07 ENCOUNTER — TELEPHONE (OUTPATIENT)
Dept: SURGERY | Age: 68
End: 2023-08-07

## 2023-08-07 PROBLEM — Z85.038 HISTORY OF COLON CANCER: Status: ACTIVE | Noted: 2023-08-07

## 2023-08-07 NOTE — TELEPHONE ENCOUNTER
Patricio Jessie is scheduled for colonoscopy with Dr Mae Saavedra on 12-07-23 at [facility]. Patient needs to be NPO after midnight the night before procedure. All surgery instructions were explained to the patient and a surgery letter was also mailed out. MA informed patient that PAT will also be calling to review pre-op instructions and medications. Patient verbalized understanding.   Electronically signed by Aren Perla MA on 8/7/2023 at 1:44 PM

## 2023-08-07 NOTE — TELEPHONE ENCOUNTER
Prior Authorization Form:      DEMOGRAPHICS:                     Patient Name:  Adia Young  Patient :  1955            Insurance:  [place] / Plan: Syeda Weaver DEPT OF JOB / Product Type: *No Product type* /   Insurance ID Number:    Payer/Plan Subscr  Sex Relation Sub. Ins. ID Effective Group Num   1.  [place] Adia Young 1955 Female Self [id number]                                    [address]         DIAGNOSIS & PROCEDURE:                       Procedure/Operation: COlonoscopy           CPT Code: 11743    Diagnosis:  History of colon cancer    ICD10 Code: Q03.641    [facility]  [facility]    Surgeon:  Dr Alex Carlson INFORMATION:                          Date: 23    Time: 11:15 am              Anesthesia:  [facility]                                                       Status:  Outpatient        Special Comments:         Electronically signed by Chema Gomez MA on 2023 at 1:45 PM

## 2023-09-02 DIAGNOSIS — D50.9 IRON DEFICIENCY ANEMIA, UNSPECIFIED IRON DEFICIENCY ANEMIA TYPE: ICD-10-CM

## 2023-09-05 RX ORDER — FERROUS SULFATE 325(65) MG
TABLET ORAL
Qty: 90 TABLET | Refills: 0 | Status: SHIPPED | OUTPATIENT
Start: 2023-09-05

## 2023-09-05 NOTE — TELEPHONE ENCOUNTER
Last Appointment:  12/6/2022  Future Appointments   Date Time Provider [address]   12/19/2023  3:45 PM Rody Bryan MD [facility]

## 2023-11-20 ENCOUNTER — TELEPHONE (OUTPATIENT)
Dept: FAMILY MEDICINE CLINIC | Age: 68
End: 2023-11-20

## 2023-11-20 DIAGNOSIS — Z86.73 HISTORY OF CVA (CEREBROVASCULAR ACCIDENT): Primary | ICD-10-CM

## 2023-11-20 NOTE — TELEPHONE ENCOUNTER
Her walker broke, she would like a new script sent in to [address] for a new one thank you.

## 2023-11-26 DIAGNOSIS — E78.49 OTHER HYPERLIPIDEMIA: ICD-10-CM

## 2023-11-28 RX ORDER — ATORVASTATIN CALCIUM 40 MG/1
TABLET, FILM COATED ORAL
Qty: 90 TABLET | Refills: 1 | Status: SHIPPED | OUTPATIENT
Start: 2023-11-28

## 2023-11-28 NOTE — TELEPHONE ENCOUNTER
Last Appointment:  12/6/2022  Future Appointments   Date Time Provider [address]  [facility]   12/14/2023  2:30 PM Margareth Huerta [facility]   12/19/2023  3:45 PM MD KENNEDY Griffin [facility]

## 2023-12-01 DIAGNOSIS — D50.9 IRON DEFICIENCY ANEMIA, UNSPECIFIED IRON DEFICIENCY ANEMIA TYPE: ICD-10-CM

## 2023-12-01 RX ORDER — FERROUS SULFATE 325(65) MG
TABLET ORAL
Qty: 90 TABLET | Refills: 0 | OUTPATIENT
Start: 2023-12-01

## 2023-12-01 NOTE — TELEPHONE ENCOUNTER
Last Appointment:  12/6/2022  Future Appointments   Date Time Provider Department Center   12/14/2023  2:30 PM Jeanette Robles DO [facility]   12/19/2023  3:45 PM Davion Collier MD [facility]

## 2023-12-05 ENCOUNTER — TELEPHONE (OUTPATIENT)
Dept: SURGERY | Age: 68
End: 2023-12-05

## 2023-12-05 NOTE — TELEPHONE ENCOUNTER
Spoke to Lyly at [facility]. Dr Vik Fuentes ok with patient taking Eliquis this morning. Left msg on patient's vm to not take her Eliquis tomorrow, still ok for Thursday procedure.

## 2023-12-14 ENCOUNTER — OFFICE VISIT (OUTPATIENT)
Dept: FAMILY MEDICINE CLINIC | Age: 68
End: 2023-12-14
Payer: MEDICARE

## 2023-12-14 VITALS
WEIGHT: 169 LBS | TEMPERATURE: 97.9 F | HEART RATE: 111 BPM | OXYGEN SATURATION: 98 % | HEIGHT: 62 IN | BODY MASS INDEX: 31.1 KG/M2 | SYSTOLIC BLOOD PRESSURE: 160 MMHG | DIASTOLIC BLOOD PRESSURE: 76 MMHG

## 2023-12-14 DIAGNOSIS — D50.9 IRON DEFICIENCY ANEMIA, UNSPECIFIED IRON DEFICIENCY ANEMIA TYPE: ICD-10-CM

## 2023-12-14 DIAGNOSIS — Z00.00 MEDICARE ANNUAL WELLNESS VISIT, SUBSEQUENT: Primary | ICD-10-CM

## 2023-12-14 DIAGNOSIS — I10 ESSENTIAL HYPERTENSION: ICD-10-CM

## 2023-12-14 DIAGNOSIS — E78.49 OTHER HYPERLIPIDEMIA: ICD-10-CM

## 2023-12-14 DIAGNOSIS — R32 URINARY INCONTINENCE, UNSPECIFIED TYPE: ICD-10-CM

## 2023-12-14 DIAGNOSIS — Z87.891 PERSONAL HISTORY OF TOBACCO USE: ICD-10-CM

## 2023-12-14 DIAGNOSIS — I82.462 DEEP VEIN THROMBOSIS (DVT) OF CALF MUSCLE VEIN OF LEFT LOWER EXTREMITY, UNSPECIFIED CHRONICITY (HCC): ICD-10-CM

## 2023-12-14 DIAGNOSIS — Z86.73 HISTORY OF CVA (CEREBROVASCULAR ACCIDENT): ICD-10-CM

## 2023-12-14 DIAGNOSIS — Z12.31 BREAST CANCER SCREENING BY MAMMOGRAM: ICD-10-CM

## 2023-12-14 DIAGNOSIS — H91.93 BILATERAL HEARING LOSS, UNSPECIFIED HEARING LOSS TYPE: ICD-10-CM

## 2023-12-14 DIAGNOSIS — B37.2 YEAST INFECTION OF THE SKIN: ICD-10-CM

## 2023-12-14 PROBLEM — C18.2 MALIGNANT NEOPLASM OF ASCENDING COLON (HCC): Status: RESOLVED | Noted: 2021-08-22 | Resolved: 2023-12-14

## 2023-12-14 PROBLEM — I82.562 CHRONIC DEEP VEIN THROMBOSIS (DVT) OF CALF MUSCLE VEIN OF LEFT LOWER EXTREMITY (HCC): Status: ACTIVE | Noted: 2021-05-03

## 2023-12-14 PROCEDURE — 1123F ACP DISCUSS/DSCN MKR DOCD: CPT | Performed by: INTERNAL MEDICINE

## 2023-12-14 PROCEDURE — G0296 VISIT TO DETERM LDCT ELIG: HCPCS | Performed by: INTERNAL MEDICINE

## 2023-12-14 PROCEDURE — 3078F DIAST BP <80 MM HG: CPT | Performed by: INTERNAL MEDICINE

## 2023-12-14 PROCEDURE — 3077F SYST BP >= 140 MM HG: CPT | Performed by: INTERNAL MEDICINE

## 2023-12-14 PROCEDURE — 99214 OFFICE O/P EST MOD 30 MIN: CPT | Performed by: INTERNAL MEDICINE

## 2023-12-14 PROCEDURE — G0439 PPPS, SUBSEQ VISIT: HCPCS | Performed by: INTERNAL MEDICINE

## 2023-12-14 RX ORDER — FERROUS SULFATE 325(65) MG
1 TABLET ORAL
Qty: 90 TABLET | Refills: 1 | Status: SHIPPED | OUTPATIENT
Start: 2023-12-14

## 2023-12-14 RX ORDER — OXYBUTYNIN CHLORIDE 5 MG/1
5 TABLET, EXTENDED RELEASE ORAL DAILY
Qty: 30 TABLET | Refills: 3 | Status: SHIPPED | OUTPATIENT
Start: 2023-12-14

## 2023-12-14 RX ORDER — LISINOPRIL 10 MG/1
10 TABLET ORAL DAILY
Qty: 90 TABLET | Refills: 1 | Status: SHIPPED | OUTPATIENT
Start: 2023-12-14

## 2023-12-14 RX ORDER — NYSTATIN 100000 U/G
CREAM TOPICAL
Qty: 30 G | Refills: 5 | Status: SHIPPED | OUTPATIENT
Start: 2023-12-14

## 2023-12-14 RX ORDER — ATORVASTATIN CALCIUM 40 MG/1
40 TABLET, FILM COATED ORAL NIGHTLY
Qty: 90 TABLET | Refills: 1 | Status: SHIPPED | OUTPATIENT
Start: 2023-12-14

## 2023-12-14 SDOH — ECONOMIC STABILITY: INCOME INSECURITY: HOW HARD IS IT FOR YOU TO PAY FOR THE VERY BASICS LIKE FOOD, HOUSING, MEDICAL CARE, AND HEATING?: NOT HARD AT ALL

## 2023-12-14 SDOH — ECONOMIC STABILITY: FOOD INSECURITY: WITHIN THE PAST 12 MONTHS, YOU WORRIED THAT YOUR FOOD WOULD RUN OUT BEFORE YOU GOT MONEY TO BUY MORE.: NEVER TRUE

## 2023-12-14 SDOH — ECONOMIC STABILITY: FOOD INSECURITY: WITHIN THE PAST 12 MONTHS, THE FOOD YOU BOUGHT JUST DIDN'T LAST AND YOU DIDN'T HAVE MONEY TO GET MORE.: NEVER TRUE

## 2023-12-14 SDOH — ECONOMIC STABILITY: HOUSING INSECURITY
IN THE LAST 12 MONTHS, WAS THERE A TIME WHEN YOU DID NOT HAVE A STEADY PLACE TO SLEEP OR SLEPT IN A SHELTER (INCLUDING NOW)?: NO

## 2023-12-14 ASSESSMENT — PATIENT HEALTH QUESTIONNAIRE - PHQ9
SUM OF ALL RESPONSES TO PHQ QUESTIONS 1-9: 0
SUM OF ALL RESPONSES TO PHQ9 QUESTIONS 1 & 2: 0
SUM OF ALL RESPONSES TO PHQ QUESTIONS 1-9: 0
SUM OF ALL RESPONSES TO PHQ QUESTIONS 1-9: 0
1. LITTLE INTEREST OR PLEASURE IN DOING THINGS: 0
SUM OF ALL RESPONSES TO PHQ QUESTIONS 1-9: 0
2. FEELING DOWN, DEPRESSED OR HOPELESS: 0

## 2023-12-14 ASSESSMENT — LIFESTYLE VARIABLES: HOW OFTEN DO YOU HAVE A DRINK CONTAINING ALCOHOL: NEVER

## 2023-12-14 NOTE — PROGRESS NOTES
Medicare Annual Wellness Visit    Adela Wiggins is here for Medicare AWV (Wants a medication that will stop her from going to the bathroom too much)    Assessment & Plan   Medicare annual wellness visit, subsequent  Essential hypertension  -     lisinopril (PRINIVIL;ZESTRIL) 10 MG tablet; Take 1 tablet by mouth daily TAKE 1 TABLET BY MOUTH EVERY DAY, Disp-90 tablet, R-1Normal  -     CBC with Auto Differential; Future  -     Comprehensive Metabolic Panel; Future  -Uncontrolled, lisinopril increased to 10 mg from 5 mg. Deep vein thrombosis (DVT) of calf muscle vein of left lower extremity, unspecified chronicity (HCC)  -     apixaban (ELIQUIS) 5 MG TABS tablet; Take 1 tablet by mouth 2 times daily, Disp-180 tablet, R-1Normal  -Stable, no recurrence of DVT, chronically on Eliquis, follow-up in 6 months  Other hyperlipidemia  -     atorvastatin (LIPITOR) 40 MG tablet; Take 1 tablet by mouth nightly, Disp-90 tablet, R-1Normal  -     Lipid Panel; Future  -Will check a lipid panel today, continue Lipitor at current dose. Iron deficiency anemia, unspecified iron deficiency anemia type  -     ferrous sulfate (IRON 325) 325 (65 Fe) MG tablet; Take 1 tablet by mouth daily (with breakfast), Disp-90 tablet, R-1Normal  -     CBC with Auto Differential; Future  -     Iron and TIBC; Future  -Uncontrolled, patient reports noncompliance with her iron supplement. Will check iron levels and blood counts today  History of CVA (cerebrovascular accident)  -     atorvastatin (LIPITOR) 40 MG tablet; Take 1 tablet by mouth nightly, Disp-90 tablet, R-1Normal  Urinary incontinence, unspecified type  -     Incontinence Supply Disposable MISC; Disp-90 each, R-5, NormalLarge size   -     oxybutynin (DITROPAN XL) 5 MG extended release tablet; Take 1 tablet by mouth daily, Disp-30 tablet, R-3Normal  -Uncontrolled, patient wishes to try medication for overactive bladder in hopes that I will help her incontinence issues.   Yeast infection of the

## 2023-12-15 LAB
ABSOLUTE IMMATURE GRANULOCYTE: <0.03 K/UL (ref 0–0.58)
ALBUMIN SERPL-MCNC: 4.4 G/DL (ref 3.5–5.2)
ALP BLD-CCNC: 107 U/L (ref 35–104)
ALT SERPL-CCNC: 12 U/L (ref 0–32)
ANION GAP SERPL CALCULATED.3IONS-SCNC: 15 MMOL/L (ref 7–16)
AST SERPL-CCNC: 16 U/L (ref 0–31)
BASOPHILS ABSOLUTE: 0.03 K/UL (ref 0–0.2)
BASOPHILS RELATIVE PERCENT: 0 % (ref 0–2)
BILIRUB SERPL-MCNC: 1.2 MG/DL (ref 0–1.2)
BUN BLDV-MCNC: 13 MG/DL (ref 6–23)
CALCIUM SERPL-MCNC: 9.7 MG/DL (ref 8.6–10.2)
CHLORIDE BLD-SCNC: 100 MMOL/L (ref 98–107)
CHOLESTEROL: 121 MG/DL
CO2: 24 MMOL/L (ref 22–29)
CREAT SERPL-MCNC: 0.9 MG/DL (ref 0.5–1)
EOSINOPHILS ABSOLUTE: 0.15 K/UL (ref 0.05–0.5)
EOSINOPHILS RELATIVE PERCENT: 2 % (ref 0–6)
GFR SERPL CREATININE-BSD FRML MDRD: >60 ML/MIN/1.73M2
GLUCOSE BLD-MCNC: 112 MG/DL (ref 74–99)
HCT VFR BLD CALC: 38.7 % (ref 34–48)
HDLC SERPL-MCNC: 43 MG/DL
HEMOGLOBIN: 12.8 G/DL (ref 11.5–15.5)
IMMATURE GRANULOCYTES: 0 % (ref 0–5)
IRON % SATURATION: 29 % (ref 15–50)
IRON: 68 UG/DL (ref 37–145)
LDL CHOLESTEROL: 57 MG/DL
LYMPHOCYTES ABSOLUTE: 0.85 K/UL (ref 1.5–4)
LYMPHOCYTES RELATIVE PERCENT: 12 % (ref 20–42)
MCH RBC QN AUTO: 31.9 PG (ref 26–35)
MCHC RBC AUTO-ENTMCNC: 33.1 G/DL (ref 32–34.5)
MCV RBC AUTO: 96.5 FL (ref 80–99.9)
MONOCYTES ABSOLUTE: 0.49 K/UL (ref 0.1–0.95)
MONOCYTES RELATIVE PERCENT: 7 % (ref 2–12)
NEUTROPHILS ABSOLUTE: 5.63 K/UL (ref 1.8–7.3)
NEUTROPHILS RELATIVE PERCENT: 79 % (ref 43–80)
PDW BLD-RTO: 15.4 % (ref 11.5–15)
PLATELET, FLUORESCENCE: 115 K/UL (ref 130–450)
PMV BLD AUTO: 9.2 FL (ref 7–12)
POTASSIUM SERPL-SCNC: 4.1 MMOL/L (ref 3.5–5)
RBC # BLD: 4.01 M/UL (ref 3.5–5.5)
SODIUM BLD-SCNC: 139 MMOL/L (ref 132–146)
TOTAL IRON BINDING CAPACITY: 234 UG/DL (ref 250–450)
TOTAL PROTEIN: 7.7 G/DL (ref 6.4–8.3)
TRIGL SERPL-MCNC: 106 MG/DL
VLDLC SERPL CALC-MCNC: 21 MG/DL
WBC # BLD: 7.2 K/UL (ref 4.5–11.5)

## 2024-04-11 DIAGNOSIS — R32 URINARY INCONTINENCE, UNSPECIFIED TYPE: ICD-10-CM

## 2024-04-11 RX ORDER — OXYBUTYNIN CHLORIDE 5 MG/1
5 TABLET, EXTENDED RELEASE ORAL DAILY
Qty: 90 TABLET | Refills: 2 | Status: SHIPPED | OUTPATIENT
Start: 2024-04-11

## 2024-04-11 NOTE — TELEPHONE ENCOUNTER
Last Appointment:  12/14/2023  Future Appointments   Date Time Provider Department Center   6/17/2024  2:30 PM Jeanette Robles DO [facility]   12/16/2024  2:30 PM Jeanette Robles DO [facility]

## 2024-06-11 DIAGNOSIS — I10 ESSENTIAL HYPERTENSION: ICD-10-CM

## 2024-06-11 DIAGNOSIS — D50.9 IRON DEFICIENCY ANEMIA, UNSPECIFIED IRON DEFICIENCY ANEMIA TYPE: ICD-10-CM

## 2024-06-11 RX ORDER — LISINOPRIL 10 MG/1
10 TABLET ORAL DAILY
Qty: 90 TABLET | Refills: 1 | Status: SHIPPED | OUTPATIENT
Start: 2024-06-11

## 2024-06-11 RX ORDER — FERROUS SULFATE 325(65) MG
1 TABLET ORAL
Qty: 90 TABLET | Refills: 1 | Status: SHIPPED | OUTPATIENT
Start: 2024-06-11

## 2024-06-11 NOTE — TELEPHONE ENCOUNTER
Last Appointment:  12/14/2023  Future Appointments   Date Time Provider Department Center   12/16/2024  2:30 PM Wilbert Young, DO [facility]

## 2024-08-19 DIAGNOSIS — I82.462 DEEP VEIN THROMBOSIS (DVT) OF CALF MUSCLE VEIN OF LEFT LOWER EXTREMITY, UNSPECIFIED CHRONICITY (HCC): ICD-10-CM

## 2024-08-19 DIAGNOSIS — E78.49 OTHER HYPERLIPIDEMIA: ICD-10-CM

## 2024-08-19 RX ORDER — ATORVASTATIN CALCIUM 40 MG/1
40 TABLET, FILM COATED ORAL NIGHTLY
Qty: 90 TABLET | Refills: 1 | Status: SHIPPED | OUTPATIENT
Start: 2024-08-19

## 2024-08-19 NOTE — TELEPHONE ENCOUNTER
Last Appointment:  12/14/2023  Future Appointments   Date Time Provider Department Center   8/29/2024 11:30 AM Wilbert Young DO [facility]

## 2024-09-13 DIAGNOSIS — I10 ESSENTIAL HYPERTENSION: ICD-10-CM

## 2024-09-13 DIAGNOSIS — D50.9 IRON DEFICIENCY ANEMIA, UNSPECIFIED IRON DEFICIENCY ANEMIA TYPE: ICD-10-CM

## 2024-09-16 RX ORDER — LISINOPRIL 10 MG/1
10 TABLET ORAL DAILY
Qty: 90 TABLET | Refills: 1 | Status: SHIPPED | OUTPATIENT
Start: 2024-09-16

## 2024-09-16 RX ORDER — FERROUS SULFATE 325(65) MG
1 TABLET ORAL
Qty: 90 TABLET | Refills: 1 | Status: SHIPPED | OUTPATIENT
Start: 2024-09-16

## 2025-01-29 DIAGNOSIS — I82.462 DEEP VEIN THROMBOSIS (DVT) OF CALF MUSCLE VEIN OF LEFT LOWER EXTREMITY, UNSPECIFIED CHRONICITY (HCC): ICD-10-CM

## 2025-01-29 DIAGNOSIS — E78.49 OTHER HYPERLIPIDEMIA: ICD-10-CM

## 2025-01-29 DIAGNOSIS — D50.9 IRON DEFICIENCY ANEMIA, UNSPECIFIED IRON DEFICIENCY ANEMIA TYPE: ICD-10-CM

## 2025-01-29 NOTE — TELEPHONE ENCOUNTER
Name of Medication(s) Requested:  Requested Prescriptions     Pending Prescriptions Disp Refills    apixaban (ELIQUIS) 5 MG TABS tablet [Pharmacy Med Name: ELIQUIS 5 MG TABLET] 60 tablet 0     Sig: TAKE 1 TABLET BY MOUTH TWICE A DAY    ferrous sulfate (IRON 325) 325 (65 Fe) MG tablet [Pharmacy Med Name: FERROUS SULFATE 325 MG TABLET] 30 tablet 0     Sig: TAKE 1 TABLET BY MOUTH EVERY DAY WITH BREAKFAST    atorvastatin (LIPITOR) 40 MG tablet [Pharmacy Med Name: ATORVASTATIN 40 MG TABLET] 30 tablet 0     Sig: TAKE 1 TABLET BY MOUTH EVERY DAY AT NIGHT       Medication is on current medication list Yes    Dosage and directions were verified? Yes    Quantity verified: 30 day supply     Pharmacy Verified?  Yes    Last Appointment:  Visit date not found    Future appts:  No future appointments.     (If no appt send self scheduling link. .REFILLAPPT)  Scheduling request sent?     [] Yes  [] No    Does patient need updated?  [] Yes  [] No

## 2025-01-30 RX ORDER — ATORVASTATIN CALCIUM 40 MG/1
40 TABLET, FILM COATED ORAL NIGHTLY
Qty: 30 TABLET | Refills: 0 | Status: SHIPPED | OUTPATIENT
Start: 2025-01-30

## 2025-01-30 RX ORDER — APIXABAN 5 MG/1
5 TABLET, FILM COATED ORAL 2 TIMES DAILY
Qty: 60 TABLET | Refills: 0 | Status: SHIPPED | OUTPATIENT
Start: 2025-01-30

## 2025-01-30 RX ORDER — FERROUS SULFATE 325(65) MG
1 TABLET ORAL
Qty: 30 TABLET | Refills: 0 | Status: SHIPPED | OUTPATIENT
Start: 2025-01-30

## 2025-01-30 NOTE — TELEPHONE ENCOUNTER
Will give one month supply. Has not been seen in over a year and needs to est with either myself or a new doctor.

## 2025-02-26 DIAGNOSIS — D50.9 IRON DEFICIENCY ANEMIA, UNSPECIFIED IRON DEFICIENCY ANEMIA TYPE: ICD-10-CM

## 2025-02-26 DIAGNOSIS — E78.49 OTHER HYPERLIPIDEMIA: ICD-10-CM

## 2025-02-26 RX ORDER — FERROUS SULFATE 325(65) MG
1 TABLET ORAL
Qty: 30 TABLET | Refills: 0 | OUTPATIENT
Start: 2025-02-26

## 2025-02-26 RX ORDER — ATORVASTATIN CALCIUM 40 MG/1
40 TABLET, FILM COATED ORAL NIGHTLY
Qty: 30 TABLET | Refills: 0 | OUTPATIENT
Start: 2025-02-26

## 2025-03-20 DIAGNOSIS — I10 ESSENTIAL HYPERTENSION: ICD-10-CM

## 2025-03-20 RX ORDER — LISINOPRIL 10 MG/1
10 TABLET ORAL DAILY
Qty: 90 TABLET | Refills: 1 | OUTPATIENT
Start: 2025-03-20

## 2025-03-24 ENCOUNTER — OFFICE VISIT (OUTPATIENT)
Dept: FAMILY MEDICINE CLINIC | Age: 70
End: 2025-03-24
Payer: MEDICARE

## 2025-03-24 VITALS
HEIGHT: 62 IN | TEMPERATURE: 97.3 F | HEART RATE: 87 BPM | RESPIRATION RATE: 18 BRPM | DIASTOLIC BLOOD PRESSURE: 80 MMHG | WEIGHT: 209 LBS | BODY MASS INDEX: 38.46 KG/M2 | OXYGEN SATURATION: 97 % | SYSTOLIC BLOOD PRESSURE: 136 MMHG

## 2025-03-24 DIAGNOSIS — R53.82 CHRONIC FATIGUE: ICD-10-CM

## 2025-03-24 DIAGNOSIS — M79.672 BILATERAL FOOT PAIN: ICD-10-CM

## 2025-03-24 DIAGNOSIS — Z00.00 ENCOUNTER FOR MEDICAL EXAMINATION TO ESTABLISH CARE: Primary | ICD-10-CM

## 2025-03-24 DIAGNOSIS — M25.561 CHRONIC PAIN OF BOTH KNEES: ICD-10-CM

## 2025-03-24 DIAGNOSIS — I82.462 DEEP VEIN THROMBOSIS (DVT) OF CALF MUSCLE VEIN OF LEFT LOWER EXTREMITY, UNSPECIFIED CHRONICITY: ICD-10-CM

## 2025-03-24 DIAGNOSIS — B37.2 YEAST INFECTION OF THE SKIN: ICD-10-CM

## 2025-03-24 DIAGNOSIS — R20.2 NUMBNESS AND TINGLING OF BOTH FEET: ICD-10-CM

## 2025-03-24 DIAGNOSIS — G89.29 CHRONIC PAIN OF BOTH KNEES: ICD-10-CM

## 2025-03-24 DIAGNOSIS — Z87.891 PERSONAL HISTORY OF TOBACCO USE: ICD-10-CM

## 2025-03-24 DIAGNOSIS — D50.9 IRON DEFICIENCY ANEMIA, UNSPECIFIED IRON DEFICIENCY ANEMIA TYPE: ICD-10-CM

## 2025-03-24 DIAGNOSIS — R20.0 NUMBNESS AND TINGLING OF BOTH FEET: ICD-10-CM

## 2025-03-24 DIAGNOSIS — I10 ESSENTIAL HYPERTENSION: ICD-10-CM

## 2025-03-24 DIAGNOSIS — Z12.31 BREAST CANCER SCREENING BY MAMMOGRAM: ICD-10-CM

## 2025-03-24 DIAGNOSIS — R32 URINARY INCONTINENCE, UNSPECIFIED TYPE: ICD-10-CM

## 2025-03-24 DIAGNOSIS — E55.9 VITAMIN D DEFICIENCY: ICD-10-CM

## 2025-03-24 DIAGNOSIS — R73.03 PREDIABETES: ICD-10-CM

## 2025-03-24 DIAGNOSIS — E78.49 OTHER HYPERLIPIDEMIA: ICD-10-CM

## 2025-03-24 DIAGNOSIS — M25.562 CHRONIC PAIN OF BOTH KNEES: ICD-10-CM

## 2025-03-24 DIAGNOSIS — M79.671 BILATERAL FOOT PAIN: ICD-10-CM

## 2025-03-24 LAB
ALBUMIN: 4.6 G/DL (ref 3.5–5.2)
ALP BLD-CCNC: 94 U/L (ref 35–104)
ALT SERPL-CCNC: 25 U/L (ref 0–32)
ANION GAP SERPL CALCULATED.3IONS-SCNC: 20 MMOL/L (ref 7–16)
AST SERPL-CCNC: 25 U/L (ref 0–31)
BASOPHILS ABSOLUTE: 0.04 K/UL (ref 0–0.2)
BASOPHILS RELATIVE PERCENT: 1 % (ref 0–2)
BILIRUB SERPL-MCNC: 0.8 MG/DL (ref 0–1.2)
BUN BLDV-MCNC: 12 MG/DL (ref 6–23)
CALCIUM SERPL-MCNC: 9.8 MG/DL (ref 8.6–10.2)
CHLORIDE BLD-SCNC: 103 MMOL/L (ref 98–107)
CHOLESTEROL, TOTAL: 123 MG/DL
CO2: 20 MMOL/L (ref 22–29)
CREAT SERPL-MCNC: 0.9 MG/DL (ref 0.5–1)
EOSINOPHILS ABSOLUTE: 0.14 K/UL (ref 0.05–0.5)
EOSINOPHILS RELATIVE PERCENT: 2 % (ref 0–6)
FERRITIN: 338 NG/ML
GFR, ESTIMATED: 70 ML/MIN/1.73M2
GLUCOSE BLD-MCNC: 126 MG/DL (ref 74–99)
HBA1C MFR BLD: 4.9 % (ref 4–5.6)
HCT VFR BLD CALC: 36.1 % (ref 34–48)
HDLC SERPL-MCNC: 39 MG/DL
HEMOGLOBIN: 12.1 G/DL (ref 11.5–15.5)
IMMATURE GRANULOCYTES %: 0 % (ref 0–5)
IMMATURE GRANULOCYTES ABSOLUTE: 0.03 K/UL (ref 0–0.58)
IRON % SATURATION: 23 % (ref 15–50)
IRON: 56 UG/DL (ref 37–145)
LDL CHOLESTEROL: 54 MG/DL
LYMPHOCYTES ABSOLUTE: 0.85 K/UL (ref 1.5–4)
LYMPHOCYTES RELATIVE PERCENT: 13 % (ref 20–42)
MCH RBC QN AUTO: 32.3 PG (ref 26–35)
MCHC RBC AUTO-ENTMCNC: 33.5 G/DL (ref 32–34.5)
MCV RBC AUTO: 96.3 FL (ref 80–99.9)
MONOCYTES ABSOLUTE: 0.38 K/UL (ref 0.1–0.95)
MONOCYTES RELATIVE PERCENT: 6 % (ref 2–12)
NEUTROPHILS ABSOLUTE: 5.34 K/UL (ref 1.8–7.3)
NEUTROPHILS RELATIVE PERCENT: 79 % (ref 43–80)
PDW BLD-RTO: 16.8 % (ref 11.5–15)
PLATELET # BLD: 155 K/UL (ref 130–450)
PMV BLD AUTO: 8.9 FL (ref 7–12)
POTASSIUM SERPL-SCNC: 4 MMOL/L (ref 3.5–5)
RBC # BLD: 3.75 M/UL (ref 3.5–5.5)
SODIUM BLD-SCNC: 143 MMOL/L (ref 132–146)
TOTAL IRON BINDING CAPACITY: 246 UG/DL (ref 250–450)
TOTAL PROTEIN: 7.6 G/DL (ref 6.4–8.3)
TRIGL SERPL-MCNC: 150 MG/DL
TSH SERPL DL<=0.05 MIU/L-ACNC: 2.06 UIU/ML (ref 0.27–4.2)
VLDLC SERPL CALC-MCNC: 30 MG/DL
WBC # BLD: 6.8 K/UL (ref 4.5–11.5)

## 2025-03-24 PROCEDURE — 99214 OFFICE O/P EST MOD 30 MIN: CPT | Performed by: STUDENT IN AN ORGANIZED HEALTH CARE EDUCATION/TRAINING PROGRAM

## 2025-03-24 PROCEDURE — 1123F ACP DISCUSS/DSCN MKR DOCD: CPT | Performed by: STUDENT IN AN ORGANIZED HEALTH CARE EDUCATION/TRAINING PROGRAM

## 2025-03-24 PROCEDURE — G0296 VISIT TO DETERM LDCT ELIG: HCPCS | Performed by: STUDENT IN AN ORGANIZED HEALTH CARE EDUCATION/TRAINING PROGRAM

## 2025-03-24 PROCEDURE — 3079F DIAST BP 80-89 MM HG: CPT | Performed by: STUDENT IN AN ORGANIZED HEALTH CARE EDUCATION/TRAINING PROGRAM

## 2025-03-24 PROCEDURE — 1160F RVW MEDS BY RX/DR IN RCRD: CPT | Performed by: STUDENT IN AN ORGANIZED HEALTH CARE EDUCATION/TRAINING PROGRAM

## 2025-03-24 PROCEDURE — 1159F MED LIST DOCD IN RCRD: CPT | Performed by: STUDENT IN AN ORGANIZED HEALTH CARE EDUCATION/TRAINING PROGRAM

## 2025-03-24 PROCEDURE — 3075F SYST BP GE 130 - 139MM HG: CPT | Performed by: STUDENT IN AN ORGANIZED HEALTH CARE EDUCATION/TRAINING PROGRAM

## 2025-03-24 RX ORDER — LISINOPRIL 10 MG/1
10 TABLET ORAL DAILY
Qty: 90 TABLET | Refills: 1 | Status: SHIPPED | OUTPATIENT
Start: 2025-03-24

## 2025-03-24 RX ORDER — ATORVASTATIN CALCIUM 40 MG/1
40 TABLET, FILM COATED ORAL NIGHTLY
Qty: 90 TABLET | Refills: 0 | Status: SHIPPED | OUTPATIENT
Start: 2025-03-24

## 2025-03-24 RX ORDER — NYSTATIN 100000 U/G
CREAM TOPICAL
Qty: 30 G | Refills: 5 | Status: SHIPPED | OUTPATIENT
Start: 2025-03-24

## 2025-03-24 RX ORDER — TOLTERODINE 2 MG/1
2 CAPSULE, EXTENDED RELEASE ORAL DAILY
Qty: 90 CAPSULE | Refills: 1 | Status: SHIPPED | OUTPATIENT
Start: 2025-03-24

## 2025-03-24 SDOH — ECONOMIC STABILITY: FOOD INSECURITY: WITHIN THE PAST 12 MONTHS, THE FOOD YOU BOUGHT JUST DIDN'T LAST AND YOU DIDN'T HAVE MONEY TO GET MORE.: NEVER TRUE

## 2025-03-24 SDOH — ECONOMIC STABILITY: FOOD INSECURITY: WITHIN THE PAST 12 MONTHS, YOU WORRIED THAT YOUR FOOD WOULD RUN OUT BEFORE YOU GOT MONEY TO BUY MORE.: NEVER TRUE

## 2025-03-24 ASSESSMENT — ENCOUNTER SYMPTOMS
ABDOMINAL PAIN: 0
CONSTIPATION: 0
SHORTNESS OF BREATH: 0
SINUS PAIN: 0
EYE PAIN: 0
COUGH: 0
SINUS PRESSURE: 0
NAUSEA: 0
EYE REDNESS: 0
VOMITING: 0
BACK PAIN: 0
RHINORRHEA: 0
SORE THROAT: 0
DIARRHEA: 0

## 2025-03-24 ASSESSMENT — PATIENT HEALTH QUESTIONNAIRE - PHQ9
SUM OF ALL RESPONSES TO PHQ QUESTIONS 1-9: 0
2. FEELING DOWN, DEPRESSED OR HOPELESS: NOT AT ALL
SUM OF ALL RESPONSES TO PHQ QUESTIONS 1-9: 0
1. LITTLE INTEREST OR PLEASURE IN DOING THINGS: NOT AT ALL

## 2025-03-24 NOTE — PROGRESS NOTES
3/24/2025    HPI  Chief Complaint   Patient presents with    New Patient    Knee Pain     Bilateral      Foot Pain     Bilateral         Anu Hackett is a 70 y.o. female who  has a past medical history of Anemia, Cerebral artery occlusion with cerebral infarction (HCC), Chronic back pain, Hyperlipidemia, Hypertension, Malignant neoplasm of ascending colon (HCC), Obesity, Osteoarthritis, Screen for colon cancer, and Tobacco abuse.     History of Present Illness  The patient presents for evaluation of colon cancer, knee pain, tingling in the feet, blood clot, and increased urinary frequency. She is accompanied by her son, who attends medical appointments with her.    Patient is here today to establish care myself.  Patient has no known allergies.  Patient is not a very good historian and does not remember physicians that she has seen or who has been taking care of her over the last year or so.  She previously saw Dr. Robles but this has not been since December 2023.    Colon cancer was diagnosed in 2021, and she is under the care of an oncologist. However, she does not know who it is. A port is in place for blood work, though the date of the last blood work is not recalled. Chemotherapy was completed quite a while ago.    Constant knee pain is reported, which does not interfere with mobility. No falls have occurred due to the pain. Additionally, she describes a sensation of thickness in her feet, accompanied by tingling, present for approximately one year. This discomfort is more pronounced when seated. No medical attention has been sought for this issue, nor has any imaging been performed. Occasional hand tingling is also reported but is considered normal, with no associated loss of  strength.    In 2021, she experienced a blood clot concurrent with a myocardial infarction. She is under the care of a cardiologist, though the name and location are unknown. A refill of her Eliquis prescription is

## 2025-03-25 ENCOUNTER — RESULTS FOLLOW-UP (OUTPATIENT)
Dept: FAMILY MEDICINE CLINIC | Age: 70
End: 2025-03-25

## 2025-03-25 LAB
VITAMIN B-12: 481 PG/ML (ref 211–946)
VITAMIN D 25-HYDROXY: 37.5 NG/ML (ref 30–100)

## 2025-06-15 DIAGNOSIS — I82.462 DEEP VEIN THROMBOSIS (DVT) OF CALF MUSCLE VEIN OF LEFT LOWER EXTREMITY, UNSPECIFIED CHRONICITY (HCC): ICD-10-CM

## 2025-06-16 RX ORDER — APIXABAN 5 MG/1
5 TABLET, FILM COATED ORAL 2 TIMES DAILY
Qty: 60 TABLET | Refills: 2 | Status: SHIPPED | OUTPATIENT
Start: 2025-06-16

## 2025-06-16 NOTE — TELEPHONE ENCOUNTER
Name of Medication(s) Requested:  Requested Prescriptions     Pending Prescriptions Disp Refills    ELIQUIS 5 MG TABS tablet [Pharmacy Med Name: ELIQUIS 5 MG TABLET] 60 tablet 2     Sig: TAKE 1 TABLET BY MOUTH TWICE A DAY       Medication is on current medication list Yes    Dosage and directions were verified? Yes    Quantity verified: 30 day supply     Pharmacy Verified?  Yes    Last Appointment:  3/24/2025    Future appts:  Future Appointments   Date Time Provider Department Center   6/17/2025  2:00 PM Daysi Molina DO [facility]        (If no appt send self scheduling link. .REFILLAPPT)  Scheduling request sent?     [] Yes  [x] No    Does patient need updated?  [] Yes  [x] No

## 2025-06-17 ENCOUNTER — OFFICE VISIT (OUTPATIENT)
Dept: FAMILY MEDICINE CLINIC | Age: 70
End: 2025-06-17
Payer: MEDICARE

## 2025-06-17 VITALS
HEART RATE: 77 BPM | WEIGHT: 202 LBS | OXYGEN SATURATION: 97 % | BODY MASS INDEX: 37.17 KG/M2 | HEIGHT: 62 IN | RESPIRATION RATE: 18 BRPM | TEMPERATURE: 97.4 F | SYSTOLIC BLOOD PRESSURE: 120 MMHG | DIASTOLIC BLOOD PRESSURE: 70 MMHG

## 2025-06-17 DIAGNOSIS — D50.9 IRON DEFICIENCY ANEMIA, UNSPECIFIED IRON DEFICIENCY ANEMIA TYPE: ICD-10-CM

## 2025-06-17 DIAGNOSIS — Z00.00 MEDICARE ANNUAL WELLNESS VISIT, SUBSEQUENT: Primary | ICD-10-CM

## 2025-06-17 DIAGNOSIS — B37.2 YEAST INFECTION OF THE SKIN: ICD-10-CM

## 2025-06-17 DIAGNOSIS — Z12.31 BREAST CANCER SCREENING BY MAMMOGRAM: ICD-10-CM

## 2025-06-17 DIAGNOSIS — L98.9 SKIN LESION: ICD-10-CM

## 2025-06-17 DIAGNOSIS — B07.8 OTHER VIRAL WARTS: ICD-10-CM

## 2025-06-17 DIAGNOSIS — Z87.891 PERSONAL HISTORY OF TOBACCO USE: ICD-10-CM

## 2025-06-17 DIAGNOSIS — Z78.0 POSTMENOPAUSAL: ICD-10-CM

## 2025-06-17 DIAGNOSIS — E78.49 OTHER HYPERLIPIDEMIA: ICD-10-CM

## 2025-06-17 DIAGNOSIS — Z71.89 COUNSELING FOR LIVING WILL: ICD-10-CM

## 2025-06-17 PROCEDURE — G0296 VISIT TO DETERM LDCT ELIG: HCPCS | Performed by: STUDENT IN AN ORGANIZED HEALTH CARE EDUCATION/TRAINING PROGRAM

## 2025-06-17 PROCEDURE — 1160F RVW MEDS BY RX/DR IN RCRD: CPT | Performed by: STUDENT IN AN ORGANIZED HEALTH CARE EDUCATION/TRAINING PROGRAM

## 2025-06-17 PROCEDURE — 1159F MED LIST DOCD IN RCRD: CPT | Performed by: STUDENT IN AN ORGANIZED HEALTH CARE EDUCATION/TRAINING PROGRAM

## 2025-06-17 PROCEDURE — G0439 PPPS, SUBSEQ VISIT: HCPCS | Performed by: STUDENT IN AN ORGANIZED HEALTH CARE EDUCATION/TRAINING PROGRAM

## 2025-06-17 PROCEDURE — 1123F ACP DISCUSS/DSCN MKR DOCD: CPT | Performed by: STUDENT IN AN ORGANIZED HEALTH CARE EDUCATION/TRAINING PROGRAM

## 2025-06-17 RX ORDER — NYSTATIN 100000 U/G
CREAM TOPICAL
Qty: 30 G | Refills: 5 | Status: SHIPPED | OUTPATIENT
Start: 2025-06-17

## 2025-06-17 RX ORDER — FERROUS SULFATE 325(65) MG
1 TABLET ORAL EVERY OTHER DAY
Qty: 45 TABLET | Refills: 1 | Status: SHIPPED | OUTPATIENT
Start: 2025-06-17

## 2025-06-17 RX ORDER — DOXYCYCLINE HYCLATE 100 MG
100 TABLET ORAL 2 TIMES DAILY
Qty: 28 TABLET | Refills: 0 | Status: SHIPPED | OUTPATIENT
Start: 2025-06-17 | End: 2025-07-01

## 2025-06-17 RX ORDER — ATORVASTATIN CALCIUM 40 MG/1
40 TABLET, FILM COATED ORAL NIGHTLY
Qty: 90 TABLET | Refills: 1 | Status: SHIPPED | OUTPATIENT
Start: 2025-06-17

## 2025-06-17 SDOH — HEALTH STABILITY: PHYSICAL HEALTH: ON AVERAGE, HOW MANY DAYS PER WEEK DO YOU ENGAGE IN MODERATE TO STRENUOUS EXERCISE (LIKE A BRISK WALK)?: 7 DAYS

## 2025-06-17 SDOH — HEALTH STABILITY: PHYSICAL HEALTH: ON AVERAGE, HOW MANY MINUTES DO YOU ENGAGE IN EXERCISE AT THIS LEVEL?: 30 MIN

## 2025-06-17 ASSESSMENT — PATIENT HEALTH QUESTIONNAIRE - PHQ9
SUM OF ALL RESPONSES TO PHQ QUESTIONS 1-9: 0
1. LITTLE INTEREST OR PLEASURE IN DOING THINGS: NOT AT ALL
SUM OF ALL RESPONSES TO PHQ QUESTIONS 1-9: 0
2. FEELING DOWN, DEPRESSED OR HOPELESS: NOT AT ALL

## 2025-06-17 ASSESSMENT — LIFESTYLE VARIABLES
HOW OFTEN DO YOU HAVE SIX OR MORE DRINKS ON ONE OCCASION: 1
HOW MANY STANDARD DRINKS CONTAINING ALCOHOL DO YOU HAVE ON A TYPICAL DAY: 0
HOW OFTEN DO YOU HAVE A DRINK CONTAINING ALCOHOL: NEVER
HOW OFTEN DO YOU HAVE A DRINK CONTAINING ALCOHOL: 1
HOW MANY STANDARD DRINKS CONTAINING ALCOHOL DO YOU HAVE ON A TYPICAL DAY: PATIENT DOES NOT DRINK

## 2025-06-17 NOTE — PATIENT INSTRUCTIONS
vision is often tested as part of a routine exam. You may also have this test when you apply for a job where recognizing different colors is important, such as , electronics, or the .  How are vision tests done?  Visual acuity test   You cover one eye at a time.  You read aloud from a wall chart across the room.  You read aloud from a small card that you hold in your hand.  Refraction   You look into a special device.  The device puts lenses of different strengths in front of each eye to see how strong your glasses or contact lenses need to be.  Visual field tests   Your doctor may have you look through special machines.  Or your doctor may simply have you stare straight ahead while they move a finger into and out of your field of vision.  Color vision test   You look at pieces of printed test patterns in various colors. You say what number or symbol you see.  Your doctor may have you trace the number or symbol using a pointer.  How do these tests feel?  There is very little chance of having a problem from this test. If dilating drops are used for a vision test, they may make the eyes sting and cause a medicine taste in the mouth.  Follow-up care is a key part of your treatment and safety. Be sure to make and go to all appointments, and call your doctor if you are having problems. It's also a good idea to know your test results and keep a list of the medicines you take.  Where can you learn more?  Go to https://[email or web address].net/patientEd and enter G551 to learn more about \"Learning About Vision Tests.\"  Current as of: July 31, 2024  Content Version: 14.5  © 2024-2025 [employer].   Care instructions adapted under license by [employer]. If you have questions about a medical condition or this instruction, always ask your healthcare professional. [employer] disclaims any warranty or liability for your use of this information.         Preventing Falls: Care

## 2025-06-17 NOTE — PROGRESS NOTES
ACP Coordinator                Lung Cancer Screening:  LDCT screening ordered again today              Objective   Vitals:    06/17/25 1350   BP: 120/70   Pulse: 77   Resp: 18   Temp: 97.4 °F (36.3 °C)   TempSrc: Temporal   SpO2: 97%   Weight: 91.6 kg (202 lb)   Height: 1.575 m (5' 2\")      Body mass index is 36.95 kg/m².        Physical Exam  Vitals and nursing note reviewed.   Constitutional:       General: She is not in acute distress.     Appearance: Normal appearance. She is not ill-appearing.   HENT:      Head: Normocephalic and atraumatic.      Right Ear: External ear normal.      Left Ear: External ear normal.      Ears:      Comments: Wart in right ear canal  Eyes:      Extraocular Movements: Extraocular movements intact.      Conjunctiva/sclera: Conjunctivae normal.      Pupils: Pupils are equal, round, and reactive to light.   Cardiovascular:      Rate and Rhythm: Normal rate and regular rhythm.      Pulses: Normal pulses.      Heart sounds: Normal heart sounds.   Pulmonary:      Effort: Pulmonary effort is normal.      Breath sounds: Normal breath sounds.   Abdominal:      General: Bowel sounds are normal.      Palpations: Abdomen is soft.   Musculoskeletal:         General: Normal range of motion.      Cervical back: Normal range of motion and neck supple.   Skin:     General: Skin is warm and dry.      Capillary Refill: Capillary refill takes less than 2 seconds.      Findings: Lesion present.   Neurological:      Mental Status: She is alert and oriented to person, place, and time.      Gait: Gait abnormal.   Psychiatric:         Mood and Affect: Mood is anxious. Affect is tearful.         Behavior: Behavior normal.         Thought Content: Thought content normal.                No Known Allergies  Prior to Visit Medications    Medication Sig Taking? Authorizing Provider   ferrous sulfate (IRON 325) 325 (65 Fe) MG tablet Take 1 tablet by mouth every other day Yes Daysi Molina, DO   atorvastatin

## 2025-06-18 ENCOUNTER — CLINICAL DOCUMENTATION (OUTPATIENT)
Age: 70
End: 2025-06-18

## 2025-06-18 NOTE — ACP (ADVANCE CARE PLANNING)
Advance Care Planning   Ambulatory ACP Specialist Patient Outreach    Date:  6/18/2025    ACP Specialist:  Larissa Diane LPN    Outreach call to patient in follow-up to ACP Specialist referral from:Daysi Molina DO    [x] PCP  [] Provider   [] Ambulatory Care Management [] Other     For:                  [x] Advance Directive Assistance              [] Complete Portable DNR order              [] Complete POST/POLST/MOST              [] Code Status Discussion             [] Discuss Goals of Care             [] Early ACP Decision-Making              [] Other (Specify)    Date Referral Received:6/17/25    Next Step:   [] ACP scheduled conversation  [] Outreach again in one week               [x] Email / Mail ACP Info Sheets  [x] Email / Mail Advance Directive   [] Closing referral.  Routing closure to referring provider/staff and to ACP Specialist .    [] Closure letter mailed to patient with invitation to contact ACP Specialist if / when ready.   [] Other (Specify here):       [x] At this time, Healthcare Decision Maker Is:     Primary Decision Maker: Luke Hackett - Child - [phone]    Primary Decision Maker: Abelardo Hackett - Child - [phone]          [] Primary agent named in scanned advance directive.    [x] Legal Next of Kin.     [] Unable to determine legal decision maker at this time.    Outreaches:       [x] 1st -  Date:  6/18/25               Intervention:  [x] Spoke with Patient's son  [] Left Voice mail [x] Email / Mail    [] MyChart  [] Other (Specify) :     Outcomes:Writer contacted patient on home/mobile phone [phone] to discuss ACP.  Spoke to patient's son, Luke and he is agreeable to a conversation with ACP Specialist Nieves Nicholson on  Monday, July 7, 2025 at 3:00 p.m. HCDM conversation declined until appointment.  A copy of [place] [employer] forms and ACP information sheets sent to patient's confirmed email address on file with ACP Specialist and Coordinator's contact information 
Vaccine Information Sheet (VIS) provided-VIS date: 8/07/15/Risks/benefits discussed with patient/surrogate

## 2025-07-07 ENCOUNTER — CLINICAL DOCUMENTATION (OUTPATIENT)
Age: 70
End: 2025-07-07

## 2025-07-07 NOTE — ACP (ADVANCE CARE PLANNING)
Advance Care Planning   Ambulatory ACP Specialist Patient Outreach    Date:  7/7/2025    ACP Specialist:  AKSHAT Frias    Outreach call to patient in follow-up to ACP Specialist referral from:Daysi Molina DO    [x] PCP  [] Provider   [] Ambulatory Care Management [] Other     For:                  [x] Advance Directive Assistance              [] Complete Portable DNR order              [] Complete POST/POLST/MOST              [] Code Status Discussion             [] Discuss Goals of Care             [] Early ACP Decision-Making              [] Other (Specify)    Date Referral Received:06/17/2025    Next Step:   [] ACP scheduled conversation  [x] Outreach again in one week               [] Email / Mail ACP Info Sheets  [] Email / Mail Advance Directive   [] Closing referral.  Routing closure to referring provider/staff and to ACP Specialist .    [] Closure letter mailed to patient with invitation to contact ACP Specialist if / when ready.   [] Other (Specify here):       [x] At this time, Healthcare Decision Maker Is:         [] Primary agent named in scanned advance directive.    [x] Legal Next of Kin.     [] Unable to determine legal decision maker at this time.    Outreaches:          [x]  Additional Outreach -  Date:  07/07/2025   (Specify Dates & special circumstances):    Outcomes: Pt was NO SHOW for today's ACP visit. Placed call to home/mobile [phone] and there was no answer. Left VM with SW contact and encouraged return call. No alt#. Will make another outreach attempt in one week.     Thank you for this referral.

## (undated) DEVICE — DRAPE C ARM W41XL74IN UNIV MOB W RUBBERBAND CLP

## (undated) DEVICE — SOLUTION IV 100ML 0.9% SOD CHL PLAS CONT USP VIAFLX 1 PER

## (undated) DEVICE — SET INST GENERAL LONG EXTRAS

## (undated) DEVICE — PAD GEN USE BORDERED ADH 14IN 2IN AND 12IN 4IN GZ UNIV ST

## (undated) DEVICE — PACK PROCEDURE SURG GEN CUST

## (undated) DEVICE — TOWEL,OR,DSP,ST,GREEN,DLX,XR,4/PK,20PK/C: Brand: MEDLINE

## (undated) DEVICE — COVER HNDL LT DISP

## (undated) DEVICE — ADHESIVE SKIN CLSR 0.7ML TOP DERMBND ADV

## (undated) DEVICE — GOWN,SIRUS,FABRNF,XL,20/CS: Brand: MEDLINE

## (undated) DEVICE — GLOVE ORANGE PI 7   MSG9070

## (undated) DEVICE — Device

## (undated) DEVICE — 4-PORT MANIFOLD: Brand: NEPTUNE 2

## (undated) DEVICE — TOWEL,OR,DSP,ST,BLUE,STD,6/PK,12PK/CS: Brand: MEDLINE

## (undated) DEVICE — RETRACTOR BOOKWALTER POSTGENERAL

## (undated) DEVICE — SYRINGE, LUER LOCK, 10ML: Brand: MEDLINE

## (undated) DEVICE — RETRACTOR BOOKWALTER RING GENERAL

## (undated) DEVICE — BLADE CLIPPER GEN PURP NS

## (undated) DEVICE — GLOVE SURG SZ 7 L12IN FNGR THK94MIL TRNSLUC YEL LTX HYDRGEL

## (undated) DEVICE — INTENDED FOR TISSUE SEPARATION, AND OTHER PROCEDURES THAT REQUIRE A SHARP SURGICAL BLADE TO PUNCTURE OR CUT.: Brand: BARD-PARKER ® STAINLESS STEEL BLADES

## (undated) DEVICE — BASIC SINGLE BASIN 1-LF: Brand: MEDLINE INDUSTRIES, INC.

## (undated) DEVICE — TUBING, SUCTION, 3/16" X 12', STRAIGHT: Brand: MEDLINE

## (undated) DEVICE — KIT SURG W7XL11IN 2 PKT UNTREATED NA

## (undated) DEVICE — SOLUTION SURG PREP POV IOD 7.5% 4 OZ

## (undated) DEVICE — SET INSTRUMENT LAP I

## (undated) DEVICE — PICO 7 DUAL 15X20CM: Brand: PICO™ 7

## (undated) DEVICE — RETRACTOR BOOKWALTER BLADE

## (undated) DEVICE — STAPLER INT L60MM REG TISS BLU B FRM 8 FIRING 2 ROW AUTO

## (undated) DEVICE — SET INSTRUMENT LAP II

## (undated) DEVICE — STAPLER INT L75MM CUT LN L73MM STPL LN L77MM BLU B FRM 8

## (undated) DEVICE — DRAPE,CHEST,FENES,15X10,STERIL: Brand: MEDLINE

## (undated) DEVICE — GLOVE ORANGE PI 7 1/2   MSG9075

## (undated) DEVICE — NEEDLE HYPO 25GA L1.5IN BLU POLYPR HUB S STL REG BVL STR

## (undated) DEVICE — MARKER,SKIN,WI/RULER AND LABELS: Brand: MEDLINE

## (undated) DEVICE — DOUBLE BASIN SET: Brand: MEDLINE INDUSTRIES, INC.

## (undated) DEVICE — DECANTER: Brand: UNBRANDED

## (undated) DEVICE — SEALER ENDOSCP NANO COAT OPN DIV CRV L JAW LIGASURE IMPACT

## (undated) DEVICE — ELECTRODE PT RET AD L9FT HI MOIST COND ADH HYDRGEL CORDED

## (undated) DEVICE — DRAPE,REIN 53X77,STERILE: Brand: MEDLINE

## (undated) DEVICE — GAUZE,SPONGE,4"X4",8PLY,STRL,LF,10/TRAY: Brand: MEDLINE

## (undated) DEVICE — APPLICATOR PREP 26ML 0.7% IOD POVACRYLEX 74% ISO ALC ST

## (undated) DEVICE — SYRINGE IRRIG 60ML SFT PLIABLE BLB EZ TO GRP 1 HND USE W/

## (undated) DEVICE — DRAPE,LITHOTOMY,AB,STERILE: Brand: MEDLINE

## (undated) DEVICE — TOTAL TRAY, 16FR 10ML SIL FOLEY, URN: Brand: MEDLINE

## (undated) DEVICE — YANKAUER,OPEN TIP,W/O VENT,STERILE: Brand: MEDLINE INDUSTRIES, INC.

## (undated) DEVICE — GOWN,BREATHABLE,IMP SLV 3XL AURORA: Brand: MEDLINE

## (undated) DEVICE — SOLUTION IV IRRIG POUR BRL 0.9% SODIUM CHL 2F7124

## (undated) DEVICE — SOLUTION IV IRRIG WATER 1000ML POUR BRL 2F7114

## (undated) DEVICE — YANKAUER,POOLE TIP,STERILE,50/CS: Brand: MEDLINE

## (undated) DEVICE — SPONGE LAP W18XL18IN WHT COT 4 PLY FLD STRUNG RADPQ DISP ST

## (undated) DEVICE — TRAY AMBULATORY REUSABLE

## (undated) DEVICE — GLOVE SURG SZ 75 L12IN FNGR THK94MIL TRNSLUC YEL LTX

## (undated) DEVICE — BLADE ES L6IN ELASTOMERIC COAT EXT DURABLE BEND UPTO 90DEG

## (undated) DEVICE — RELOAD STPL L75MM OPN H3.8MM CLS 1.5MM WIRE DIA0.2MM REG

## (undated) DEVICE — CHLORAPREP 26ML ORANGE

## (undated) DEVICE — DILATOR INTESTINAL